# Patient Record
Sex: MALE | Race: WHITE | NOT HISPANIC OR LATINO | URBAN - METROPOLITAN AREA
[De-identification: names, ages, dates, MRNs, and addresses within clinical notes are randomized per-mention and may not be internally consistent; named-entity substitution may affect disease eponyms.]

---

## 2017-05-31 ENCOUNTER — OUTPATIENT (OUTPATIENT)
Dept: OUTPATIENT SERVICES | Facility: HOSPITAL | Age: 57
LOS: 1 days | Discharge: HOME | End: 2017-05-31
Payer: MEDICARE

## 2017-05-31 ENCOUNTER — TRANSCRIPTION ENCOUNTER (OUTPATIENT)
Age: 57
End: 2017-05-31

## 2017-05-31 PROCEDURE — 66984 XCAPSL CTRC RMVL W/O ECP: CPT | Mod: RT

## 2017-05-31 PROCEDURE — C1780: CPT

## 2020-07-01 PROBLEM — E11.9 TYPE 2 DIABETES MELLITUS WITHOUT COMPLICATIONS: Chronic | Status: ACTIVE | Noted: 2020-03-10

## 2020-07-07 ENCOUNTER — TRANSCRIPTION ENCOUNTER (OUTPATIENT)
Age: 60
End: 2020-07-07

## 2020-07-07 VITALS
OXYGEN SATURATION: 99 % | DIASTOLIC BLOOD PRESSURE: 83 MMHG | TEMPERATURE: 98 F | WEIGHT: 235.01 LBS | HEART RATE: 72 BPM | HEIGHT: 72 IN | RESPIRATION RATE: 16 BRPM | SYSTOLIC BLOOD PRESSURE: 159 MMHG

## 2020-07-07 RX ORDER — ACETAMINOPHEN 500 MG
650 TABLET ORAL EVERY 6 HOURS
Refills: 0 | Status: DISCONTINUED | OUTPATIENT
Start: 2020-07-08 | End: 2020-07-23

## 2020-07-07 NOTE — ASU PATIENT PROFILE, ADULT - MEDICATION DISPOSITION, PROFILE
pt has eyedrops in bedside drawer because  he has to bring them to Dr Puentes's office post op/bedside

## 2020-07-07 NOTE — ASU PATIENT PROFILE, ADULT - PMH
Carotid artery occlusion  20% right and left  Cataract of left eye    Diabetes type 2, controlled    Low vitamin D level    Osteomyelitis  chronic left stump

## 2020-07-08 ENCOUNTER — OUTPATIENT (OUTPATIENT)
Dept: OUTPATIENT SERVICES | Facility: HOSPITAL | Age: 60
LOS: 1 days | End: 2020-07-08
Payer: MEDICARE

## 2020-07-08 VITALS
TEMPERATURE: 98 F | DIASTOLIC BLOOD PRESSURE: 62 MMHG | HEART RATE: 51 BPM | OXYGEN SATURATION: 98 % | RESPIRATION RATE: 16 BRPM | SYSTOLIC BLOOD PRESSURE: 145 MMHG

## 2020-07-08 DIAGNOSIS — Z89.512 ACQUIRED ABSENCE OF LEFT LEG BELOW KNEE: Chronic | ICD-10-CM

## 2020-07-08 DIAGNOSIS — Z98.41 CATARACT EXTRACTION STATUS, RIGHT EYE: Chronic | ICD-10-CM

## 2020-07-08 DIAGNOSIS — H25.12 AGE-RELATED NUCLEAR CATARACT, LEFT EYE: ICD-10-CM

## 2020-07-08 LAB — GLUCOSE BLDC GLUCOMTR-MCNC: 264 MG/DL — HIGH (ref 70–99)

## 2020-07-08 PROCEDURE — V2632: CPT

## 2020-07-08 PROCEDURE — 82962 GLUCOSE BLOOD TEST: CPT

## 2020-07-08 PROCEDURE — 66984 XCAPSL CTRC RMVL W/O ECP: CPT | Mod: LT

## 2020-07-08 RX ORDER — SODIUM CHLORIDE 9 MG/ML
1000 INJECTION, SOLUTION INTRAVENOUS
Refills: 0 | Status: DISCONTINUED | OUTPATIENT
Start: 2020-07-08 | End: 2020-07-08

## 2020-07-08 RX ORDER — KETOROLAC TROMETHAMINE 0.5 %
1 DROPS OPHTHALMIC (EYE)
Refills: 0 | Status: COMPLETED | OUTPATIENT
Start: 2020-07-08 | End: 2020-07-08

## 2020-07-08 RX ORDER — CYCLOPENTOLATE HYDROCHLORIDE 10 MG/ML
1 SOLUTION/ DROPS OPHTHALMIC
Refills: 0 | Status: COMPLETED | OUTPATIENT
Start: 2020-07-08 | End: 2020-07-08

## 2020-07-08 RX ORDER — TROPICAMIDE 1 %
1 DROPS OPHTHALMIC (EYE)
Refills: 0 | Status: COMPLETED | OUTPATIENT
Start: 2020-07-08 | End: 2020-07-08

## 2020-07-08 RX ORDER — PHENYLEPHRINE HCL 2.5 %
1 DROPS OPHTHALMIC (EYE)
Refills: 0 | Status: COMPLETED | OUTPATIENT
Start: 2020-07-08 | End: 2020-07-08

## 2020-07-08 RX ADMIN — CYCLOPENTOLATE HYDROCHLORIDE 1 DROP(S): 10 SOLUTION/ DROPS OPHTHALMIC at 07:48

## 2020-07-08 RX ADMIN — Medication 1 DROP(S): at 07:59

## 2020-07-08 RX ADMIN — Medication 1 DROP(S): at 07:53

## 2020-07-08 RX ADMIN — CYCLOPENTOLATE HYDROCHLORIDE 1 DROP(S): 10 SOLUTION/ DROPS OPHTHALMIC at 07:53

## 2020-07-08 RX ADMIN — CYCLOPENTOLATE HYDROCHLORIDE 1 DROP(S): 10 SOLUTION/ DROPS OPHTHALMIC at 07:59

## 2020-07-08 RX ADMIN — Medication 1 DROP(S): at 07:48

## 2020-07-08 RX ADMIN — Medication 1 DROP(S): at 07:52

## 2020-07-08 RX ADMIN — SODIUM CHLORIDE 40 MILLILITER(S): 9 INJECTION, SOLUTION INTRAVENOUS at 08:02

## 2020-07-08 RX ADMIN — Medication 1 DROP(S): at 07:58

## 2020-07-08 NOTE — BRIEF OPERATIVE NOTE - NSICDXBRIEFPROCEDURE_GEN_ALL_CORE_FT
PROCEDURES:  Single stage extracapsular removal of cataract with insertion of intraocular lens prosthesis by phacoemulsification 08-Jul-2020 09:38:02  Edison Puentes

## 2020-07-08 NOTE — ASU DISCHARGE PLAN (ADULT/PEDIATRIC) - NURSING INSTRUCTIONS
All discharge instruction reviewed with patient including pain, safety, medication and follow up care. Pt acknowleged understanding of discharge instructions

## 2020-07-08 NOTE — ASU DISCHARGE PLAN (ADULT/PEDIATRIC) - ASU DC SPECIAL INSTRUCTIONSFT
Keep shield on eye until office visit today at 2 pm  Any problems call office at [phone]    OFFICE VISIT TODAY AT 2:30 pm  Bring Drops

## 2022-03-25 NOTE — ASU PATIENT PROFILE, ADULT - AS SC BRADEN MOISTURE
1st Attempt: Called and left message for patient to call clinic back for results   (3) occasionally moist

## 2023-03-02 ENCOUNTER — EMERGENCY (EMERGENCY)
Facility: HOSPITAL | Age: 63
LOS: 1 days | Discharge: HOME | End: 2023-03-02
Attending: EMERGENCY MEDICINE | Admitting: EMERGENCY MEDICINE
Payer: MEDICARE

## 2023-03-02 VITALS
OXYGEN SATURATION: 97 % | DIASTOLIC BLOOD PRESSURE: 76 MMHG | SYSTOLIC BLOOD PRESSURE: 158 MMHG | TEMPERATURE: 100 F | RESPIRATION RATE: 17 BRPM | HEART RATE: 81 BPM

## 2023-03-02 VITALS
OXYGEN SATURATION: 95 % | RESPIRATION RATE: 18 BRPM | HEART RATE: 117 BPM | WEIGHT: 235.01 LBS | TEMPERATURE: 99 F | SYSTOLIC BLOOD PRESSURE: 170 MMHG | DIASTOLIC BLOOD PRESSURE: 91 MMHG

## 2023-03-02 DIAGNOSIS — Z98.41 CATARACT EXTRACTION STATUS, RIGHT EYE: Chronic | ICD-10-CM

## 2023-03-02 DIAGNOSIS — Z89.512 ACQUIRED ABSENCE OF LEFT LEG BELOW KNEE: Chronic | ICD-10-CM

## 2023-03-02 PROBLEM — R79.89 OTHER SPECIFIED ABNORMAL FINDINGS OF BLOOD CHEMISTRY: Chronic | Status: ACTIVE | Noted: 2020-07-07

## 2023-03-02 PROBLEM — I65.29 OCCLUSION AND STENOSIS OF UNSPECIFIED CAROTID ARTERY: Chronic | Status: ACTIVE | Noted: 2020-07-07

## 2023-03-02 PROBLEM — H26.9 UNSPECIFIED CATARACT: Chronic | Status: ACTIVE | Noted: 2020-07-07

## 2023-03-02 PROBLEM — M86.9 OSTEOMYELITIS, UNSPECIFIED: Chronic | Status: ACTIVE | Noted: 2020-07-07

## 2023-03-02 LAB
ALBUMIN SERPL ELPH-MCNC: 3.2 G/DL — LOW (ref 3.3–5)
ALP SERPL-CCNC: 107 U/L — SIGNIFICANT CHANGE UP (ref 40–120)
ALT FLD-CCNC: 14 U/L — SIGNIFICANT CHANGE UP (ref 10–45)
ANION GAP SERPL CALC-SCNC: 11 MMOL/L — SIGNIFICANT CHANGE UP (ref 5–17)
APPEARANCE UR: CLEAR — SIGNIFICANT CHANGE UP
APTT BLD: 27.4 SEC — LOW (ref 27.5–35.5)
AST SERPL-CCNC: 19 U/L — SIGNIFICANT CHANGE UP (ref 10–40)
BACTERIA # UR AUTO: ABNORMAL /HPF
BASOPHILS # BLD AUTO: 0.03 K/UL — SIGNIFICANT CHANGE UP (ref 0–0.2)
BASOPHILS NFR BLD AUTO: 0.2 % — SIGNIFICANT CHANGE UP (ref 0–2)
BILIRUB SERPL-MCNC: 1 MG/DL — SIGNIFICANT CHANGE UP (ref 0.2–1.2)
BILIRUB UR-MCNC: NEGATIVE — SIGNIFICANT CHANGE UP
BLD GP AB SCN SERPL QL: SIGNIFICANT CHANGE UP
BUN SERPL-MCNC: 20 MG/DL — SIGNIFICANT CHANGE UP (ref 7–23)
CALCIUM SERPL-MCNC: 9.1 MG/DL — SIGNIFICANT CHANGE UP (ref 8.4–10.5)
CHLORIDE SERPL-SCNC: 90 MMOL/L — LOW (ref 96–108)
CO2 SERPL-SCNC: 29 MMOL/L — SIGNIFICANT CHANGE UP (ref 22–31)
COLOR SPEC: YELLOW — SIGNIFICANT CHANGE UP
CREAT SERPL-MCNC: 1.04 MG/DL — SIGNIFICANT CHANGE UP (ref 0.5–1.3)
DIFF PNL FLD: ABNORMAL
EGFR: 81 ML/MIN/1.73M2 — SIGNIFICANT CHANGE UP
EOSINOPHIL # BLD AUTO: 0.02 K/UL — SIGNIFICANT CHANGE UP (ref 0–0.5)
EOSINOPHIL NFR BLD AUTO: 0.1 % — SIGNIFICANT CHANGE UP (ref 0–6)
EPI CELLS # UR: SIGNIFICANT CHANGE UP
GLUCOSE BLDC GLUCOMTR-MCNC: 334 MG/DL — HIGH (ref 70–99)
GLUCOSE SERPL-MCNC: 408 MG/DL — HIGH (ref 70–99)
GLUCOSE UR QL: 1000 MG/DL
HCT VFR BLD CALC: 50.8 % — HIGH (ref 39–50)
HGB BLD-MCNC: 17.7 G/DL — HIGH (ref 13–17)
IMM GRANULOCYTES NFR BLD AUTO: 0.5 % — SIGNIFICANT CHANGE UP (ref 0–0.9)
INR BLD: 1.16 RATIO — SIGNIFICANT CHANGE UP (ref 0.88–1.16)
KETONES UR-MCNC: ABNORMAL
LEUKOCYTE ESTERASE UR-ACNC: ABNORMAL
LIDOCAIN IGE QN: 56 U/L — LOW (ref 73–393)
LYMPHOCYTES # BLD AUTO: 14.3 % — SIGNIFICANT CHANGE UP (ref 13–44)
LYMPHOCYTES # BLD AUTO: 2.12 K/UL — SIGNIFICANT CHANGE UP (ref 1–3.3)
MCHC RBC-ENTMCNC: 30.2 PG — SIGNIFICANT CHANGE UP (ref 27–34)
MCHC RBC-ENTMCNC: 34.8 GM/DL — SIGNIFICANT CHANGE UP (ref 32–36)
MCV RBC AUTO: 86.7 FL — SIGNIFICANT CHANGE UP (ref 80–100)
MONOCYTES # BLD AUTO: 1.42 K/UL — HIGH (ref 0–0.9)
MONOCYTES NFR BLD AUTO: 9.6 % — SIGNIFICANT CHANGE UP (ref 2–14)
NEUTROPHILS # BLD AUTO: 11.16 K/UL — HIGH (ref 1.8–7.4)
NEUTROPHILS NFR BLD AUTO: 75.3 % — SIGNIFICANT CHANGE UP (ref 43–77)
NITRITE UR-MCNC: NEGATIVE — SIGNIFICANT CHANGE UP
NRBC # BLD: 0 /100 WBCS — SIGNIFICANT CHANGE UP (ref 0–0)
OB PNL STL: POSITIVE
PH UR: 6 — SIGNIFICANT CHANGE UP (ref 5–8)
PLATELET # BLD AUTO: 195 K/UL — SIGNIFICANT CHANGE UP (ref 150–400)
POTASSIUM SERPL-MCNC: 3.5 MMOL/L — SIGNIFICANT CHANGE UP (ref 3.5–5.3)
POTASSIUM SERPL-SCNC: 3.5 MMOL/L — SIGNIFICANT CHANGE UP (ref 3.5–5.3)
PROT SERPL-MCNC: 7.2 G/DL — SIGNIFICANT CHANGE UP (ref 6–8.3)
PROT UR-MCNC: 30 MG/DL
PROTHROM AB SERPL-ACNC: 13.5 SEC — HIGH (ref 10.5–13.4)
RAPID RVP RESULT: SIGNIFICANT CHANGE UP
RBC # BLD: 5.86 M/UL — HIGH (ref 4.2–5.8)
RBC # FLD: 12.7 % — SIGNIFICANT CHANGE UP (ref 10.3–14.5)
RBC CASTS # UR COMP ASSIST: SIGNIFICANT CHANGE UP /HPF (ref 0–4)
SARS-COV-2 RNA SPEC QL NAA+PROBE: SIGNIFICANT CHANGE UP
SODIUM SERPL-SCNC: 130 MMOL/L — LOW (ref 135–145)
SP GR SPEC: 1.01 — SIGNIFICANT CHANGE UP (ref 1.01–1.02)
UROBILINOGEN FLD QL: NEGATIVE — SIGNIFICANT CHANGE UP
WBC # BLD: 14.82 K/UL — HIGH (ref 3.8–10.5)
WBC # FLD AUTO: 14.82 K/UL — HIGH (ref 3.8–10.5)
WBC UR QL: SIGNIFICANT CHANGE UP /HPF (ref 0–5)

## 2023-03-02 PROCEDURE — 36415 COLL VENOUS BLD VENIPUNCTURE: CPT

## 2023-03-02 PROCEDURE — 82272 OCCULT BLD FECES 1-3 TESTS: CPT

## 2023-03-02 PROCEDURE — 83690 ASSAY OF LIPASE: CPT

## 2023-03-02 PROCEDURE — 86900 BLOOD TYPING SEROLOGIC ABO: CPT

## 2023-03-02 PROCEDURE — 99284 EMERGENCY DEPT VISIT MOD MDM: CPT | Mod: 25

## 2023-03-02 PROCEDURE — 99284 EMERGENCY DEPT VISIT MOD MDM: CPT | Mod: FS

## 2023-03-02 PROCEDURE — 81001 URINALYSIS AUTO W/SCOPE: CPT

## 2023-03-02 PROCEDURE — 87086 URINE CULTURE/COLONY COUNT: CPT

## 2023-03-02 PROCEDURE — 85025 COMPLETE CBC W/AUTO DIFF WBC: CPT

## 2023-03-02 PROCEDURE — 71045 X-RAY EXAM CHEST 1 VIEW: CPT

## 2023-03-02 PROCEDURE — 96361 HYDRATE IV INFUSION ADD-ON: CPT

## 2023-03-02 PROCEDURE — 96374 THER/PROPH/DIAG INJ IV PUSH: CPT

## 2023-03-02 PROCEDURE — 86850 RBC ANTIBODY SCREEN: CPT

## 2023-03-02 PROCEDURE — 71045 X-RAY EXAM CHEST 1 VIEW: CPT | Mod: 26

## 2023-03-02 PROCEDURE — 82962 GLUCOSE BLOOD TEST: CPT

## 2023-03-02 PROCEDURE — 80053 COMPREHEN METABOLIC PANEL: CPT

## 2023-03-02 PROCEDURE — 85730 THROMBOPLASTIN TIME PARTIAL: CPT

## 2023-03-02 PROCEDURE — 86901 BLOOD TYPING SEROLOGIC RH(D): CPT

## 2023-03-02 PROCEDURE — 85610 PROTHROMBIN TIME: CPT

## 2023-03-02 PROCEDURE — 0225U NFCT DS DNA&RNA 21 SARSCOV2: CPT

## 2023-03-02 RX ORDER — PANTOPRAZOLE SODIUM 20 MG/1
40 TABLET, DELAYED RELEASE ORAL ONCE
Refills: 0 | Status: COMPLETED | OUTPATIENT
Start: 2023-03-02 | End: 2023-03-02

## 2023-03-02 RX ORDER — ACETAMINOPHEN 500 MG
975 TABLET ORAL ONCE
Refills: 0 | Status: COMPLETED | OUTPATIENT
Start: 2023-03-02 | End: 2023-03-02

## 2023-03-02 RX ORDER — LIDOCAINE 4 G/100G
1 CREAM TOPICAL ONCE
Refills: 0 | Status: COMPLETED | OUTPATIENT
Start: 2023-03-02 | End: 2023-03-02

## 2023-03-02 RX ORDER — PANTOPRAZOLE SODIUM 20 MG/1
1 TABLET, DELAYED RELEASE ORAL
Qty: 28 | Refills: 0
Start: 2023-03-02 | End: 2023-03-15

## 2023-03-02 RX ORDER — SODIUM CHLORIDE 9 MG/ML
1000 INJECTION INTRAMUSCULAR; INTRAVENOUS; SUBCUTANEOUS ONCE
Refills: 0 | Status: COMPLETED | OUTPATIENT
Start: 2023-03-02 | End: 2023-03-02

## 2023-03-02 RX ADMIN — Medication 975 MILLIGRAM(S): at 16:06

## 2023-03-02 RX ADMIN — PANTOPRAZOLE SODIUM 40 MILLIGRAM(S): 20 TABLET, DELAYED RELEASE ORAL at 16:38

## 2023-03-02 RX ADMIN — SODIUM CHLORIDE 1000 MILLILITER(S): 9 INJECTION INTRAMUSCULAR; INTRAVENOUS; SUBCUTANEOUS at 15:56

## 2023-03-02 RX ADMIN — LIDOCAINE 1 PATCH: 4 CREAM TOPICAL at 16:05

## 2023-03-02 NOTE — ED PROVIDER NOTE - CARE PROVIDER_API CALL
Salvatore Bunn (DO)  Gastroenterology  [address], [address]  [place] [address]  Phone: [phone]  Fax: [phone]  Follow Up Time:     Kory Tabor)  Orthopedics  [address], [address]  [place], [place] [id number]  Phone: [phone]  Fax: [phone]  Follow Up Time:     Rocky Waddell)  Medicine  [address], [address]  [place] [address]  Phone: [phone]  Fax: [phone]  Follow Up Time:

## 2023-03-02 NOTE — ED PROVIDER NOTE - ATTENDING SHARED VISIT SELECTORS
Glycopyrrolate Counseling:  I discussed with the patient the risks of glycopyrrolate including but not limited to skin rash, drowsiness, dry mouth, difficulty urinating, and blurred vision. History/Exam/Medical Decision Making
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
## 2023-03-02 NOTE — ED PROVIDER NOTE - CARE PROVIDERS DIRECT ADDRESSES
,DirectAddress_Unknown,DirectAddress_Unknown,[email or web address]

## 2023-03-02 NOTE — ED PROVIDER NOTE - PROGRESS NOTE DETAILS
WBC 14.82  ANC 11.16  Na 130  Cl 90  Glu at 1555PM 408  Rpt Glu FS at 1635PM 334    Estevan Richardson PA-C UA does not require any emergent intervention - will await Urine Cx.  Will repeat VS - then if acceptable, Patient is stable, in no apparent distress, non-toxic appearing, tolerating PO, no neurologic deficits, and is cleared for discharge to home. Estevan Richardson PA-C Reviewed findings with patient's nice in law, Faina Sally NP - [phone]; Sorin (patient) asked that I review with his niece. Estevan Richardson PA-C

## 2023-03-02 NOTE — ED PROVIDER NOTE - ATTENDING APP SHARED VISIT CONTRIBUTION OF CARE
Mary with MILAGRO Alfaro. 62M who presents to ER for CC of "Vomiting Blood" 2 days ago, none since- also in the setting of non-bloody diarrhea, anorexia, nausea, and mild abd pain (epigastric) region, dysuria (resolved). + sick contact - grandchild w/ similar symptoms. Has sciatic pain as well. Here, VS w/ tachycardia and rectal temperature is 100F. Patient w/ no findings of sepsis, however. PE above and + Straight Leg Test, R Side, otherwise exam unremarkable. Nontender abd. Labs reviewed. H/H nml. Glucose elevated. Pt has pCP but asking for a new. Will coordinate with Natacha. Worsening, continued or ANY new concerning symptoms return to the emergency department.     I performed a face to face bedside interview with patient regarding history of present illness, review of symptoms and past medical history. I completed an independent physical exam.  I have discussed the patient's plan of care with Physician Assistant (PA). I agree with note as stated above, having amended the EMR as needed to reflect my findings.   This includes History of Present Illness, HIV, Past Medical/Surgical/Family/Social History, Allergies and Home Medications, Review of Systems, Physical Exam, and any Progress Notes during the time I functioned as the attending physician for this patient.

## 2023-03-02 NOTE — ED ADULT NURSE NOTE - NSICDXPASTMEDICALHX_GEN_ALL_CORE_FT
PAST MEDICAL HISTORY:  Carotid artery occlusion 20% right and left    Cataract of left eye     Diabetes type 2, controlled     Low vitamin D level     Osteomyelitis chronic left stump

## 2023-03-02 NOTE — ED PROVIDER NOTE - PATIENT PORTAL LINK FT
You can access the FollowMyHealth Patient Portal offered by [facility] by registering at the following website: [email or web address]. By joining [employer]’s FollowMyHealth portal, you will also be able to view your health information using other applications (apps) compatible with our system.

## 2023-03-02 NOTE — ED PROVIDER NOTE - PROVIDER TOKENS
PROVIDER:[TOKEN:[[id number]]],PROVIDER:[TOKEN:[[id number]]],PROVIDER:[TOKEN:[[id number]]]

## 2023-03-02 NOTE — ED PROVIDER NOTE - MUSCULOSKELETAL, MLM
+ reproducible pain of the RLE radiating from R Knee to the R Lower Back w/ straight leg test. Spine appears normal, range of motion is not limited, no muscle or joint tenderness

## 2023-03-02 NOTE — ED PROVIDER NOTE - NSFOLLOWUPINSTRUCTIONS_ED_ALL_ED_FT
BEBE was seen in the ER after having an episode of bloody vomiting.    Bloodwork was performed - findings suggested a degree of dehydration and you received IV Fluids.    A Chest X Ray was performed and normal.    We will contact you with the results from the Viral Swab we performed.    Start Pantoprazole 40mg Oral Tablet, 1 Tablet, Once every 12 Hours, taken 30 minutes prior to a meal.    For Sciatic Pain, you may use Tylenol - refer to packaging for appropriate dose and frequency. You may also use over the counter lidocaine patches - refer to packaging for appropriate dose and frequency.    Remain hydrated.    Follow up with a Primary Care Physician and Gastroenterologist - we reviewed with Social Work to help facilitate follow up. They will call you.    Review instructions below:                        Gastrointestinal Bleeding      Gastrointestinal (GI) bleeding is bleeding somewhere along the digestive tract, between the mouth and the anus. The digestive tract includes the mouth, esophagus, stomach, small intestine, large intestine, and anus. The large intestine is often called the colon.    GI bleeding can be caused by various problems. The severity of these problems can be mild, serious, or life-threatening. If you have GI bleeding, you may find blood in your stools (feces), you may have black stools, or you may vomit blood. You may need to stay in the hospital if there is a lot of bleeding.      What are the causes?    This condition may be caused by:  •Inflammation, irritation, or swelling of the esophagus (esophagitis). The esophagus is part of the body that moves food from your mouth to your stomach.      •Swollen veins in the rectum (hemorrhoids).      •Tears in the anus (anal fissures). The tears are often caused by passing hard stool.      •Pouches that form on the colon and may bleed (diverticulosis).      •Inflammation in areas with diverticulosis. This is called diverticulitis.This can cause pain, fever, and bloody stools.      •Growths (polyps) or cancer. Colon cancer often starts out as precancerous polyps.      •Gastritis and ulcers. These may cause bleeding in the upper GI tract, near the stomach.        What increases the risk?    You are more likely to develop this condition if:  •You have an infection in your stomach from a type of bacteria called Helicobacter pylori.    •You take certain medicines, such as:  •NSAIDs.      •Aspirin.      •Selective serotonin reuptake inhibitors (SSRIs).      •Steroids.      •Antiplatelet or anticoagulant medicines.        •You smoke.      •You drink alcohol.        What are the signs or symptoms?    Common symptoms of this condition include:  •Bright red blood in your vomit, or vomit that looks like coffee grounds.    •Bloody, black, or tarry stools.  •Bleeding from the lower GI tract will usually cause red or maroon blood in the stools.      •Bleeding from the upper GI tract may cause black, tarry stools that are often stronger smelling than usual.      •In certain cases, if the bleeding is fast enough, the stools may be red.        •Pain or cramping in the abdomen.        How is this diagnosed?    This condition may be diagnosed based on:  •Your medical history and a physical exam.    •Various tests, such as:  •Blood tests.      •Stool tests.      •X-rays and other imaging tests.      •Esophagogastroduodenoscopy (EGD). In this test, a flexible, lighted tube is used to look at your esophagus, stomach, and small intestine.      •Colonoscopy. In this test, a flexible, lighted tube is used to look at your colon.          How is this treated?    Treatment for this condition depends on the cause of the bleeding. For example:  •For bleeding from the esophagus, stomach, small intestine, or colon, the health care provider may do a procedure to stop bleeding during your EGD or colonoscopy.      •Inflammation or infection of the colon can be treated with medicines.      •Certain rectal problems can be treated with creams, suppositories, or warm baths.      •Medicines may be given to reduce acid in your stomach.      •Surgery is sometimes done.      •Blood transfusions are sometimes needed if a lot of blood has been lost.      If there is a lot of bleeding, you will need to stay in the hospital for observation. If bleeding is mild, you may be allowed to go home.      Follow these instructions at home:  Foods with fiber, including fruits, vegetables, greens, seeds, and grain.   •Take over-the-counter and prescription medicines only as told by your health care provider.      •Eat foods that are high in fiber, such as beans, whole grains, and fresh fruits and vegetables. This will help to keep your stools soft. Eating 1–3 prunes each day works well for many people.      •Drink enough fluid to keep your urine pale yellow.      •Keep all follow-up visits. This is important.        Contact a health care provider if:    •Your symptoms do not improve with treatment.        Get help right away if:    •Your bleeding does not stop.      •You feel light-headed or you faint.      •You feel weak.      •You have severe cramps in your back or abdomen.      •You pass large blood clots in your stool.      •Your symptoms are getting worse.      •You have chest pain or fast heartbeats.      These symptoms may be an emergency. Get help right away. Call 911.   • Do not wait to see if the symptoms will go away.        •  Do not drive yourself to the hospital.         Summary    •Gastrointestinal (GI) bleeding is bleeding somewhere along the digestive tract, between the mouth and anus. GI bleeding can be caused by various problems.      •Treatment for this condition depends on the cause of the bleeding.      •Take over-the-counter and prescription medicines only as told by your health care provider.      •Get help right away if your bleeding increases, your symptoms are getting worse, or you have new symptoms.      •Keep all follow-up visits. This is important.      This information is not intended to replace advice given to you by your health care provider. Make sure you discuss any questions you have with your health care provider.                        Sciatica    WHAT YOU NEED TO KNOW:    Sciatica is a condition that causes pain along your sciatic nerve. The sciatic nerve runs from your spine through both sides of your buttocks. It then runs down the back of your thigh, into your lower leg and foot. Any place along your sciatic nerve may be compressed, inflamed, irritated, or stretched.  Sciatica         DISCHARGE INSTRUCTIONS:    Return to the emergency department if:   •You have trouble controlling your urine or bowel movements.      •You have weakness in both legs.      •You have numbness in your groin or buttocks.      Call your doctor if:   •You have pain in your lower back at night or when resting.      •You have pain in your lower back with numbness below the knee.      •You have weakness in one leg only.      •You have questions or concerns about your condition or care.      Medicines: You may need any of the following:  •NSAIDs, such as ibuprofen, help decrease swelling, pain, and fever. This medicine is available with or without a doctor's order. NSAIDs can cause stomach bleeding or kidney problems in certain people. If you take blood thinner medicine, always ask your healthcare provider if NSAIDs are safe for you. Always read the medicine label and follow directions.      •Acetaminophen decreases pain and fever. It is available without a doctor's order. Ask how much to take and how often to take it. Follow directions. Read the labels of all other medicines you are using to see if they also contain acetaminophen, or ask your doctor or pharmacist. Acetaminophen can cause liver damage if not taken correctly.      •Muscle relaxers help decrease pain and muscle spasms.      •Take your medicine as directed. Contact your healthcare provider if you think your medicine is not helping or if you have side effects. Tell your provider if you are allergic to any medicine. Keep a list of the medicines, vitamins, and herbs you take. Include the amounts, and when and why you take them. Bring the list or the pill bottles to follow-up visits. Carry your medicine list with you in case of an emergency.      Manage your symptoms:   •Decrease your activity as directed. Do not lift heavy objects or twist your back for at least 6 weeks. Slowly return to your usual activity.      •Apply ice to help decrease swelling and pain. Use an ice pack, or put crushed ice in a plastic bag. Cover it with a towel before you place it on your back or leg. Apply ice for 15 to 20 minutes every hour, or as directed.      •Apply heat to help decrease pain and muscle spasms. Use a heat pack or a heating pad set on low heat. Apply heat on your back or leg for 20 to 30 minutes every 2 hours for as many days as directed.      •Go to physical or occupational therapy as directed. A physical therapist teaches you exercises to help improve movement and strength, and to decrease pain. An occupational therapist teaches you skills to help with your daily activities.      •Use assistive devices, if directed. You may need to wear back support, such as a back brace. You may need crutches, a cane, or a walker to decrease stress on your lower back and leg muscles. Ask your healthcare provider for more information about assistive devices and how to use them correctly.      Prevent sciatica:   •Avoid pressure on your back and legs. Do not lift heavy objects, or stand or sit for long periods of time.      •Lift objects safely. Keep your back straight and bend your knees when you  an object. Do not bend or twist your back when you lift.  How to Lift Items Safely           •Maintain a healthy weight. Ask your healthcare provider what a healthy weight is for you. Your provider can help you create a weight loss plan, if needed.      •Exercise as directed. Ask your healthcare provider about the best stretching, warmup, and exercise plan for you.      Follow up with your doctor as directed: Write down your questions so you remember to ask them during your visits.

## 2023-03-02 NOTE — ED PROVIDER NOTE - CLINICAL SUMMARY MEDICAL DECISION MAKING FREE TEXT BOX
BEBE SALOMON is a 62 YEAR OLD MALE who presents to ER for CC of "Vomiting Blood" - also in the setting of non-bloody diarrhea, anorexia, nausea, and mild abd pain (epigastric) region, dysuria (resolved). + sick contact - grandchild w/ similar symptoms. Has sciatic pain as well. Here, VS w/ tachycardia and rectal temperature is 100F. Patient w/ no findings of sepsis, however. PE above and + Straight Leg Test, R Side, otherwise exam unremarkable. Estevan Richardson PA-C
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
## 2023-03-02 NOTE — ED ADULT NURSE NOTE - OBJECTIVE STATEMENT
Pt presents to ED with c/o epigastric discomfort. Pt vomiting starting last night.  Pt reports multiple episodes of bloody emesis last night.  Denies a Pt presents to ED with c/o epigastric discomfort.  Pt reports multiple episodes of bloody emesis last night.  Denies any melena, diarrhea, fevers, chills, or urinary complaints.  Abdomen is soft, nontender to touch.  Daily smoker.  Noted to have left BKA.

## 2023-03-02 NOTE — ED PROVIDER NOTE - NSICDXPASTSURGICALHX_GEN_ALL_CORE_FT
PAST SURGICAL HISTORY:  Cataract extraction status of eye, right     History of left below knee amputation

## 2023-03-05 LAB
CULTURE RESULTS: SIGNIFICANT CHANGE UP
SPECIMEN SOURCE: SIGNIFICANT CHANGE UP

## 2023-04-24 ENCOUNTER — NON-APPOINTMENT (OUTPATIENT)
Age: 63
End: 2023-04-24

## 2023-04-24 ENCOUNTER — APPOINTMENT (OUTPATIENT)
Dept: FAMILY MEDICINE | Facility: CLINIC | Age: 63
End: 2023-04-24
Payer: MEDICARE

## 2023-04-24 VITALS — RESPIRATION RATE: 20 BRPM | HEART RATE: 78 BPM | DIASTOLIC BLOOD PRESSURE: 60 MMHG | SYSTOLIC BLOOD PRESSURE: 130 MMHG

## 2023-04-24 DIAGNOSIS — K92.2 GASTROINTESTINAL HEMORRHAGE, UNSPECIFIED: ICD-10-CM

## 2023-04-24 DIAGNOSIS — Z00.00 ENCOUNTER FOR GENERAL ADULT MEDICAL EXAMINATION W/OUT ABNORMAL FINDINGS: ICD-10-CM

## 2023-04-24 DIAGNOSIS — S88.919A COMPLETE TRAUMATIC AMPUTATION OF UNSPECIFIED LOWER LEG, LVL UNSPECIFIED, INITIAL ENCOUNTER: ICD-10-CM

## 2023-04-24 PROCEDURE — 99205 OFFICE O/P NEW HI 60 MIN: CPT | Mod: 25

## 2023-04-24 PROCEDURE — 36415 COLL VENOUS BLD VENIPUNCTURE: CPT

## 2023-04-24 RX ORDER — METHYLPREDNISOLONE 4 MG/1
4 TABLET ORAL
Qty: 21 | Refills: 0 | Status: DISCONTINUED | COMMUNITY
Start: 2023-02-18

## 2023-04-24 NOTE — REVIEW OF SYSTEMS
[Joint Pain] : joint pain [Back Pain] : back pain [Negative] : Psychiatric [FreeTextEntry7] : denies pain at present [de-identified] : using crutches or wheelchair
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
## 2023-04-24 NOTE — ASSESSMENT
[FreeTextEntry1] : Hemodynamically stable with acceptable BP\par Abdominal exam unremarkable at this encounter\par Findings otherwise consistent with history\par Lab profiles drawn in office and sent

## 2023-04-24 NOTE — COUNSELING
[Use of nicotine replacement therapies and other medications discussed] : Use of nicotine replacement therapies and other medications discussed [Yes] : Willing to quit smoking [None] : None [de-identified] : Healthy eating [Good understanding] : Patient has a good understanding of lifestyle changes and steps needed to achieve self management goal

## 2023-04-24 NOTE — HEALTH RISK ASSESSMENT
[No] : In the past 12 months have you used drugs other than those required for medical reasons? No [0] : 2) Feeling down, depressed, or hopeless: Not at all (0) [Patient declined Low Dose CT Scan] : Patient declined Low Dose CT Scan [With Patient/Caregiver] : , with patient/caregiver [Current] : Current [20 or more] : 20 or more [Audit-CScore] : 0 [KSE8Kosag] : 0 [AdvancecareDate] : 04/23 [FreeTextEntry4] : to discuss with family

## 2023-04-24 NOTE — PHYSICAL EXAM
[No Acute Distress] : no acute distress [No Carotid Bruits] : no carotid bruits [No Abdominal Bruit] : a ~M bruit was not heard ~T in the abdomen [No Edema] : there was no peripheral edema [No Palpable Aorta] : no palpable aorta [Normal] : soft, non-tender, non-distended, no masses palpated, no HSM and normal bowel sounds [Normal Posterior Cervical Nodes] : no posterior cervical lymphadenopathy [Normal Anterior Cervical Nodes] : no anterior cervical lymphadenopathy [No CVA Tenderness] : no CVA  tenderness [No Focal Deficits] : no focal deficits [Speech Grossly Normal] : speech grossly normal [Memory Grossly Normal] : memory grossly normal [Normal Affect] : the affect was normal [Alert and Oriented x3] : oriented to person, place, and time [Normal Mood] : the mood was normal [Normal Insight/Judgement] : insight and judgment were intact [de-identified] : R AKA noted; chronic appearing changes stump LLE [de-identified] : as above

## 2023-04-25 ENCOUNTER — APPOINTMENT (OUTPATIENT)
Dept: GASTROENTEROLOGY | Facility: CLINIC | Age: 63
End: 2023-04-25
Payer: MEDICARE

## 2023-04-25 VITALS
HEART RATE: 94 BPM | RESPIRATION RATE: 16 BRPM | HEIGHT: 72 IN | BODY MASS INDEX: 31.83 KG/M2 | OXYGEN SATURATION: 96 % | WEIGHT: 235 LBS | TEMPERATURE: 98 F | DIASTOLIC BLOOD PRESSURE: 85 MMHG | SYSTOLIC BLOOD PRESSURE: 135 MMHG

## 2023-04-25 LAB
ALBUMIN SERPL ELPH-MCNC: 4.3 G/DL
ALP BLD-CCNC: 101 U/L
ALT SERPL-CCNC: 13 U/L
ANION GAP SERPL CALC-SCNC: 16 MMOL/L
AST SERPL-CCNC: 11 U/L
BASOPHILS # BLD AUTO: 0.08 K/UL
BASOPHILS NFR BLD AUTO: 0.6 %
BILIRUB SERPL-MCNC: 0.5 MG/DL
BUN SERPL-MCNC: 14 MG/DL
CALCIUM SERPL-MCNC: 10.3 MG/DL
CHLORIDE SERPL-SCNC: 103 MMOL/L
CHOLEST SERPL-MCNC: 193 MG/DL
CO2 SERPL-SCNC: 23 MMOL/L
CREAT SERPL-MCNC: 0.62 MG/DL
EGFR: 107 ML/MIN/1.73M2
EOSINOPHIL # BLD AUTO: 0.71 K/UL
EOSINOPHIL NFR BLD AUTO: 5.6 %
ESTIMATED AVERAGE GLUCOSE: 246 MG/DL
FOLATE SERPL-MCNC: 6.8 NG/ML
GLUCOSE SERPL-MCNC: 175 MG/DL
HBA1C MFR BLD HPLC: 10.2 %
HCT VFR BLD CALC: 46 %
HDLC SERPL-MCNC: 43 MG/DL
HGB BLD-MCNC: 15.4 G/DL
IMM GRANULOCYTES NFR BLD AUTO: 0.3 %
LDLC SERPL CALC-MCNC: 115 MG/DL
LYMPHOCYTES # BLD AUTO: 3.29 K/UL
LYMPHOCYTES NFR BLD AUTO: 25.7 %
MAN DIFF?: NORMAL
MCHC RBC-ENTMCNC: 29.8 PG
MCHC RBC-ENTMCNC: 33.5 GM/DL
MCV RBC AUTO: 89.1 FL
MONOCYTES # BLD AUTO: 0.9 K/UL
MONOCYTES NFR BLD AUTO: 7 %
NEUTROPHILS # BLD AUTO: 7.77 K/UL
NEUTROPHILS NFR BLD AUTO: 60.8 %
NONHDLC SERPL-MCNC: 150 MG/DL
PLATELET # BLD AUTO: 282 K/UL
POTASSIUM SERPL-SCNC: 4 MMOL/L
PROT SERPL-MCNC: 7.1 G/DL
PSA SERPL-MCNC: 0.59 NG/ML
RBC # BLD: 5.16 M/UL
RBC # FLD: 13.7 %
SODIUM SERPL-SCNC: 141 MMOL/L
T4 FREE SERPL-MCNC: 1.2 NG/DL
TRIGL SERPL-MCNC: 174 MG/DL
TSH SERPL-ACNC: 1.67 UIU/ML
VIT B12 SERPL-MCNC: 466 PG/ML
WBC # FLD AUTO: 12.79 K/UL

## 2023-04-25 PROCEDURE — 99203 OFFICE O/P NEW LOW 30 MIN: CPT

## 2023-04-25 RX ORDER — SODIUM SULFATE, POTASSIUM SULFATE AND MAGNESIUM SULFATE 1.6; 3.13; 17.5 G/177ML; G/177ML; G/177ML
17.5-3.13-1.6 SOLUTION ORAL
Qty: 1 | Refills: 0 | Status: COMPLETED | COMMUNITY
Start: 2023-04-25 | End: 2023-04-26

## 2023-04-25 NOTE — HISTORY OF PRESENT ILLNESS
[FreeTextEntry1] : Had reported blood with vomiting on March 1. Went to ED, CBC normal, sent home.\par Denies pain or nausea prior to episode. Reports epigastric pain after this but this resolved.\par \par Denies previous EGD or colonoscopy.\par \par Previous PMD: Dr. Erickson ([place]).

## 2023-04-25 NOTE — PHYSICAL EXAM
[Alert] : alert [Normal Voice/Communication] : normal voice/communication [No Acute Distress] : no acute distress [Sclera] : the sclera and conjunctiva were normal [Hearing Threshold Finger Rub Not [place]] : hearing was normal [Normal Lips/Gums] : the lips and gums were normal [Normal Appearance] : the appearance of the neck was normal [No Neck Mass] : no neck mass was observed [No Respiratory Distress] : no respiratory distress [No Acc Muscle Use] : no accessory muscle use [Respiration, Rhythm And Depth] : normal respiratory rhythm and effort [Auscultation Breath Sounds / Voice Sounds] : lungs were clear to auscultation bilaterally [Heart Rate And Rhythm] : heart rate was normal and rhythm regular [Normal S1, S2] : normal S1 and S2 [Murmurs] : no murmurs [Bowel Sounds] : normal bowel sounds [Abdomen Tenderness] : non-tender [No Masses] : no abdominal mass palpated [Abdomen Soft] : soft [Oriented To Time, Place, And Person] : oriented to person, place, and time [Obese (BMI >= 30)] : obese (BMI >= 30) [Wheelchair] : Patient in wheelchair [Normal Color / Pigmentation] : normal skin color and pigmentation [] : no rash [No Focal Deficits] : no focal deficits [de-identified] : missing teeth. Remaining incisors not loose [de-identified] : amputation LE [de-identified] : LE amputation

## 2023-04-25 NOTE — ASSESSMENT
[FreeTextEntry1] : EGD and colonoscopy.\par \par Split-dose Suprep.\par \par Explained the risks, benefits, indications, alternatives. The risks include but are not limited to bleeding, infection, perforation, reaction to anesthesia, or missed lesions. The patient verbalized understanding and wishes to proceed.\par

## 2023-04-25 NOTE — REVIEW OF SYSTEMS
[Negative] : Heme/Lymph [As Noted in HPI] : as noted in HPI [FreeTextEntry7] : episode of hematemesis

## 2023-05-11 ENCOUNTER — APPOINTMENT (OUTPATIENT)
Dept: GASTROENTEROLOGY | Facility: HOSPITAL | Age: 63
End: 2023-05-11

## 2023-05-11 ENCOUNTER — OUTPATIENT (OUTPATIENT)
Dept: OUTPATIENT SERVICES | Facility: HOSPITAL | Age: 63
LOS: 1 days | End: 2023-05-11
Payer: MEDICARE

## 2023-05-11 ENCOUNTER — RESULT REVIEW (OUTPATIENT)
Age: 63
End: 2023-05-11

## 2023-05-11 DIAGNOSIS — Z89.512 ACQUIRED ABSENCE OF LEFT LEG BELOW KNEE: Chronic | ICD-10-CM

## 2023-05-11 DIAGNOSIS — Z12.11 ENCOUNTER FOR SCREENING FOR MALIGNANT NEOPLASM OF COLON: ICD-10-CM

## 2023-05-11 DIAGNOSIS — Z87.19 PERSONAL HISTORY OF OTHER DISEASES OF THE DIGESTIVE SYSTEM: ICD-10-CM

## 2023-05-11 DIAGNOSIS — Z98.41 CATARACT EXTRACTION STATUS, RIGHT EYE: Chronic | ICD-10-CM

## 2023-05-11 LAB — GLUCOSE BLDC GLUCOMTR-MCNC: 184 MG/DL — HIGH (ref 70–99)

## 2023-05-11 PROCEDURE — 88312 SPECIAL STAINS GROUP 1: CPT

## 2023-05-11 PROCEDURE — 45380 COLONOSCOPY AND BIOPSY: CPT

## 2023-05-11 PROCEDURE — 45380 COLONOSCOPY AND BIOPSY: CPT | Mod: PT

## 2023-05-11 PROCEDURE — 88305 TISSUE EXAM BY PATHOLOGIST: CPT

## 2023-05-11 PROCEDURE — 82962 GLUCOSE BLOOD TEST: CPT

## 2023-05-11 PROCEDURE — 88312 SPECIAL STAINS GROUP 1: CPT | Mod: 26

## 2023-05-11 PROCEDURE — 43239 EGD BIOPSY SINGLE/MULTIPLE: CPT

## 2023-05-11 PROCEDURE — 88305 TISSUE EXAM BY PATHOLOGIST: CPT | Mod: 26

## 2023-05-11 RX ORDER — SODIUM CHLORIDE 9 MG/ML
500 INJECTION INTRAMUSCULAR; INTRAVENOUS; SUBCUTANEOUS
Refills: 0 | Status: COMPLETED | OUTPATIENT
Start: 2023-05-11 | End: 2023-05-11

## 2023-05-11 RX ADMIN — SODIUM CHLORIDE 75 MILLILITER(S): 9 INJECTION INTRAMUSCULAR; INTRAVENOUS; SUBCUTANEOUS at 10:52

## 2023-05-15 ENCOUNTER — APPOINTMENT (OUTPATIENT)
Dept: FAMILY MEDICINE | Facility: CLINIC | Age: 63
End: 2023-05-15
Payer: MEDICARE

## 2023-05-15 VITALS — HEART RATE: 76 BPM | DIASTOLIC BLOOD PRESSURE: 65 MMHG | SYSTOLIC BLOOD PRESSURE: 115 MMHG | RESPIRATION RATE: 20 BRPM

## 2023-05-15 LAB — SURGICAL PATHOLOGY STUDY: SIGNIFICANT CHANGE UP

## 2023-05-15 PROCEDURE — 99214 OFFICE O/P EST MOD 30 MIN: CPT | Mod: 25

## 2023-05-15 PROCEDURE — 36415 COLL VENOUS BLD VENIPUNCTURE: CPT

## 2023-05-15 NOTE — HISTORY OF PRESENT ILLNESS
[de-identified] : Presents for BP check and follow-up with labs with attention to sugar.  Note increase in Metformin; pt has yet to schedule an appointment with Endocrine.  Reviewed GI documentation.  Pt states trying to "eat better" but is not self-checking - "I haven't reached that point yet."

## 2023-05-15 NOTE — PHYSICAL EXAM
[No Acute Distress] : no acute distress [Normal] : normal rate, regular rhythm, normal S1 and S2 and no murmur heard [Soft] : abdomen soft [Non Tender] : non-tender [No Focal Deficits] : no focal deficits [Alert and Oriented x3] : oriented to person, place, and time

## 2023-05-16 LAB
ALBUMIN SERPL ELPH-MCNC: 4.3 G/DL
ALP BLD-CCNC: 96 U/L
ALT SERPL-CCNC: 8 U/L
ANION GAP SERPL CALC-SCNC: 16 MMOL/L
AST SERPL-CCNC: 10 U/L
BASOPHILS # BLD AUTO: 0.07 K/UL
BASOPHILS NFR BLD AUTO: 0.6 %
BILIRUB SERPL-MCNC: 0.5 MG/DL
BUN SERPL-MCNC: 24 MG/DL
CALCIUM SERPL-MCNC: 10 MG/DL
CHLORIDE SERPL-SCNC: 102 MMOL/L
CO2 SERPL-SCNC: 22 MMOL/L
CREAT SERPL-MCNC: 0.98 MG/DL
EGFR: 87 ML/MIN/1.73M2
EOSINOPHIL # BLD AUTO: 0.42 K/UL
EOSINOPHIL NFR BLD AUTO: 3.7 %
ESTIMATED AVERAGE GLUCOSE: 212 MG/DL
GLUCOSE SERPL-MCNC: 153 MG/DL
HBA1C MFR BLD HPLC: 9 %
HCT VFR BLD CALC: 44.3 %
HGB BLD-MCNC: 14.6 G/DL
IMM GRANULOCYTES NFR BLD AUTO: 0.3 %
LYMPHOCYTES # BLD AUTO: 2.05 K/UL
LYMPHOCYTES NFR BLD AUTO: 18 %
MAN DIFF?: NORMAL
MCHC RBC-ENTMCNC: 30 PG
MCHC RBC-ENTMCNC: 33 GM/DL
MCV RBC AUTO: 91.2 FL
MONOCYTES # BLD AUTO: 0.78 K/UL
MONOCYTES NFR BLD AUTO: 6.8 %
NEUTROPHILS # BLD AUTO: 8.06 K/UL
NEUTROPHILS NFR BLD AUTO: 70.6 %
PLATELET # BLD AUTO: 246 K/UL
POTASSIUM SERPL-SCNC: 5 MMOL/L
PROT SERPL-MCNC: 7.1 G/DL
RBC # BLD: 4.86 M/UL
RBC # FLD: 14.4 %
SODIUM SERPL-SCNC: 140 MMOL/L
WBC # FLD AUTO: 11.41 K/UL

## 2023-05-21 ENCOUNTER — NON-APPOINTMENT (OUTPATIENT)
Age: 63
End: 2023-05-21

## 2023-06-13 ENCOUNTER — APPOINTMENT (OUTPATIENT)
Dept: GASTROENTEROLOGY | Facility: CLINIC | Age: 63
End: 2023-06-13
Payer: MEDICARE

## 2023-06-13 VITALS
RESPIRATION RATE: 20 BRPM | DIASTOLIC BLOOD PRESSURE: 59 MMHG | OXYGEN SATURATION: 94 % | SYSTOLIC BLOOD PRESSURE: 93 MMHG | HEIGHT: 72 IN | HEART RATE: 68 BPM | BODY MASS INDEX: 31.83 KG/M2 | TEMPERATURE: 98 F | WEIGHT: 235 LBS

## 2023-06-13 DIAGNOSIS — K63.5 POLYP OF COLON: ICD-10-CM

## 2023-06-13 DIAGNOSIS — Z12.11 ENCOUNTER FOR SCREENING FOR MALIGNANT NEOPLASM OF COLON: ICD-10-CM

## 2023-06-13 PROCEDURE — 99213 OFFICE O/P EST LOW 20 MIN: CPT

## 2023-07-17 PROBLEM — Z12.11 COLON CANCER SCREENING: Noted: 2023-04-25

## 2023-07-17 PROBLEM — K63.5 COLON POLYPS: Status: ACTIVE | Noted: 2023-07-17

## 2023-07-17 NOTE — REVIEW OF SYSTEMS
[As Noted in HPI] : as noted in HPI [Vomiting] : no vomiting [Bleeding] : no bleeding [Negative] : Heme/Lymph [FreeTextEntry7] : no new episode of hematemesis

## 2023-07-17 NOTE — ASSESSMENT
[FreeTextEntry1] : EGD recommended in 6 months for rebiopsy to confirm Lees's.\par \par Advised surveillance colonoscopy in 3 years.\par \par \par \par

## 2023-07-17 NOTE — PHYSICAL EXAM
[Alert] : alert [Normal Voice/Communication] : normal voice/communication [No Acute Distress] : no acute distress [Obese (BMI >= 30)] : obese (BMI >= 30) [Sclera] : the sclera and conjunctiva were normal [Hearing Threshold Finger Rub Not [place]] : hearing was normal [Normal Lips/Gums] : the lips and gums were normal [Normal Appearance] : the appearance of the neck was normal [No Neck Mass] : no neck mass was observed [No Respiratory Distress] : no respiratory distress [No Acc Muscle Use] : no accessory muscle use [Respiration, Rhythm And Depth] : normal respiratory rhythm and effort [Auscultation Breath Sounds / Voice Sounds] : lungs were clear to auscultation bilaterally [Heart Rate And Rhythm] : heart rate was normal and rhythm regular [Normal S1, S2] : normal S1 and S2 [Murmurs] : no murmurs [Bowel Sounds] : normal bowel sounds [Abdomen Tenderness] : non-tender [No Masses] : no abdominal mass palpated [Abdomen Soft] : soft [Wheelchair] : Patient in wheelchair [Normal Color / Pigmentation] : normal skin color and pigmentation [] : no rash [No Focal Deficits] : no focal deficits [Oriented To Time, Place, And Person] : oriented to person, place, and time [de-identified] : missing teeth. Remaining incisors not loose [de-identified] : amputation LE [de-identified] : LE amputation

## 2023-07-17 NOTE — HISTORY OF PRESENT ILLNESS
[FreeTextEntry1] : Follow up visit after EGD and colonoscopy. The colonoscopy showed adenomatous polyps.\par EGD biopsies showed possible Lees's.\par

## 2023-07-20 ENCOUNTER — APPOINTMENT (OUTPATIENT)
Dept: FAMILY MEDICINE | Facility: CLINIC | Age: 63
End: 2023-07-20
Payer: MEDICARE

## 2023-07-20 VITALS — HEART RATE: 76 BPM | RESPIRATION RATE: 20 BRPM | DIASTOLIC BLOOD PRESSURE: 62 MMHG | SYSTOLIC BLOOD PRESSURE: 122 MMHG

## 2023-07-20 PROCEDURE — 99214 OFFICE O/P EST MOD 30 MIN: CPT | Mod: 25

## 2023-07-20 PROCEDURE — 36415 COLL VENOUS BLD VENIPUNCTURE: CPT

## 2023-07-20 RX ORDER — GABAPENTIN 300 MG/1
300 CAPSULE ORAL
Qty: 180 | Refills: 3 | Status: DISCONTINUED | COMMUNITY
Start: 2023-04-07 | End: 2023-07-20

## 2023-07-20 NOTE — HISTORY OF PRESENT ILLNESS
[de-identified] : Presents for BP check, labs, and general follow-up.  States feeling generally well; has been trying to improve his diet with goal of better glucose control, but is not self-checking.

## 2023-07-21 LAB
ALBUMIN SERPL ELPH-MCNC: 4.3 G/DL
ALP BLD-CCNC: 91 U/L
ALT SERPL-CCNC: 10 U/L
ANION GAP SERPL CALC-SCNC: 11 MMOL/L
AST SERPL-CCNC: 10 U/L
BILIRUB SERPL-MCNC: 0.4 MG/DL
BUN SERPL-MCNC: 17 MG/DL
CALCIUM SERPL-MCNC: 9.2 MG/DL
CHLORIDE SERPL-SCNC: 104 MMOL/L
CHOLEST SERPL-MCNC: 144 MG/DL
CO2 SERPL-SCNC: 23 MMOL/L
CREAT SERPL-MCNC: 0.78 MG/DL
EGFR: 100 ML/MIN/1.73M2
ESTIMATED AVERAGE GLUCOSE: 143 MG/DL
GLUCOSE SERPL-MCNC: 122 MG/DL
HBA1C MFR BLD HPLC: 6.6 %
HDLC SERPL-MCNC: 42 MG/DL
LDLC SERPL CALC-MCNC: 75 MG/DL
NONHDLC SERPL-MCNC: 102 MG/DL
POTASSIUM SERPL-SCNC: 4.2 MMOL/L
PROT SERPL-MCNC: 6.7 G/DL
SODIUM SERPL-SCNC: 138 MMOL/L
TRIGL SERPL-MCNC: 155 MG/DL

## 2023-10-19 ENCOUNTER — APPOINTMENT (OUTPATIENT)
Dept: FAMILY MEDICINE | Facility: CLINIC | Age: 63
End: 2023-10-19
Payer: MEDICARE

## 2023-10-19 VITALS
WEIGHT: 235 LBS | SYSTOLIC BLOOD PRESSURE: 126 MMHG | BODY MASS INDEX: 31.83 KG/M2 | DIASTOLIC BLOOD PRESSURE: 78 MMHG | HEART RATE: 76 BPM | RESPIRATION RATE: 20 BRPM | HEIGHT: 72 IN

## 2023-10-19 DIAGNOSIS — M86.9 OSTEOMYELITIS, UNSPECIFIED: ICD-10-CM

## 2023-10-19 PROCEDURE — 99214 OFFICE O/P EST MOD 30 MIN: CPT | Mod: 25

## 2023-10-19 PROCEDURE — 36415 COLL VENOUS BLD VENIPUNCTURE: CPT

## 2023-10-20 LAB
ALBUMIN SERPL ELPH-MCNC: 4.4 G/DL
ALP BLD-CCNC: 110 U/L
ALT SERPL-CCNC: 12 U/L
ANION GAP SERPL CALC-SCNC: 12 MMOL/L
AST SERPL-CCNC: 11 U/L
BILIRUB SERPL-MCNC: 0.4 MG/DL
BUN SERPL-MCNC: 15 MG/DL
CALCIUM SERPL-MCNC: 9.7 MG/DL
CHLORIDE SERPL-SCNC: 103 MMOL/L
CHOLEST SERPL-MCNC: 168 MG/DL
CO2 SERPL-SCNC: 25 MMOL/L
CREAT SERPL-MCNC: 0.78 MG/DL
EGFR: 100 ML/MIN/1.73M2
ESTIMATED AVERAGE GLUCOSE: 166 MG/DL
GLUCOSE SERPL-MCNC: 152 MG/DL
HBA1C MFR BLD HPLC: 7.4 %
HCT VFR BLD CALC: 46.1 %
HDLC SERPL-MCNC: 48 MG/DL
HGB BLD-MCNC: 15.5 G/DL
LDLC SERPL CALC-MCNC: 79 MG/DL
MCHC RBC-ENTMCNC: 30 PG
MCHC RBC-ENTMCNC: 33.6 GM/DL
MCV RBC AUTO: 89.3 FL
NONHDLC SERPL-MCNC: 120 MG/DL
PLATELET # BLD AUTO: 226 K/UL
POTASSIUM SERPL-SCNC: 4 MMOL/L
PROT SERPL-MCNC: 7.3 G/DL
RBC # BLD: 5.16 M/UL
RBC # FLD: 13.7 %
SODIUM SERPL-SCNC: 140 MMOL/L
TRIGL SERPL-MCNC: 252 MG/DL
WBC # FLD AUTO: 9.01 K/UL

## 2023-12-11 ENCOUNTER — APPOINTMENT (OUTPATIENT)
Dept: GASTROENTEROLOGY | Facility: CLINIC | Age: 63
End: 2023-12-11
Payer: MEDICARE

## 2023-12-11 VITALS
HEIGHT: 72 IN | BODY MASS INDEX: 31.15 KG/M2 | WEIGHT: 230 LBS | RESPIRATION RATE: 16 BRPM | DIASTOLIC BLOOD PRESSURE: 82 MMHG | SYSTOLIC BLOOD PRESSURE: 163 MMHG | OXYGEN SATURATION: 95 % | TEMPERATURE: 98 F | HEART RATE: 87 BPM

## 2023-12-11 DIAGNOSIS — K22.70 BARRETT'S ESOPHAGUS W/OUT DYSPLASIA: ICD-10-CM

## 2023-12-11 DIAGNOSIS — Z87.19 PERSONAL HISTORY OF OTHER DISEASES OF THE DIGESTIVE SYSTEM: ICD-10-CM

## 2023-12-11 PROCEDURE — 99214 OFFICE O/P EST MOD 30 MIN: CPT

## 2023-12-11 RX ORDER — OXYCODONE AND ACETAMINOPHEN 5; 325 MG/1; MG/1
5-325 TABLET ORAL
Qty: 28 | Refills: 0 | Status: COMPLETED | COMMUNITY
Start: 2023-10-19 | End: 2023-12-11

## 2024-02-28 ENCOUNTER — APPOINTMENT (OUTPATIENT)
Dept: FAMILY MEDICINE | Facility: CLINIC | Age: 64
End: 2024-02-28
Payer: MEDICARE

## 2024-02-28 VITALS
DIASTOLIC BLOOD PRESSURE: 68 MMHG | SYSTOLIC BLOOD PRESSURE: 126 MMHG | HEIGHT: 72 IN | BODY MASS INDEX: 32.23 KG/M2 | RESPIRATION RATE: 20 BRPM | HEART RATE: 76 BPM | WEIGHT: 238 LBS

## 2024-02-28 PROCEDURE — 99214 OFFICE O/P EST MOD 30 MIN: CPT | Mod: 25

## 2024-02-28 PROCEDURE — 36415 COLL VENOUS BLD VENIPUNCTURE: CPT

## 2024-02-28 NOTE — HISTORY OF PRESENT ILLNESS
[de-identified] : Presents for BP check, labs, and general follow-up.  Trying to maintain a healthy diet and remain as active as possible - using crutches for ambulation.

## 2024-02-28 NOTE — PHYSICAL EXAM
[No Acute Distress] : no acute distress [Normal] : normal rate, regular rhythm, normal S1 and S2 and no murmur heard [Soft] : abdomen soft [Non Tender] : non-tender [Normal Posterior Cervical Nodes] : no posterior cervical lymphadenopathy [Normal Anterior Cervical Nodes] : no anterior cervical lymphadenopathy [No Focal Deficits] : no focal deficits [Alert and Oriented x3] : oriented to person, place, and time

## 2024-02-29 LAB
ALBUMIN SERPL ELPH-MCNC: 4.6 G/DL
ALP BLD-CCNC: 102 U/L
ALT SERPL-CCNC: 14 U/L
ANION GAP SERPL CALC-SCNC: 14 MMOL/L
AST SERPL-CCNC: 14 U/L
BILIRUB SERPL-MCNC: 0.9 MG/DL
BUN SERPL-MCNC: 15 MG/DL
CALCIUM SERPL-MCNC: 9.9 MG/DL
CHLORIDE SERPL-SCNC: 101 MMOL/L
CHOLEST SERPL-MCNC: 179 MG/DL
CO2 SERPL-SCNC: 23 MMOL/L
CREAT SERPL-MCNC: 0.79 MG/DL
EGFR: 99 ML/MIN/1.73M2
ESTIMATED AVERAGE GLUCOSE: 183 MG/DL
GLUCOSE SERPL-MCNC: 132 MG/DL
HBA1C MFR BLD HPLC: 8 %
HCT VFR BLD CALC: 44.2 %
HDLC SERPL-MCNC: 47 MG/DL
HGB BLD-MCNC: 14.8 G/DL
LDLC SERPL CALC-MCNC: 107 MG/DL
MCHC RBC-ENTMCNC: 30.3 PG
MCHC RBC-ENTMCNC: 33.5 GM/DL
MCV RBC AUTO: 90.6 FL
NONHDLC SERPL-MCNC: 133 MG/DL
PLATELET # BLD AUTO: 210 K/UL
POTASSIUM SERPL-SCNC: 4.6 MMOL/L
PROT SERPL-MCNC: 7.5 G/DL
RBC # BLD: 4.88 M/UL
RBC # FLD: 13.6 %
SODIUM SERPL-SCNC: 138 MMOL/L
TRIGL SERPL-MCNC: 144 MG/DL
WBC # FLD AUTO: 8.09 K/UL

## 2024-03-01 ENCOUNTER — NON-APPOINTMENT (OUTPATIENT)
Age: 64
End: 2024-03-01

## 2024-03-19 NOTE — ED PROVIDER NOTE - CROS ED SKIN ALL NEG
negative... 40-year-old female with past medical history of work injury resulting in lower extremity pain since April 2023, CRPS presents with feet pain.  Patient states for the past few weeks with intermittent discoloration of feet as well as pain.  Patient states her feet are normal color at this time but states throughout the day they become black and blue at times.  Patient denies any further trauma or falls. Pt denies fevers/chills, ha, loc, focal neuro deficits, cp/sob/palp, cough, abd pain/n/v/d, urinary symptoms, recent travel and sick contacts.

## 2024-03-20 ENCOUNTER — INPATIENT (INPATIENT)
Facility: HOSPITAL | Age: 64
LOS: 4 days | Discharge: NURSING, REHAB OR LONG-TERM CARE | DRG: 481 | End: 2024-03-25
Attending: FAMILY MEDICINE | Admitting: STUDENT IN AN ORGANIZED HEALTH CARE EDUCATION/TRAINING PROGRAM
Payer: MEDICARE

## 2024-03-20 VITALS
HEIGHT: 76 IN | HEART RATE: 77 BPM | DIASTOLIC BLOOD PRESSURE: 92 MMHG | TEMPERATURE: 97 F | OXYGEN SATURATION: 97 % | SYSTOLIC BLOOD PRESSURE: 164 MMHG | RESPIRATION RATE: 18 BRPM | WEIGHT: 235.01 LBS

## 2024-03-20 DIAGNOSIS — S72.009A FRACTURE OF UNSPECIFIED PART OF NECK OF UNSPECIFIED FEMUR, INITIAL ENCOUNTER FOR CLOSED FRACTURE: ICD-10-CM

## 2024-03-20 DIAGNOSIS — Z98.41 CATARACT EXTRACTION STATUS, RIGHT EYE: Chronic | ICD-10-CM

## 2024-03-20 DIAGNOSIS — Z89.512 ACQUIRED ABSENCE OF LEFT LEG BELOW KNEE: Chronic | ICD-10-CM

## 2024-03-20 LAB
ALBUMIN SERPL ELPH-MCNC: 3.6 G/DL — SIGNIFICANT CHANGE UP (ref 3.3–5)
ALP SERPL-CCNC: 87 U/L — SIGNIFICANT CHANGE UP (ref 40–120)
ALT FLD-CCNC: 16 U/L — SIGNIFICANT CHANGE UP (ref 10–45)
ANION GAP SERPL CALC-SCNC: 9 MMOL/L — SIGNIFICANT CHANGE UP (ref 5–17)
APTT BLD: 31.6 SEC — SIGNIFICANT CHANGE UP (ref 24.5–35.6)
AST SERPL-CCNC: 15 U/L — SIGNIFICANT CHANGE UP (ref 10–40)
BASOPHILS # BLD AUTO: 0.05 K/UL — SIGNIFICANT CHANGE UP (ref 0–0.2)
BASOPHILS NFR BLD AUTO: 0.3 % — SIGNIFICANT CHANGE UP (ref 0–2)
BILIRUB SERPL-MCNC: 0.6 MG/DL — SIGNIFICANT CHANGE UP (ref 0.2–1.2)
BLD GP AB SCN SERPL QL: SIGNIFICANT CHANGE UP
BUN SERPL-MCNC: 18 MG/DL — SIGNIFICANT CHANGE UP (ref 7–23)
CALCIUM SERPL-MCNC: 9.3 MG/DL — SIGNIFICANT CHANGE UP (ref 8.4–10.5)
CHLORIDE SERPL-SCNC: 98 MMOL/L — SIGNIFICANT CHANGE UP (ref 96–108)
CO2 SERPL-SCNC: 27 MMOL/L — SIGNIFICANT CHANGE UP (ref 22–31)
CREAT SERPL-MCNC: 1 MG/DL — SIGNIFICANT CHANGE UP (ref 0.5–1.3)
EGFR: 84 ML/MIN/1.73M2 — SIGNIFICANT CHANGE UP
EOSINOPHIL # BLD AUTO: 0.06 K/UL — SIGNIFICANT CHANGE UP (ref 0–0.5)
EOSINOPHIL NFR BLD AUTO: 0.4 % — SIGNIFICANT CHANGE UP (ref 0–6)
GLUCOSE BLDC GLUCOMTR-MCNC: 173 MG/DL — HIGH (ref 70–99)
GLUCOSE SERPL-MCNC: 199 MG/DL — HIGH (ref 70–99)
HCT VFR BLD CALC: 41.6 % — SIGNIFICANT CHANGE UP (ref 39–50)
HGB BLD-MCNC: 14.5 G/DL — SIGNIFICANT CHANGE UP (ref 13–17)
IMM GRANULOCYTES NFR BLD AUTO: 0.5 % — SIGNIFICANT CHANGE UP (ref 0–0.9)
INR BLD: 1.03 RATIO — SIGNIFICANT CHANGE UP (ref 0.85–1.18)
LYMPHOCYTES # BLD AUTO: 1.1 K/UL — SIGNIFICANT CHANGE UP (ref 1–3.3)
LYMPHOCYTES # BLD AUTO: 7.6 % — LOW (ref 13–44)
MCHC RBC-ENTMCNC: 30.5 PG — SIGNIFICANT CHANGE UP (ref 27–34)
MCHC RBC-ENTMCNC: 34.9 GM/DL — SIGNIFICANT CHANGE UP (ref 32–36)
MCV RBC AUTO: 87.4 FL — SIGNIFICANT CHANGE UP (ref 80–100)
MONOCYTES # BLD AUTO: 0.67 K/UL — SIGNIFICANT CHANGE UP (ref 0–0.9)
MONOCYTES NFR BLD AUTO: 4.7 % — SIGNIFICANT CHANGE UP (ref 2–14)
NEUTROPHILS # BLD AUTO: 12.43 K/UL — HIGH (ref 1.8–7.4)
NEUTROPHILS NFR BLD AUTO: 86.5 % — HIGH (ref 43–77)
NRBC # BLD: 0 /100 WBCS — SIGNIFICANT CHANGE UP (ref 0–0)
PLATELET # BLD AUTO: 221 K/UL — SIGNIFICANT CHANGE UP (ref 150–400)
POTASSIUM SERPL-MCNC: 4.9 MMOL/L — SIGNIFICANT CHANGE UP (ref 3.5–5.3)
POTASSIUM SERPL-SCNC: 4.9 MMOL/L — SIGNIFICANT CHANGE UP (ref 3.5–5.3)
PROT SERPL-MCNC: 7.5 G/DL — SIGNIFICANT CHANGE UP (ref 6–8.3)
PROTHROM AB SERPL-ACNC: 12 SEC — SIGNIFICANT CHANGE UP (ref 9.5–13)
RBC # BLD: 4.76 M/UL — SIGNIFICANT CHANGE UP (ref 4.2–5.8)
RBC # FLD: 13.1 % — SIGNIFICANT CHANGE UP (ref 10.3–14.5)
SODIUM SERPL-SCNC: 134 MMOL/L — LOW (ref 135–145)
WBC # BLD: 14.38 K/UL — HIGH (ref 3.8–10.5)
WBC # FLD AUTO: 14.38 K/UL — HIGH (ref 3.8–10.5)

## 2024-03-20 PROCEDURE — 99223 1ST HOSP IP/OBS HIGH 75: CPT

## 2024-03-20 PROCEDURE — 76376 3D RENDER W/INTRP POSTPROCES: CPT | Mod: 26

## 2024-03-20 PROCEDURE — 99285 EMERGENCY DEPT VISIT HI MDM: CPT

## 2024-03-20 PROCEDURE — 93010 ELECTROCARDIOGRAM REPORT: CPT

## 2024-03-20 PROCEDURE — 72192 CT PELVIS W/O DYE: CPT | Mod: 26,MC

## 2024-03-20 RX ORDER — SODIUM CHLORIDE 9 MG/ML
1000 INJECTION, SOLUTION INTRAVENOUS
Refills: 0 | Status: DISCONTINUED | OUTPATIENT
Start: 2024-03-20 | End: 2024-03-22

## 2024-03-20 RX ORDER — DEXTROSE 50 % IN WATER 50 %
25 SYRINGE (ML) INTRAVENOUS ONCE
Refills: 0 | Status: DISCONTINUED | OUTPATIENT
Start: 2024-03-20 | End: 2024-03-22

## 2024-03-20 RX ORDER — HYDROMORPHONE HYDROCHLORIDE 2 MG/ML
1 INJECTION INTRAMUSCULAR; INTRAVENOUS; SUBCUTANEOUS EVERY 4 HOURS
Refills: 0 | Status: DISCONTINUED | OUTPATIENT
Start: 2024-03-20 | End: 2024-03-22

## 2024-03-20 RX ORDER — DEXTROSE 50 % IN WATER 50 %
12.5 SYRINGE (ML) INTRAVENOUS ONCE
Refills: 0 | Status: DISCONTINUED | OUTPATIENT
Start: 2024-03-20 | End: 2024-03-22

## 2024-03-20 RX ORDER — MORPHINE SULFATE 50 MG/1
4 CAPSULE, EXTENDED RELEASE ORAL ONCE
Refills: 0 | Status: DISCONTINUED | OUTPATIENT
Start: 2024-03-20 | End: 2024-03-20

## 2024-03-20 RX ORDER — DEXTROSE 50 % IN WATER 50 %
15 SYRINGE (ML) INTRAVENOUS ONCE
Refills: 0 | Status: DISCONTINUED | OUTPATIENT
Start: 2024-03-20 | End: 2024-03-22

## 2024-03-20 RX ORDER — ONDANSETRON 8 MG/1
4 TABLET, FILM COATED ORAL EVERY 8 HOURS
Refills: 0 | Status: DISCONTINUED | OUTPATIENT
Start: 2024-03-20 | End: 2024-03-22

## 2024-03-20 RX ORDER — SODIUM CHLORIDE 9 MG/ML
1000 INJECTION, SOLUTION INTRAVENOUS
Refills: 0 | Status: DISCONTINUED | OUTPATIENT
Start: 2024-03-20 | End: 2024-03-20

## 2024-03-20 RX ORDER — ACETAMINOPHEN 500 MG
650 TABLET ORAL EVERY 6 HOURS
Refills: 0 | Status: DISCONTINUED | OUTPATIENT
Start: 2024-03-20 | End: 2024-03-22

## 2024-03-20 RX ORDER — SODIUM CHLORIDE 9 MG/ML
1000 INJECTION INTRAMUSCULAR; INTRAVENOUS; SUBCUTANEOUS
Refills: 0 | Status: DISCONTINUED | OUTPATIENT
Start: 2024-03-20 | End: 2024-03-22

## 2024-03-20 RX ORDER — LANOLIN ALCOHOL/MO/W.PET/CERES
3 CREAM (GRAM) TOPICAL AT BEDTIME
Refills: 0 | Status: DISCONTINUED | OUTPATIENT
Start: 2024-03-20 | End: 2024-03-22

## 2024-03-20 RX ORDER — INSULIN LISPRO 100/ML
VIAL (ML) SUBCUTANEOUS
Refills: 0 | Status: DISCONTINUED | OUTPATIENT
Start: 2024-03-20 | End: 2024-03-22

## 2024-03-20 RX ORDER — GLUCAGON INJECTION, SOLUTION 0.5 MG/.1ML
1 INJECTION, SOLUTION SUBCUTANEOUS ONCE
Refills: 0 | Status: DISCONTINUED | OUTPATIENT
Start: 2024-03-20 | End: 2024-03-22

## 2024-03-20 RX ORDER — SENNA PLUS 8.6 MG/1
2 TABLET ORAL AT BEDTIME
Refills: 0 | Status: DISCONTINUED | OUTPATIENT
Start: 2024-03-20 | End: 2024-03-22

## 2024-03-20 RX ORDER — PANTOPRAZOLE SODIUM 20 MG/1
40 TABLET, DELAYED RELEASE ORAL
Refills: 0 | Status: DISCONTINUED | OUTPATIENT
Start: 2024-03-20 | End: 2024-03-22

## 2024-03-20 RX ORDER — INSULIN LISPRO 100/ML
VIAL (ML) SUBCUTANEOUS AT BEDTIME
Refills: 0 | Status: DISCONTINUED | OUTPATIENT
Start: 2024-03-20 | End: 2024-03-22

## 2024-03-20 RX ORDER — POLYETHYLENE GLYCOL 3350 17 G/17G
17 POWDER, FOR SOLUTION ORAL DAILY
Refills: 0 | Status: DISCONTINUED | OUTPATIENT
Start: 2024-03-20 | End: 2024-03-22

## 2024-03-20 RX ORDER — HYDROMORPHONE HYDROCHLORIDE 2 MG/ML
2 INJECTION INTRAMUSCULAR; INTRAVENOUS; SUBCUTANEOUS EVERY 4 HOURS
Refills: 0 | Status: DISCONTINUED | OUTPATIENT
Start: 2024-03-20 | End: 2024-03-22

## 2024-03-20 RX ADMIN — SODIUM CHLORIDE 100 MILLILITER(S): 9 INJECTION INTRAMUSCULAR; INTRAVENOUS; SUBCUTANEOUS at 23:46

## 2024-03-20 RX ADMIN — MORPHINE SULFATE 4 MILLIGRAM(S): 50 CAPSULE, EXTENDED RELEASE ORAL at 18:09

## 2024-03-20 RX ADMIN — MORPHINE SULFATE 4 MILLIGRAM(S): 50 CAPSULE, EXTENDED RELEASE ORAL at 20:35
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
## 2024-03-20 NOTE — H&P ADULT - NSHPLABSRESULTS_GEN_ALL_CORE
14.5   14.38 )-----------( 221      ( 20 Mar 2024 18:05 )             41.6       03-20    134<L>  |  98  |  18  ----------------------------<  199<H>  4.9   |  27  |  1.00    Ca    9.3      20 Mar 2024 18:05    TPro  7.5  /  Alb  3.6  /  TBili  0.6  /  DBili  x   /  AST  15  /  ALT  16  /  AlkPhos  87  03-20         LIVER FUNCTIONS - ( 20 Mar 2024 18:05 )  Alb: 3.6 g/dL / Pro: 7.5 g/dL / ALK PHOS: 87 U/L / ALT: 16 U/L / AST: 15 U/L / GGT: x               PT/INR - ( 20 Mar 2024 18:05 )   PT: 12.0 sec;   INR: 1.03 ratio         PTT - ( 20 Mar 2024 18:05 )  PTT:31.6 sec          Urinalysis Basic - ( 20 Mar 2024 18:05 )    Color: x / Appearance: x / SG: x / pH: x  Gluc: 199 mg/dL / Ketone: x  / Bili: x / Urobili: x   Blood: x / Protein: x / Nitrite: x   Leuk Esterase: x / RBC: x / WBC x   Sq Epi: x / Non Sq Epi: x / Bacteria: x        CAPILLARY BLOOD GLUCOSE            EKG:      CXR: wet read

## 2024-03-20 NOTE — ED ADULT NURSE NOTE - OBJECTIVE STATEMENT
Pt BIBA from home s/p fall outside his home onto his left side c/o left hip pain. pt with PMH of amputation to left LE, states he walks with crutches to get around and when he was using the crutches today he fell. Denies any heads trike or LOC. Denies taking any blood thinners.

## 2024-03-20 NOTE — PATIENT PROFILE ADULT - NSTOBACCO TYPE_GEN_A_CORE_RD
I have personally performed a face to face diagnostic evaluation on this patient. I have reviewed the ACP note and agree with the history, exam and plan of care, except as noted.
Cigarettes

## 2024-03-20 NOTE — ED PROVIDER NOTE - PHYSICAL EXAMINATION
left hip pain s/p BKA amputation, patient is in significat pain, will re-examine after pain med given

## 2024-03-20 NOTE — ED PROVIDER NOTE - OBJECTIVE STATEMENT
63 year old male with left hip pain s/p mechanical fall today. Denies LOc, hitting head, NVD, focal weakness/numbness, reports left extremity pain.

## 2024-03-20 NOTE — PATIENT PROFILE ADULT - FLU SEASON?
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    Impression  - Cholangitis in the setting of occlusion of previously placed stent, now s/p ERCP on 1/7/2018 with placement of a new plastic stent into the common bile duct, now improved significantly   - Invasive ampullary adenocarcinoma s/p ERCP at OSH with a CBD stent placement    Recommendations    - trend CBC, CMP  - continue IV hydration  - continue antibiotics, broad spectrum for 5-7 days   - plan for surgery for resection of the ampullary adenocarcinoma  - advance diet as tolerated   - supportive care as per primary team  - patient can follow with Dr Tuttle as outpatient on discharge ([phone])    Please call us back as needed.     Joann Go, PGY-5  GI fellow  [phone]- [phone]/ [phone]  Please call GI fellow on call after 5pm and on weekends Yes...
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
## 2024-03-20 NOTE — ED PROVIDER NOTE - CADM POA URETHRAL CATHETER
[General Appearance - Alert] : alert [General Appearance - In No Acute Distress] : in no acute distress [Sclera] : the sclera and conjunctiva were normal [PERRL With Normal Accommodation] : pupils were equal in size, round, and reactive to light [Extraocular Movements] : extraocular movements were intact [Outer Ear] : the ears and nose were normal in appearance [Oropharynx] : the oropharynx was normal [Neck Appearance] : the appearance of the neck was normal [Neck Cervical Mass (___cm)] : no neck mass was observed [Jugular Venous Distention Increased] : there was no jugular-venous distention [Thyroid Diffuse Enlargement] : the thyroid was not enlarged [Thyroid Nodule] : there were no palpable thyroid nodules [Auscultation Breath Sounds / Voice Sounds] : lungs were clear to auscultation bilaterally [Heart Rate And Rhythm] : heart rate was normal and rhythm regular [Heart Sounds] : normal S1 and S2 [Heart Sounds Gallop] : no gallops [Murmurs] : no murmurs [Heart Sounds Pericardial Friction Rub] : no pericardial rub [Bowel Sounds] : normal bowel sounds [Abdomen Soft] : soft [] : no hepato-splenomegaly [Abdomen Mass (___ Cm)] : no abdominal mass palpated [FreeTextEntry1] : LLQ tenderness, guarding [No CVA Tenderness] : no ~M costovertebral angle tenderness [No Spinal Tenderness] : no spinal tenderness [Abnormal Walk] : normal gait [Nail Clubbing] : no clubbing  or cyanosis of the fingernails [Musculoskeletal - Swelling] : no joint swelling seen [Motor Tone] : muscle strength and tone were normal [Oriented To Time, Place, And Person] : oriented to person, place, and time [Impaired Insight] : insight and judgment were intact No [Affect] : the affect was normal

## 2024-03-20 NOTE — ED ADULT NURSE REASSESSMENT NOTE - NS ED NURSE REASSESS COMMENT FT1
pt to be admitted and to see orthopedist for left femur fracture per MD silvestre at this time. pt made aware of plan of care at this time, will continue to monitor.

## 2024-03-20 NOTE — H&P ADULT - NSHPPHYSICALEXAM_GEN_ALL_CORE
Vital Signs Last 24 Hrs  T(C): 36 (20 Mar 2024 16:52), Max: 36 (20 Mar 2024 16:52)  T(F): 96.8 (20 Mar 2024 16:52), Max: 96.8 (20 Mar 2024 16:52)  HR: 98 (20 Mar 2024 21:45) (77 - 98)  BP: 98/58 (20 Mar 2024 21:45) (98/58 - 164/92)  BP(mean): --  RR: 17 (20 Mar 2024 21:45) (17 - 18)  SpO2: 96% (20 Mar 2024 21:45) (96% - 97%)    Parameters below as of 20 Mar 2024 21:45  Patient On (Oxygen Delivery Method): room air      Daily Height in cm: 193.04 (20 Mar 2024 16:52)    Daily   CAPILLARY BLOOD GLUCOSE        I&O's Summary      GENERAL: NAD  HEAD:  Normocephalic  EYES: EOMI, PERRLA, conjunctiva and sclera clear  ENMT: No tonsillar erythema, exudates, or enlargement; Moist mucous membranes, No lesions  NECK: Supple, No JVD, no bruit, normal thyroid  NERVOUS SYSTEM:  Alert & Oriented X3, Good concentration; grossly  Motor Strength 5/5 B/L upper and lower extremities; DTRs 2+ intact and symmetric  CHEST/LUNG: Clear to auscultation bilaterally; No rales, rhonchi, wheezing, or rubs  HEART: Regular rate and rhythm; No murmurs, rubs, or gallops  ABDOMEN: Soft, Nontender, Nondistended; Bowel sounds present  EXTREMITIES:  left bka, LLE tender to touch, normal RLE   LYMPH: No lymphadenopathy noted  SKIN: No rashes or lesions

## 2024-03-20 NOTE — ED ADULT NURSE NOTE - NSFALLHARMRISKINTERV_ED_ALL_ED

## 2024-03-20 NOTE — ED PROVIDER NOTE - CLINICAL SUMMARY MEDICAL DECISION MAKING FREE TEXT BOX
63 year old male with left leg pain s/p mechanical fall, patient declined x ray, morphine 4mg given for pain control, and CT obtained showing left proximal femure fracture, spoke with ortho, admitted to medicine for further management

## 2024-03-21 ENCOUNTER — RESULT REVIEW (OUTPATIENT)
Age: 64
End: 2024-03-21

## 2024-03-21 ENCOUNTER — TRANSCRIPTION ENCOUNTER (OUTPATIENT)
Age: 64
End: 2024-03-21

## 2024-03-21 DIAGNOSIS — R29.898 OTHER SYMPTOMS AND SIGNS INVOLVING THE MUSCULOSKELETAL SYSTEM: ICD-10-CM

## 2024-03-21 LAB
A1C WITH ESTIMATED AVERAGE GLUCOSE RESULT: 7.7 % — HIGH (ref 4–5.6)
ALBUMIN SERPL ELPH-MCNC: 3.3 G/DL — SIGNIFICANT CHANGE UP (ref 3.3–5)
ALP SERPL-CCNC: 80 U/L — SIGNIFICANT CHANGE UP (ref 40–120)
ALT FLD-CCNC: 21 U/L — SIGNIFICANT CHANGE UP (ref 10–45)
ANION GAP SERPL CALC-SCNC: 11 MMOL/L — SIGNIFICANT CHANGE UP (ref 5–17)
APTT BLD: 31.2 SEC — SIGNIFICANT CHANGE UP (ref 24.5–35.6)
AST SERPL-CCNC: 18 U/L — SIGNIFICANT CHANGE UP (ref 10–40)
BILIRUB SERPL-MCNC: 1.3 MG/DL — HIGH (ref 0.2–1.2)
BUN SERPL-MCNC: 15 MG/DL — SIGNIFICANT CHANGE UP (ref 7–23)
CALCIUM SERPL-MCNC: 8.8 MG/DL — SIGNIFICANT CHANGE UP (ref 8.4–10.5)
CHLORIDE SERPL-SCNC: 102 MMOL/L — SIGNIFICANT CHANGE UP (ref 96–108)
CHOLEST SERPL-MCNC: 154 MG/DL — SIGNIFICANT CHANGE UP
CO2 SERPL-SCNC: 25 MMOL/L — SIGNIFICANT CHANGE UP (ref 22–31)
CREAT SERPL-MCNC: 0.88 MG/DL — SIGNIFICANT CHANGE UP (ref 0.5–1.3)
EGFR: 96 ML/MIN/1.73M2 — SIGNIFICANT CHANGE UP
ESTIMATED AVERAGE GLUCOSE: 174 MG/DL — HIGH (ref 68–114)
GLUCOSE BLDC GLUCOMTR-MCNC: 160 MG/DL — HIGH (ref 70–99)
GLUCOSE BLDC GLUCOMTR-MCNC: 172 MG/DL — HIGH (ref 70–99)
GLUCOSE BLDC GLUCOMTR-MCNC: 179 MG/DL — HIGH (ref 70–99)
GLUCOSE BLDC GLUCOMTR-MCNC: 190 MG/DL — HIGH (ref 70–99)
GLUCOSE SERPL-MCNC: 181 MG/DL — HIGH (ref 70–99)
HCT VFR BLD CALC: 39.5 % — SIGNIFICANT CHANGE UP (ref 39–50)
HCV AB S/CO SERPL IA: 0.17 S/CO — SIGNIFICANT CHANGE UP (ref 0–0.99)
HCV AB SERPL-IMP: SIGNIFICANT CHANGE UP
HDLC SERPL-MCNC: 42 MG/DL — SIGNIFICANT CHANGE UP
HGB BLD-MCNC: 13.5 G/DL — SIGNIFICANT CHANGE UP (ref 13–17)
INR BLD: 1.12 RATIO — SIGNIFICANT CHANGE UP (ref 0.85–1.18)
LIPID PNL WITH DIRECT LDL SERPL: 95 MG/DL — SIGNIFICANT CHANGE UP
MAGNESIUM SERPL-MCNC: 1.3 MG/DL — LOW (ref 1.6–2.6)
MCHC RBC-ENTMCNC: 30.3 PG — SIGNIFICANT CHANGE UP (ref 27–34)
MCHC RBC-ENTMCNC: 34.2 GM/DL — SIGNIFICANT CHANGE UP (ref 32–36)
MCV RBC AUTO: 88.6 FL — SIGNIFICANT CHANGE UP (ref 80–100)
NON HDL CHOLESTEROL: 112 MG/DL — SIGNIFICANT CHANGE UP
NRBC # BLD: 0 /100 WBCS — SIGNIFICANT CHANGE UP (ref 0–0)
PHOSPHATE SERPL-MCNC: 3.4 MG/DL — SIGNIFICANT CHANGE UP (ref 2.5–4.5)
PLATELET # BLD AUTO: 196 K/UL — SIGNIFICANT CHANGE UP (ref 150–400)
POTASSIUM SERPL-MCNC: 4.3 MMOL/L — SIGNIFICANT CHANGE UP (ref 3.5–5.3)
POTASSIUM SERPL-SCNC: 4.3 MMOL/L — SIGNIFICANT CHANGE UP (ref 3.5–5.3)
PROT SERPL-MCNC: 6.9 G/DL — SIGNIFICANT CHANGE UP (ref 6–8.3)
PROTHROM AB SERPL-ACNC: 12.7 SEC — SIGNIFICANT CHANGE UP (ref 9.5–13)
RBC # BLD: 4.46 M/UL — SIGNIFICANT CHANGE UP (ref 4.2–5.8)
RBC # FLD: 13.2 % — SIGNIFICANT CHANGE UP (ref 10.3–14.5)
SODIUM SERPL-SCNC: 138 MMOL/L — SIGNIFICANT CHANGE UP (ref 135–145)
TRIGL SERPL-MCNC: 92 MG/DL — SIGNIFICANT CHANGE UP
TSH SERPL-MCNC: 2.35 UIU/ML — SIGNIFICANT CHANGE UP (ref 0.36–3.74)
WBC # BLD: 10.29 K/UL — SIGNIFICANT CHANGE UP (ref 3.8–10.5)
WBC # FLD AUTO: 10.29 K/UL — SIGNIFICANT CHANGE UP (ref 3.8–10.5)

## 2024-03-21 PROCEDURE — 93306 TTE W/DOPPLER COMPLETE: CPT | Mod: 26

## 2024-03-21 PROCEDURE — 71045 X-RAY EXAM CHEST 1 VIEW: CPT | Mod: 26

## 2024-03-21 PROCEDURE — 73551 X-RAY EXAM OF FEMUR 1: CPT | Mod: 26,LT

## 2024-03-21 PROCEDURE — 99233 SBSQ HOSP IP/OBS HIGH 50: CPT | Mod: GC

## 2024-03-21 PROCEDURE — 73502 X-RAY EXAM HIP UNI 2-3 VIEWS: CPT | Mod: 26,LT

## 2024-03-21 RX ORDER — MAGNESIUM SULFATE 500 MG/ML
2 VIAL (ML) INJECTION
Refills: 0 | Status: COMPLETED | OUTPATIENT
Start: 2024-03-21 | End: 2024-03-21

## 2024-03-21 RX ADMIN — PANTOPRAZOLE SODIUM 40 MILLIGRAM(S): 20 TABLET, DELAYED RELEASE ORAL at 06:26

## 2024-03-21 RX ADMIN — HYDROMORPHONE HYDROCHLORIDE 2 MILLIGRAM(S): 2 INJECTION INTRAMUSCULAR; INTRAVENOUS; SUBCUTANEOUS at 00:14

## 2024-03-21 RX ADMIN — HYDROMORPHONE HYDROCHLORIDE 2 MILLIGRAM(S): 2 INJECTION INTRAMUSCULAR; INTRAVENOUS; SUBCUTANEOUS at 06:47

## 2024-03-21 RX ADMIN — SODIUM CHLORIDE 100 MILLILITER(S): 9 INJECTION INTRAMUSCULAR; INTRAVENOUS; SUBCUTANEOUS at 08:13

## 2024-03-21 RX ADMIN — HYDROMORPHONE HYDROCHLORIDE 2 MILLIGRAM(S): 2 INJECTION INTRAMUSCULAR; INTRAVENOUS; SUBCUTANEOUS at 21:25

## 2024-03-21 RX ADMIN — Medication 1: at 17:03

## 2024-03-21 RX ADMIN — HYDROMORPHONE HYDROCHLORIDE 1 MILLIGRAM(S): 2 INJECTION INTRAMUSCULAR; INTRAVENOUS; SUBCUTANEOUS at 15:44

## 2024-03-21 RX ADMIN — HYDROMORPHONE HYDROCHLORIDE 2 MILLIGRAM(S): 2 INJECTION INTRAMUSCULAR; INTRAVENOUS; SUBCUTANEOUS at 20:55

## 2024-03-21 RX ADMIN — ONDANSETRON 4 MILLIGRAM(S): 8 TABLET, FILM COATED ORAL at 08:13

## 2024-03-21 RX ADMIN — Medication 25 GRAM(S): at 12:59

## 2024-03-21 RX ADMIN — Medication 1: at 13:04

## 2024-03-21 RX ADMIN — SENNA PLUS 2 TABLET(S): 8.6 TABLET ORAL at 21:08

## 2024-03-21 RX ADMIN — HYDROMORPHONE HYDROCHLORIDE 2 MILLIGRAM(S): 2 INJECTION INTRAMUSCULAR; INTRAVENOUS; SUBCUTANEOUS at 12:41

## 2024-03-21 RX ADMIN — HYDROMORPHONE HYDROCHLORIDE 2 MILLIGRAM(S): 2 INJECTION INTRAMUSCULAR; INTRAVENOUS; SUBCUTANEOUS at 00:29

## 2024-03-21 RX ADMIN — HYDROMORPHONE HYDROCHLORIDE 1 MILLIGRAM(S): 2 INJECTION INTRAMUSCULAR; INTRAVENOUS; SUBCUTANEOUS at 14:44

## 2024-03-21 RX ADMIN — Medication 10 MILLIGRAM(S): at 06:26

## 2024-03-21 RX ADMIN — Medication 1: at 08:05

## 2024-03-21 RX ADMIN — HYDROMORPHONE HYDROCHLORIDE 2 MILLIGRAM(S): 2 INJECTION INTRAMUSCULAR; INTRAVENOUS; SUBCUTANEOUS at 06:26

## 2024-03-21 RX ADMIN — HYDROMORPHONE HYDROCHLORIDE 2 MILLIGRAM(S): 2 INJECTION INTRAMUSCULAR; INTRAVENOUS; SUBCUTANEOUS at 13:41

## 2024-03-21 RX ADMIN — Medication 25 GRAM(S): at 16:19

## 2024-03-21 NOTE — CONSULT NOTE ADULT - RESPIRATORY AND THORAX
Hide Biopsy Depth?: No Post-Care Instructions: I reviewed with the patient in detail post-care instructions. Patient is to keep the biopsy site dry overnight, and then apply bacitracin twice daily until healed. Patient may apply hydrogen peroxide soaks to remove any crusting. Electrodesiccation And Curettage Text: The wound bed was treated with electrodesiccation and curettage after the biopsy was performed. Anesthesia Type: 1% lidocaine with epinephrine Curettage Text: The wound bed was treated with curettage after the biopsy was performed. Additional Anesthesia Volume In Cc (Will Not Render If 0): 0 Biopsy Method: Dermablade Wound Care: Petrolatum Biopsy Type: H and E Validate Note Data (See Information Below): Yes Hemostasis: Electrocautery Electrodesiccation Text: The wound bed was treated with electrodesiccation after the biopsy was performed. negative Type Of Destruction Used: Curettage Cryotherapy Text: The wound bed was treated with cryotherapy after the biopsy was performed. Dressing: Band-Aid Information: Selecting Yes will display possible errors in your note based on the variables you have selected. This validation is only offered as a suggestion for you. PLEASE NOTE THAT THE VALIDATION TEXT WILL BE REMOVED WHEN YOU FINALIZE YOUR NOTE. IF YOU WANT TO FAX A PRELIMINARY NOTE YOU WILL NEED TO TOGGLE THIS TO 'NO' IF YOU DO NOT WANT IT IN YOUR FAXED NOTE. Anesthesia Volume In Cc: 0.5 Depth Of Biopsy: dermis Billing Type: Third-Party Bill Detail Level: Detailed Consent: Written consent was obtained and risks were reviewed including but not limited to scarring, infection, bleeding, scabbing, incomplete removal, nerve damage and allergy to anesthesia. Notification Instructions: Patient will be notified of biopsy results. However, patient instructed to call the office if not contacted within 2 weeks. Silver Nitrate Text: The wound bed was treated with silver nitrate after the biopsy was performed.
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
## 2024-03-21 NOTE — PROGRESS NOTE ADULT - SUBJECTIVE AND OBJECTIVE BOX
Patient is a 63y old  Male who presents with a chief complaint of mechanical fall (20 Mar 2024 23:27)      INTERVAL HPI/OVERNIGHT EVENTS: Patient seen and examined at bedside. No overnight events.    MEDICATIONS  (STANDING):  dextrose 5%. 1000 milliLiter(s) (50 mL/Hr) IV Continuous <Continuous>  dextrose 5%. 1000 milliLiter(s) (100 mL/Hr) IV Continuous <Continuous>  dextrose 50% Injectable 25 Gram(s) IV Push once  dextrose 50% Injectable 12.5 Gram(s) IV Push once  dextrose 50% Injectable 25 Gram(s) IV Push once  enalapril 10 milliGRAM(s) Oral daily  glucagon  Injectable 1 milliGRAM(s) IntraMuscular once  insulin lispro (ADMELOG) corrective regimen sliding scale   SubCutaneous three times a day before meals  insulin lispro (ADMELOG) corrective regimen sliding scale   SubCutaneous at bedtime  magnesium sulfate  IVPB 2 Gram(s) IV Intermittent every 2 hours  pantoprazole    Tablet 40 milliGRAM(s) Oral before breakfast  polyethylene glycol 3350 17 Gram(s) Oral daily  senna 2 Tablet(s) Oral at bedtime  sodium chloride 0.9%. 1000 milliLiter(s) (100 mL/Hr) IV Continuous <Continuous>    MEDICATIONS  (PRN):  acetaminophen     Tablet .. 650 milliGRAM(s) Oral every 6 hours PRN Temp greater or equal to 38C (100.4F), Mild Pain (1 - 3)  aluminum hydroxide/magnesium hydroxide/simethicone Suspension 30 milliLiter(s) Oral every 4 hours PRN Dyspepsia  dextrose Oral Gel 15 Gram(s) Oral once PRN Blood Glucose LESS THAN 70 milliGRAM(s)/deciliter  HYDROmorphone  Injectable 2 milliGRAM(s) IV Push every 4 hours PRN Severe Pain (7 - 10)  HYDROmorphone  Injectable 1 milliGRAM(s) IV Push every 4 hours PRN Moderate Pain (4 - 6)  melatonin 3 milliGRAM(s) Oral at bedtime PRN Insomnia  ondansetron Injectable 4 milliGRAM(s) IV Push every 8 hours PRN Nausea and/or Vomiting      Allergies    No Known Allergies    Intolerances        REVIEW OF SYSTEMS:  CONSTITUTIONAL: No fever or chills  HEENT:  No headache, no sore throat  RESPIRATORY: No cough, wheezing, or shortness of breath  CARDIOVASCULAR: No chest pain, palpitations  GASTROINTESTINAL: No abd pain, nausea, vomiting, or diarrhea  GENITOURINARY: No dysuria, frequency, or hematuria  NEUROLOGICAL: no focal weakness or dizziness  MUSCULOSKELETAL: no myalgias     Vital Signs Last 24 Hrs  T(C): 36.7 (21 Mar 2024 05:30), Max: 37.6 (21 Mar 2024 00:13)  T(F): 98.1 (21 Mar 2024 05:30), Max: 99.6 (21 Mar 2024 00:13)  HR: 77 (21 Mar 2024 05:30) (77 - 101)  BP: 133/79 (21 Mar 2024 05:30) (98/58 - 164/92)  BP(mean): --  RR: 17 (21 Mar 2024 05:30) (16 - 18)  SpO2: 97% (21 Mar 2024 05:30) (96% - 99%)    Parameters below as of 21 Mar 2024 05:30  Patient On (Oxygen Delivery Method): room air      I&O's Summary    20 Mar 2024 07:01  -  21 Mar 2024 07:00  --------------------------------------------------------  IN: 0 mL / OUT: 275 mL / NET: -275 mL    21 Mar 2024 07:01  -  21 Mar 2024 11:31  --------------------------------------------------------  IN: 300 mL / OUT: 0 mL / NET: 300 mL      BMI (kg/m2): 28.6 (03-20-24 @ 16:52)    PHYSICAL EXAM:  GENERAL: NAD  HEENT:  AT/NC, anicteric, moist mucous membranes, EOMI, PERRL, no lid-lag, conjunctiva and sclera clear  CHEST/LUNG:  CTA b/l, no rales, wheezes, or rhonchi,  normal respiratory effort, no intercostal retractions  HEART:  RRR, S1, S2, no murmurs; no pitting edema  ABDOMEN:  BS+, soft, nontender, nondistended; No HSM  MSK/EXTREMITIES: 2+ peripheral pulses, no clubbing or cyanosis  NERVOUS SYSTEM: answers questions and follows commands appropriately, A&Ox3 grossly moves all extremities   PSYCH: Appropriate affect, Alert & Awake; Good judgement      LABS: Personally reviewed  CBC                        13.5   10.29 )-----------( 196      ( 21 Mar 2024 06:01 )             39.5     CMP  03-21    138  |  102  |  15  ----------------------------<  181  4.3   |  25  |  0.88    Ca    8.8      21 Mar 2024 06:01  Phos  3.4     03-21  Mg     1.3     03-21    TPro  6.9  /  Alb  3.3  /  TBili  1.3  /  DBili  x   /  AST  18  /  ALT  21  /  AlkPhos  80  03-21          PT/INR - ( 21 Mar 2024 06:01 )   PT: 12.7 sec;   INR: 1.12 ratio         PTT - ( 21 Mar 2024 06:01 )  PTT:31.2 sec          03-21 Chol 154 mg/dL LDL -- HDL 42 mg/dL Trig 92 mg/dL  TSH 2.349   TSH with FT4 reflex --  Total T3 --              POCT Blood Glucose.: 160 mg/dL (21 Mar 2024 08:00)  POCT Blood Glucose.: 173 mg/dL (20 Mar 2024 23:44)      Urinalysis Basic - ( 21 Mar 2024 06:01 )  Color: x / Appearance: x / SG: x / pH: x  Gluc: 181 mg/dL / Ketone: x  / Bili: x / Urobili: x   Blood: x / Protein: x / Nitrite: x   Leuk Esterase: x / RBC: x / WBC x   Sq Epi: x / Non Sq Epi: x / Bacteria: x                RADIOLOGY & ADDITIONAL TESTS: Personally reviewed.     Consultant(s) Notes Reviewed:  [x] YES  [ ] NO   Discussed with GIOVANNI/GRACE, RN
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
## 2024-03-21 NOTE — CONSULT NOTE ADULT - PROBLEM SELECTOR RECOMMENDATION 9
NPO/Ivf  Medically optimize for surgery   Possible OR tonight versus tomorrow  stat hip/femoral xray   discussed with surgeon

## 2024-03-22 DIAGNOSIS — S72.142A DISPLACED INTERTROCHANTERIC FRACTURE OF LEFT FEMUR, INITIAL ENCOUNTER FOR CLOSED FRACTURE: ICD-10-CM

## 2024-03-22 LAB
ALBUMIN SERPL ELPH-MCNC: 3.5 G/DL — SIGNIFICANT CHANGE UP (ref 3.3–5)
ALP SERPL-CCNC: 84 U/L — SIGNIFICANT CHANGE UP (ref 40–120)
ALT FLD-CCNC: 17 U/L — SIGNIFICANT CHANGE UP (ref 10–45)
ANION GAP SERPL CALC-SCNC: 8 MMOL/L — SIGNIFICANT CHANGE UP (ref 5–17)
AST SERPL-CCNC: 16 U/L — SIGNIFICANT CHANGE UP (ref 10–40)
BILIRUB SERPL-MCNC: 1.6 MG/DL — HIGH (ref 0.2–1.2)
BUN SERPL-MCNC: 21 MG/DL — SIGNIFICANT CHANGE UP (ref 7–23)
CALCIUM SERPL-MCNC: 9.2 MG/DL — SIGNIFICANT CHANGE UP (ref 8.4–10.5)
CHLORIDE SERPL-SCNC: 103 MMOL/L — SIGNIFICANT CHANGE UP (ref 96–108)
CO2 SERPL-SCNC: 28 MMOL/L — SIGNIFICANT CHANGE UP (ref 22–31)
CREAT SERPL-MCNC: 0.96 MG/DL — SIGNIFICANT CHANGE UP (ref 0.5–1.3)
EGFR: 88 ML/MIN/1.73M2 — SIGNIFICANT CHANGE UP
GLUCOSE BLDC GLUCOMTR-MCNC: 155 MG/DL — HIGH (ref 70–99)
GLUCOSE BLDC GLUCOMTR-MCNC: 157 MG/DL — HIGH (ref 70–99)
GLUCOSE BLDC GLUCOMTR-MCNC: 177 MG/DL — HIGH (ref 70–99)
GLUCOSE BLDC GLUCOMTR-MCNC: 243 MG/DL — HIGH (ref 70–99)
GLUCOSE SERPL-MCNC: 174 MG/DL — HIGH (ref 70–99)
HCT VFR BLD CALC: 38.7 % — LOW (ref 39–50)
HGB BLD-MCNC: 13.1 G/DL — SIGNIFICANT CHANGE UP (ref 13–17)
MAGNESIUM SERPL-MCNC: 2.3 MG/DL — SIGNIFICANT CHANGE UP (ref 1.6–2.6)
MCHC RBC-ENTMCNC: 30.6 PG — SIGNIFICANT CHANGE UP (ref 27–34)
MCHC RBC-ENTMCNC: 33.9 GM/DL — SIGNIFICANT CHANGE UP (ref 32–36)
MCV RBC AUTO: 90.4 FL — SIGNIFICANT CHANGE UP (ref 80–100)
NRBC # BLD: 0 /100 WBCS — SIGNIFICANT CHANGE UP (ref 0–0)
PLATELET # BLD AUTO: 190 K/UL — SIGNIFICANT CHANGE UP (ref 150–400)
POTASSIUM SERPL-MCNC: 4.2 MMOL/L — SIGNIFICANT CHANGE UP (ref 3.5–5.3)
POTASSIUM SERPL-SCNC: 4.2 MMOL/L — SIGNIFICANT CHANGE UP (ref 3.5–5.3)
PROT SERPL-MCNC: 7.3 G/DL — SIGNIFICANT CHANGE UP (ref 6–8.3)
RBC # BLD: 4.28 M/UL — SIGNIFICANT CHANGE UP (ref 4.2–5.8)
RBC # FLD: 13.2 % — SIGNIFICANT CHANGE UP (ref 10.3–14.5)
SODIUM SERPL-SCNC: 139 MMOL/L — SIGNIFICANT CHANGE UP (ref 135–145)
WBC # BLD: 12.54 K/UL — HIGH (ref 3.8–10.5)
WBC # FLD AUTO: 12.54 K/UL — HIGH (ref 3.8–10.5)

## 2024-03-22 PROCEDURE — 99233 SBSQ HOSP IP/OBS HIGH 50: CPT | Mod: GC

## 2024-03-22 DEVICE — NAIL CANN TI 130DEG 11X170MM: Type: IMPLANTABLE DEVICE | Site: LEFT | Status: FUNCTIONAL

## 2024-03-22 DEVICE — SCREW TFNA FEN 100MM STRL: Type: IMPLANTABLE DEVICE | Site: LEFT | Status: FUNCTIONAL

## 2024-03-22 DEVICE — GUIDEWIRE BALL TIP 3MM X 1000MM FOR T2 R1.5 FEMORAL NAILING SYSTEM: Type: IMPLANTABLE DEVICE | Site: LEFT | Status: FUNCTIONAL

## 2024-03-22 DEVICE — SCREW LOKG 5X36MM: Type: IMPLANTABLE DEVICE | Site: LEFT | Status: FUNCTIONAL

## 2024-03-22 RX ORDER — POLYETHYLENE GLYCOL 3350 17 G/17G
17 POWDER, FOR SOLUTION ORAL DAILY
Refills: 0 | Status: DISCONTINUED | OUTPATIENT
Start: 2024-03-22 | End: 2024-03-25

## 2024-03-22 RX ORDER — LANOLIN ALCOHOL/MO/W.PET/CERES
3 CREAM (GRAM) TOPICAL AT BEDTIME
Refills: 0 | Status: DISCONTINUED | OUTPATIENT
Start: 2024-03-22 | End: 2024-03-25

## 2024-03-22 RX ORDER — HYDROMORPHONE HYDROCHLORIDE 2 MG/ML
1 INJECTION INTRAMUSCULAR; INTRAVENOUS; SUBCUTANEOUS EVERY 4 HOURS
Refills: 0 | Status: DISCONTINUED | OUTPATIENT
Start: 2024-03-22 | End: 2024-03-24

## 2024-03-22 RX ORDER — DEXTROSE 50 % IN WATER 50 %
12.5 SYRINGE (ML) INTRAVENOUS ONCE
Refills: 0 | Status: DISCONTINUED | OUTPATIENT
Start: 2024-03-22 | End: 2024-03-25

## 2024-03-22 RX ORDER — INSULIN LISPRO 100/ML
VIAL (ML) SUBCUTANEOUS AT BEDTIME
Refills: 0 | Status: DISCONTINUED | OUTPATIENT
Start: 2024-03-22 | End: 2024-03-25

## 2024-03-22 RX ORDER — SODIUM CHLORIDE 9 MG/ML
1000 INJECTION, SOLUTION INTRAVENOUS
Refills: 0 | Status: DISCONTINUED | OUTPATIENT
Start: 2024-03-22 | End: 2024-03-25

## 2024-03-22 RX ORDER — DEXTROSE 50 % IN WATER 50 %
25 SYRINGE (ML) INTRAVENOUS ONCE
Refills: 0 | Status: DISCONTINUED | OUTPATIENT
Start: 2024-03-22 | End: 2024-03-25

## 2024-03-22 RX ORDER — ONDANSETRON 8 MG/1
4 TABLET, FILM COATED ORAL EVERY 6 HOURS
Refills: 0 | Status: DISCONTINUED | OUTPATIENT
Start: 2024-03-22 | End: 2024-03-25

## 2024-03-22 RX ORDER — ACETAMINOPHEN 500 MG
650 TABLET ORAL EVERY 6 HOURS
Refills: 0 | Status: DISCONTINUED | OUTPATIENT
Start: 2024-03-22 | End: 2024-03-24

## 2024-03-22 RX ORDER — HYDROMORPHONE HYDROCHLORIDE 2 MG/ML
0.5 INJECTION INTRAMUSCULAR; INTRAVENOUS; SUBCUTANEOUS
Refills: 0 | Status: DISCONTINUED | OUTPATIENT
Start: 2024-03-22 | End: 2024-03-22

## 2024-03-22 RX ORDER — INSULIN LISPRO 100/ML
VIAL (ML) SUBCUTANEOUS
Refills: 0 | Status: DISCONTINUED | OUTPATIENT
Start: 2024-03-22 | End: 2024-03-25

## 2024-03-22 RX ORDER — PANTOPRAZOLE SODIUM 20 MG/1
40 TABLET, DELAYED RELEASE ORAL
Refills: 0 | Status: DISCONTINUED | OUTPATIENT
Start: 2024-03-23 | End: 2024-03-25

## 2024-03-22 RX ORDER — GLUCAGON INJECTION, SOLUTION 0.5 MG/.1ML
1 INJECTION, SOLUTION SUBCUTANEOUS ONCE
Refills: 0 | Status: DISCONTINUED | OUTPATIENT
Start: 2024-03-22 | End: 2024-03-25

## 2024-03-22 RX ORDER — CEFAZOLIN SODIUM 1 G
2000 VIAL (EA) INJECTION EVERY 8 HOURS
Refills: 0 | Status: COMPLETED | OUTPATIENT
Start: 2024-03-23 | End: 2024-03-23

## 2024-03-22 RX ORDER — HYDROMORPHONE HYDROCHLORIDE 2 MG/ML
0.5 INJECTION INTRAMUSCULAR; INTRAVENOUS; SUBCUTANEOUS EVERY 4 HOURS
Refills: 0 | Status: DISCONTINUED | OUTPATIENT
Start: 2024-03-22 | End: 2024-03-24

## 2024-03-22 RX ORDER — SENNA PLUS 8.6 MG/1
2 TABLET ORAL AT BEDTIME
Refills: 0 | Status: DISCONTINUED | OUTPATIENT
Start: 2024-03-22 | End: 2024-03-25

## 2024-03-22 RX ORDER — SODIUM CHLORIDE 9 MG/ML
1000 INJECTION, SOLUTION INTRAVENOUS
Refills: 0 | Status: DISCONTINUED | OUTPATIENT
Start: 2024-03-22 | End: 2024-03-22

## 2024-03-22 RX ORDER — DEXTROSE 50 % IN WATER 50 %
15 SYRINGE (ML) INTRAVENOUS ONCE
Refills: 0 | Status: DISCONTINUED | OUTPATIENT
Start: 2024-03-22 | End: 2024-03-25

## 2024-03-22 RX ORDER — ONDANSETRON 8 MG/1
4 TABLET, FILM COATED ORAL ONCE
Refills: 0 | Status: DISCONTINUED | OUTPATIENT
Start: 2024-03-22 | End: 2024-03-22

## 2024-03-22 RX ADMIN — HYDROMORPHONE HYDROCHLORIDE 2 MILLIGRAM(S): 2 INJECTION INTRAMUSCULAR; INTRAVENOUS; SUBCUTANEOUS at 13:00

## 2024-03-22 RX ADMIN — HYDROMORPHONE HYDROCHLORIDE 2 MILLIGRAM(S): 2 INJECTION INTRAMUSCULAR; INTRAVENOUS; SUBCUTANEOUS at 05:32

## 2024-03-22 RX ADMIN — Medication 200 MILLIGRAM(S): at 19:55

## 2024-03-22 RX ADMIN — Medication 10 MILLIGRAM(S): at 05:32

## 2024-03-22 RX ADMIN — Medication 650 MILLIGRAM(S): at 21:39

## 2024-03-22 RX ADMIN — HYDROMORPHONE HYDROCHLORIDE 0.5 MILLIGRAM(S): 2 INJECTION INTRAMUSCULAR; INTRAVENOUS; SUBCUTANEOUS at 20:37

## 2024-03-22 RX ADMIN — HYDROMORPHONE HYDROCHLORIDE 0.5 MILLIGRAM(S): 2 INJECTION INTRAMUSCULAR; INTRAVENOUS; SUBCUTANEOUS at 20:04

## 2024-03-22 RX ADMIN — Medication 650 MILLIGRAM(S): at 23:00

## 2024-03-22 RX ADMIN — HYDROMORPHONE HYDROCHLORIDE 0.5 MILLIGRAM(S): 2 INJECTION INTRAMUSCULAR; INTRAVENOUS; SUBCUTANEOUS at 20:14

## 2024-03-22 RX ADMIN — SENNA PLUS 2 TABLET(S): 8.6 TABLET ORAL at 23:23

## 2024-03-22 RX ADMIN — HYDROMORPHONE HYDROCHLORIDE 2 MILLIGRAM(S): 2 INJECTION INTRAMUSCULAR; INTRAVENOUS; SUBCUTANEOUS at 06:32

## 2024-03-22 RX ADMIN — HYDROMORPHONE HYDROCHLORIDE 2 MILLIGRAM(S): 2 INJECTION INTRAMUSCULAR; INTRAVENOUS; SUBCUTANEOUS at 12:15

## 2024-03-22 RX ADMIN — HYDROMORPHONE HYDROCHLORIDE 0.5 MILLIGRAM(S): 2 INJECTION INTRAMUSCULAR; INTRAVENOUS; SUBCUTANEOUS at 20:21

## 2024-03-22 NOTE — PROGRESS NOTE ADULT - SUBJECTIVE AND OBJECTIVE BOX
Patient is a 63y old  Male who presents with a chief complaint of mechanical fall (20 Mar 2024 23:27)      INTERVAL HPI/OVERNIGHT EVENTS: Patient seen and examined at bedside. No overnight events.    MEDICATIONS  (STANDING):  dextrose 5%. 1000 milliLiter(s) (50 mL/Hr) IV Continuous <Continuous>  dextrose 5%. 1000 milliLiter(s) (100 mL/Hr) IV Continuous <Continuous>  dextrose 50% Injectable 25 Gram(s) IV Push once  dextrose 50% Injectable 12.5 Gram(s) IV Push once  dextrose 50% Injectable 25 Gram(s) IV Push once  enalapril 10 milliGRAM(s) Oral daily  glucagon  Injectable 1 milliGRAM(s) IntraMuscular once  insulin lispro (ADMELOG) corrective regimen sliding scale   SubCutaneous three times a day before meals  insulin lispro (ADMELOG) corrective regimen sliding scale   SubCutaneous at bedtime  magnesium sulfate  IVPB 2 Gram(s) IV Intermittent every 2 hours  pantoprazole    Tablet 40 milliGRAM(s) Oral before breakfast  polyethylene glycol 3350 17 Gram(s) Oral daily  senna 2 Tablet(s) Oral at bedtime  sodium chloride 0.9%. 1000 milliLiter(s) (100 mL/Hr) IV Continuous <Continuous>    MEDICATIONS  (PRN):  acetaminophen     Tablet .. 650 milliGRAM(s) Oral every 6 hours PRN Temp greater or equal to 38C (100.4F), Mild Pain (1 - 3)  aluminum hydroxide/magnesium hydroxide/simethicone Suspension 30 milliLiter(s) Oral every 4 hours PRN Dyspepsia  dextrose Oral Gel 15 Gram(s) Oral once PRN Blood Glucose LESS THAN 70 milliGRAM(s)/deciliter  HYDROmorphone  Injectable 2 milliGRAM(s) IV Push every 4 hours PRN Severe Pain (7 - 10)  HYDROmorphone  Injectable 1 milliGRAM(s) IV Push every 4 hours PRN Moderate Pain (4 - 6)  melatonin 3 milliGRAM(s) Oral at bedtime PRN Insomnia  ondansetron Injectable 4 milliGRAM(s) IV Push every 8 hours PRN Nausea and/or Vomiting      Allergies    No Known Allergies    Intolerances        REVIEW OF SYSTEMS:  CONSTITUTIONAL: No fever or chills  HEENT:  No headache, no sore throat  RESPIRATORY: No cough, wheezing, or shortness of breath  CARDIOVASCULAR: No chest pain, palpitations  GASTROINTESTINAL: No abd pain, nausea, vomiting, or diarrhea  GENITOURINARY: No dysuria, frequency, or hematuria  NEUROLOGICAL: no focal weakness or dizziness  MUSCULOSKELETAL: no myalgias       Vital Signs Last 24 Hrs  T(C): 37.2 (22 Mar 2024 05:30), Max: 37.2 (22 Mar 2024 05:30)  T(F): 98.9 (22 Mar 2024 05:30), Max: 98.9 (22 Mar 2024 05:30)  HR: 83 (22 Mar 2024 05:30) (75 - 92)  BP: 175/89 (22 Mar 2024 05:30) (148/81 - 175/89)  BP(mean): --  RR: 18 (22 Mar 2024 05:30) (18 - 18)  SpO2: 98% (22 Mar 2024 05:30) (96% - 98%)  Parameters below as of 22 Mar 2024 05:30  Patient On (Oxygen Delivery Method): room air        PHYSICAL EXAM:  GENERAL: NAD  HEENT:  AT/NC, anicteric, moist mucous membranes, EOMI, no lid-lag, conjunctiva and sclera clear  CHEST/LUNG:  CTA b/l, no rales, wheezes, or rhonchi,  normal respiratory effort, no intercostal retractions  HEART:  RRR, S1, S2, no murmurs; no pitting edema  ABDOMEN:  BS+, soft, nontender, nondistended; No HSM  MSK/EXTREMITIES: 2+ peripheral pulses, no clubbing or cyanosis  NERVOUS SYSTEM: answers questions and follows commands appropriately, A&Ox3 grossly moves all extremities   PSYCH: Appropriate affect, Alert & Awake; Good judgement      LABS: Personally reviewed  CBC                          13.1   12.54 )-----------( 190      ( 22 Mar 2024 07:39 )             38.7       03-21  138  |  102  |  15  ----------------------------<  181<H>  4.3   |  25  |  0.88    Ca    8.8      21 Mar 2024 06:01  Phos  3.4     03-21  Mg     1.3     03-21  TPro  6.9  /  Alb  3.3  /  TBili  1.3<H>  /  DBili  x   /  AST  18  /  ALT  21  /  AlkPhos  80  03-21              Urinalysis Basic - ( 21 Mar 2024 06:01 )  Color: x / Appearance: x / SG: x / pH: x  Gluc: 181 mg/dL / Ketone: x  / Bili: x / Urobili: x   Blood: x / Protein: x / Nitrite: x   Leuk Esterase: x / RBC: x / WBC x   Sq Epi: x / Non Sq Epi: x / Bacteria: x      PT/INR - ( 21 Mar 2024 06:01 )   PT: 12.7 sec;   INR: 1.12 ratio    PTT - ( 21 Mar 2024 06:01 )  PTT:31.2 sec      CAPILLARY BLOOD GLUCOSE  POCT Blood Glucose.: 155 mg/dL (22 Mar 2024 05:50)                  RADIOLOGY & ADDITIONAL TESTS: Personally reviewed.     Consultant(s) Notes Reviewed:  [x] YES  [ ] NO   Discussed with GIOVANNI/GRACE, RN
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
## 2024-03-22 NOTE — BRIEF OPERATIVE NOTE - NSICDXBRIEFPROCEDURE_GEN_ALL_CORE_FT
PROCEDURES:  Open reduction and internal fixation of intertrochanteric fracture of left hip 22-Mar-2024 19:34:46 Angelica Fraser

## 2024-03-23 LAB
ALBUMIN SERPL ELPH-MCNC: 3 G/DL — LOW (ref 3.3–5)
ALP SERPL-CCNC: 77 U/L — SIGNIFICANT CHANGE UP (ref 40–120)
ALT FLD-CCNC: 14 U/L — SIGNIFICANT CHANGE UP (ref 10–45)
ANION GAP SERPL CALC-SCNC: 9 MMOL/L — SIGNIFICANT CHANGE UP (ref 5–17)
AST SERPL-CCNC: 18 U/L — SIGNIFICANT CHANGE UP (ref 10–40)
BILIRUB SERPL-MCNC: 1.2 MG/DL — SIGNIFICANT CHANGE UP (ref 0.2–1.2)
BUN SERPL-MCNC: 16 MG/DL — SIGNIFICANT CHANGE UP (ref 7–23)
CALCIUM SERPL-MCNC: 8.5 MG/DL — SIGNIFICANT CHANGE UP (ref 8.4–10.5)
CHLORIDE SERPL-SCNC: 104 MMOL/L — SIGNIFICANT CHANGE UP (ref 96–108)
CO2 SERPL-SCNC: 25 MMOL/L — SIGNIFICANT CHANGE UP (ref 22–31)
CREAT SERPL-MCNC: 0.95 MG/DL — SIGNIFICANT CHANGE UP (ref 0.5–1.3)
EGFR: 89 ML/MIN/1.73M2 — SIGNIFICANT CHANGE UP
GLUCOSE BLDC GLUCOMTR-MCNC: 145 MG/DL — HIGH (ref 70–99)
GLUCOSE BLDC GLUCOMTR-MCNC: 185 MG/DL — HIGH (ref 70–99)
GLUCOSE BLDC GLUCOMTR-MCNC: 192 MG/DL — HIGH (ref 70–99)
GLUCOSE BLDC GLUCOMTR-MCNC: 231 MG/DL — HIGH (ref 70–99)
GLUCOSE SERPL-MCNC: 156 MG/DL — HIGH (ref 70–99)
HCT VFR BLD CALC: 33.3 % — LOW (ref 39–50)
HGB BLD-MCNC: 11.3 G/DL — LOW (ref 13–17)
MAGNESIUM SERPL-MCNC: 1.9 MG/DL — SIGNIFICANT CHANGE UP (ref 1.6–2.6)
MCHC RBC-ENTMCNC: 30.2 PG — SIGNIFICANT CHANGE UP (ref 27–34)
MCHC RBC-ENTMCNC: 33.9 GM/DL — SIGNIFICANT CHANGE UP (ref 32–36)
MCV RBC AUTO: 89 FL — SIGNIFICANT CHANGE UP (ref 80–100)
NRBC # BLD: 0 /100 WBCS — SIGNIFICANT CHANGE UP (ref 0–0)
PHOSPHATE SERPL-MCNC: 2.8 MG/DL — SIGNIFICANT CHANGE UP (ref 2.5–4.5)
PLATELET # BLD AUTO: 202 K/UL — SIGNIFICANT CHANGE UP (ref 150–400)
POTASSIUM SERPL-MCNC: 4.2 MMOL/L — SIGNIFICANT CHANGE UP (ref 3.5–5.3)
POTASSIUM SERPL-SCNC: 4.2 MMOL/L — SIGNIFICANT CHANGE UP (ref 3.5–5.3)
PROT SERPL-MCNC: 6.7 G/DL — SIGNIFICANT CHANGE UP (ref 6–8.3)
RBC # BLD: 3.74 M/UL — LOW (ref 4.2–5.8)
RBC # FLD: 13.1 % — SIGNIFICANT CHANGE UP (ref 10.3–14.5)
SODIUM SERPL-SCNC: 138 MMOL/L — SIGNIFICANT CHANGE UP (ref 135–145)
WBC # BLD: 12.9 K/UL — HIGH (ref 3.8–10.5)
WBC # FLD AUTO: 12.9 K/UL — HIGH (ref 3.8–10.5)

## 2024-03-23 PROCEDURE — 99233 SBSQ HOSP IP/OBS HIGH 50: CPT | Mod: GC

## 2024-03-23 RX ADMIN — Medication 2: at 17:11

## 2024-03-23 RX ADMIN — Medication 1: at 12:31

## 2024-03-23 RX ADMIN — HYDROMORPHONE HYDROCHLORIDE 1 MILLIGRAM(S): 2 INJECTION INTRAMUSCULAR; INTRAVENOUS; SUBCUTANEOUS at 21:29

## 2024-03-23 RX ADMIN — PANTOPRAZOLE SODIUM 40 MILLIGRAM(S): 20 TABLET, DELAYED RELEASE ORAL at 06:14

## 2024-03-23 RX ADMIN — HYDROMORPHONE HYDROCHLORIDE 1 MILLIGRAM(S): 2 INJECTION INTRAMUSCULAR; INTRAVENOUS; SUBCUTANEOUS at 06:14

## 2024-03-23 RX ADMIN — HYDROMORPHONE HYDROCHLORIDE 1 MILLIGRAM(S): 2 INJECTION INTRAMUSCULAR; INTRAVENOUS; SUBCUTANEOUS at 21:59

## 2024-03-23 RX ADMIN — HYDROMORPHONE HYDROCHLORIDE 1 MILLIGRAM(S): 2 INJECTION INTRAMUSCULAR; INTRAVENOUS; SUBCUTANEOUS at 00:42

## 2024-03-23 RX ADMIN — HYDROMORPHONE HYDROCHLORIDE 1 MILLIGRAM(S): 2 INJECTION INTRAMUSCULAR; INTRAVENOUS; SUBCUTANEOUS at 16:00

## 2024-03-23 RX ADMIN — HYDROMORPHONE HYDROCHLORIDE 1 MILLIGRAM(S): 2 INJECTION INTRAMUSCULAR; INTRAVENOUS; SUBCUTANEOUS at 10:41

## 2024-03-23 RX ADMIN — SENNA PLUS 2 TABLET(S): 8.6 TABLET ORAL at 21:28

## 2024-03-23 RX ADMIN — HYDROMORPHONE HYDROCHLORIDE 1 MILLIGRAM(S): 2 INJECTION INTRAMUSCULAR; INTRAVENOUS; SUBCUTANEOUS at 01:30

## 2024-03-23 RX ADMIN — Medication 10 MILLIGRAM(S): at 06:15

## 2024-03-23 RX ADMIN — Medication 100 MILLIGRAM(S): at 10:37

## 2024-03-23 RX ADMIN — HYDROMORPHONE HYDROCHLORIDE 1 MILLIGRAM(S): 2 INJECTION INTRAMUSCULAR; INTRAVENOUS; SUBCUTANEOUS at 09:36

## 2024-03-23 RX ADMIN — Medication 100 MILLIGRAM(S): at 01:59

## 2024-03-23 RX ADMIN — HYDROMORPHONE HYDROCHLORIDE 1 MILLIGRAM(S): 2 INJECTION INTRAMUSCULAR; INTRAVENOUS; SUBCUTANEOUS at 15:29

## 2024-03-23 RX ADMIN — Medication 3 MILLIGRAM(S): at 21:29
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
## 2024-03-23 NOTE — PROGRESS NOTE ADULT - SUBJECTIVE AND OBJECTIVE BOX
Initial Eval Note    63y Male admitted POD # 1 from Open reduction and internal fixation of intertrochanteric fracture of left hip        Vital Signs Last 24 Hrs  T(C): 36.8 (23 Mar 2024 21:44), Max: 37.8 (22 Mar 2024 23:00)  T(F): 98.3 (23 Mar 2024 21:44), Max: 100.1 (23 Mar 2024 13:57)  HR: 81 (23 Mar 2024 21:44) (81 - 112)  BP: 152/73 (23 Mar 2024 21:44) (129/70 - 175/101)  BP(mean): --  RR: 18 (23 Mar 2024 21:44) (18 - 18)  SpO2: 92% (23 Mar 2024 21:44) (92% - 98%)    Parameters below as of 23 Mar 2024 06:11  Patient On (Oxygen Delivery Method): room air                                11.3   12.90 )-----------( 202      ( 23 Mar 2024 07:30 )             33.3         03-23    138  |  104  |  16  ----------------------------<  156<H>  4.2   |  25  |  0.95    Ca    8.5      23 Mar 2024 07:30  Phos  2.8     03-23  Mg     1.9     03-23    TPro  6.7  /  Alb  3.0<L>  /  TBili  1.2  /  DBili  x   /  AST  18  /  ALT  14  /  AlkPhos  77  03-23        NEURO: no headaches, blurry vision, tremors, depression, anxity  CV: no chest pain, palpitations, murmurs, orthopnea  Resp: no shortness of breath, cough, wheeze, sputum production  GI: no stomach pain,nausea, vomitting, flatulence, hematemesis, hematochezia  PV: no swelling of extremities, no hair loss, no coolness to extremities  ENDO: no polydypsia, polyphagia, polyuria, weight loss, night sweats          NEURO: awake and alert  CV: (+) S1/S2, rrr, no mrg  RESP: CTA b/l  GI: soft, non tender
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
## 2024-03-23 NOTE — PROGRESS NOTE ADULT - SUBJECTIVE AND OBJECTIVE BOX
Patient is a 63y old  Male who presents with a chief complaint of mechanical fall (20 Mar 2024 23:27)      INTERVAL HPI/OVERNIGHT EVENTS: Patient seen and examined at bedside. No overnight events.    MEDICATIONS  (STANDING):  dextrose 5%. 1000 milliLiter(s) (50 mL/Hr) IV Continuous <Continuous>  dextrose 5%. 1000 milliLiter(s) (100 mL/Hr) IV Continuous <Continuous>  dextrose 50% Injectable 25 Gram(s) IV Push once  dextrose 50% Injectable 12.5 Gram(s) IV Push once  dextrose 50% Injectable 25 Gram(s) IV Push once  enalapril 10 milliGRAM(s) Oral daily  glucagon  Injectable 1 milliGRAM(s) IntraMuscular once  insulin lispro (ADMELOG) corrective regimen sliding scale   SubCutaneous three times a day before meals  insulin lispro (ADMELOG) corrective regimen sliding scale   SubCutaneous at bedtime  magnesium sulfate  IVPB 2 Gram(s) IV Intermittent every 2 hours  pantoprazole    Tablet 40 milliGRAM(s) Oral before breakfast  polyethylene glycol 3350 17 Gram(s) Oral daily  senna 2 Tablet(s) Oral at bedtime  sodium chloride 0.9%. 1000 milliLiter(s) (100 mL/Hr) IV Continuous <Continuous>    MEDICATIONS  (PRN):  acetaminophen     Tablet .. 650 milliGRAM(s) Oral every 6 hours PRN Temp greater or equal to 38C (100.4F), Mild Pain (1 - 3)  aluminum hydroxide/magnesium hydroxide/simethicone Suspension 30 milliLiter(s) Oral every 4 hours PRN Dyspepsia  dextrose Oral Gel 15 Gram(s) Oral once PRN Blood Glucose LESS THAN 70 milliGRAM(s)/deciliter  HYDROmorphone  Injectable 2 milliGRAM(s) IV Push every 4 hours PRN Severe Pain (7 - 10)  HYDROmorphone  Injectable 1 milliGRAM(s) IV Push every 4 hours PRN Moderate Pain (4 - 6)  melatonin 3 milliGRAM(s) Oral at bedtime PRN Insomnia  ondansetron Injectable 4 milliGRAM(s) IV Push every 8 hours PRN Nausea and/or Vomiting      Allergies  No Known Allergies  Intolerances        REVIEW OF SYSTEMS:  CONSTITUTIONAL: No fever or chills  HEENT:  No headache, no sore throat  RESPIRATORY: No cough, wheezing, or shortness of breath  CARDIOVASCULAR: No chest pain, palpitations  GASTROINTESTINAL: No abd pain, nausea, vomiting, or diarrhea  GENITOURINARY: No dysuria, frequency, or hematuria  NEUROLOGICAL: no focal weakness or dizziness  MUSCULOSKELETAL: no myalgias       Vital Signs Last 24 Hrs  T(C): 37.8 (23 Mar 2024 13:57), Max: 38.1 (22 Mar 2024 21:00)  T(F): 100.1 (23 Mar 2024 13:57), Max: 100.5 (22 Mar 2024 21:00)  HR: 112 (23 Mar 2024 13:57) (64 - 112)  BP: 129/70 (23 Mar 2024 13:57) (114/65 - 175/101)  BP(mean): --  RR: 18 (23 Mar 2024 13:57) (15 - 20)  SpO2: 97% (23 Mar 2024 13:57) (96% - 98%)    Parameters below as of 23 Mar 2024 06:11  Patient On (Oxygen Delivery Method): room air          PHYSICAL EXAM:  GENERAL: NAD  HEENT:  AT/NC, anicteric, moist mucous membranes, EOMI, no lid-lag, conjunctiva and sclera clear  CHEST/LUNG:  CTA b/l, no rales, wheezes, or rhonchi,  normal respiratory effort, no intercostal retractions  HEART:  RRR, S1, S2, no murmurs; no pitting edema  ABDOMEN:  BS+, soft, nontender, nondistended; No HSM  MSK/EXTREMITIES: 2+ peripheral pulses, no clubbing or cyanosis  NERVOUS SYSTEM: answers questions and follows commands appropriately, A&Ox3 grossly moves all extremities   PSYCH: Appropriate affect, Alert & Awake; Good judgement      LABS: Personally reviewed  CBC                           11.3   12.90 )-----------( 202      ( 23 Mar 2024 07:30 )             33.3       03-23  138  |  104  |  16  ----------------------------<  156<H>  4.2   |  25  |  0.95  Ca    8.5      23 Mar 2024 07:30  Phos  2.8     03-23  Mg     1.9     03-23  TPro  6.7  /  Alb  3.0<L>  /  TBili  1.2  /  DBili  x   /  AST  18  /  ALT  14  /  AlkPhos  77  03-23          Urinalysis Basic - ( 23 Mar 2024 07:30 )  Color: x / Appearance: x / SG: x / pH: x  Gluc: 156 mg/dL / Ketone: x  / Bili: x / Urobili: x   Blood: x / Protein: x / Nitrite: x   Leuk Esterase: x / RBC: x / WBC x   Sq Epi: x / Non Sq Epi: x / Bacteria: x                  CAPILLARY BLOOD GLUCOSE  POCT Blood Glucose.: 192 mg/dL (23 Mar 2024 12:30)        Urinalysis Basic - ( 21 Mar 2024 06:01 )  Color: x / Appearance: x / SG: x / pH: x  Gluc: 181 mg/dL / Ketone: x  / Bili: x / Urobili: x   Blood: x / Protein: x / Nitrite: x   Leuk Esterase: x / RBC: x / WBC x   Sq Epi: x / Non Sq Epi: x / Bacteria: x      PT/INR - ( 21 Mar 2024 06:01 )   PT: 12.7 sec;   INR: 1.12 ratio    PTT - ( 21 Mar 2024 06:01 )  PTT:31.2 sec      CAPILLARY BLOOD GLUCOSE  POCT Blood Glucose.: 155 mg/dL (22 Mar 2024 05:50)                  RADIOLOGY & ADDITIONAL TESTS: Personally reviewed.     Consultant(s) Notes Reviewed:  [x] YES  [ ] NO   Discussed with GIOVANNI/GRACE, RN     Patient is a 63y old  Male who presents with a chief complaint of mechanical fall (20 Mar 2024 23:27)      INTERVAL HPI/OVERNIGHT EVENTS: Patient seen and examined at bedside. Complains of pain in left leg    MEDICATIONS  (STANDING):  dextrose 5%. 1000 milliLiter(s) (50 mL/Hr) IV Continuous <Continuous>  dextrose 5%. 1000 milliLiter(s) (100 mL/Hr) IV Continuous <Continuous>  dextrose 50% Injectable 25 Gram(s) IV Push once  dextrose 50% Injectable 12.5 Gram(s) IV Push once  dextrose 50% Injectable 25 Gram(s) IV Push once  enalapril 10 milliGRAM(s) Oral daily  glucagon  Injectable 1 milliGRAM(s) IntraMuscular once  insulin lispro (ADMELOG) corrective regimen sliding scale   SubCutaneous three times a day before meals  insulin lispro (ADMELOG) corrective regimen sliding scale   SubCutaneous at bedtime  magnesium sulfate  IVPB 2 Gram(s) IV Intermittent every 2 hours  pantoprazole    Tablet 40 milliGRAM(s) Oral before breakfast  polyethylene glycol 3350 17 Gram(s) Oral daily  senna 2 Tablet(s) Oral at bedtime  sodium chloride 0.9%. 1000 milliLiter(s) (100 mL/Hr) IV Continuous <Continuous>    MEDICATIONS  (PRN):  acetaminophen     Tablet .. 650 milliGRAM(s) Oral every 6 hours PRN Temp greater or equal to 38C (100.4F), Mild Pain (1 - 3)  aluminum hydroxide/magnesium hydroxide/simethicone Suspension 30 milliLiter(s) Oral every 4 hours PRN Dyspepsia  dextrose Oral Gel 15 Gram(s) Oral once PRN Blood Glucose LESS THAN 70 milliGRAM(s)/deciliter  HYDROmorphone  Injectable 2 milliGRAM(s) IV Push every 4 hours PRN Severe Pain (7 - 10)  HYDROmorphone  Injectable 1 milliGRAM(s) IV Push every 4 hours PRN Moderate Pain (4 - 6)  melatonin 3 milliGRAM(s) Oral at bedtime PRN Insomnia  ondansetron Injectable 4 milliGRAM(s) IV Push every 8 hours PRN Nausea and/or Vomiting      Allergies  No Known Allergies  Intolerances        REVIEW OF SYSTEMS:  CONSTITUTIONAL: No fever or chills  HEENT:  No headache, no sore throat  RESPIRATORY: No cough, wheezing, or shortness of breath  CARDIOVASCULAR: No chest pain, palpitations  GASTROINTESTINAL: No abd pain, nausea, vomiting, or diarrhea  GENITOURINARY: No dysuria, frequency, or hematuria  NEUROLOGICAL: no focal weakness or dizziness  MUSCULOSKELETAL: no myalgias       Vital Signs Last 24 Hrs  T(C): 37.8 (23 Mar 2024 13:57), Max: 38.1 (22 Mar 2024 21:00)  T(F): 100.1 (23 Mar 2024 13:57), Max: 100.5 (22 Mar 2024 21:00)  HR: 112 (23 Mar 2024 13:57) (64 - 112)  BP: 129/70 (23 Mar 2024 13:57) (114/65 - 175/101)  BP(mean): --  RR: 18 (23 Mar 2024 13:57) (15 - 20)  SpO2: 97% (23 Mar 2024 13:57) (96% - 98%)    Parameters below as of 23 Mar 2024 06:11  Patient On (Oxygen Delivery Method): room air          PHYSICAL EXAM:  GENERAL: NAD  HEENT:  AT/NC, anicteric, moist mucous membranes, EOMI, no lid-lag, conjunctiva and sclera clear  CHEST/LUNG:  CTA b/l, no rales, wheezes, or rhonchi,  normal respiratory effort, no intercostal retractions  HEART:  RRR, S1, S2, no murmurs; no pitting edema  ABDOMEN:  BS+, soft, nontender, nondistended; No HSM  MSK/EXTREMITIES: 2+ peripheral pulses, no clubbing or cyanosis, dressings c/d/i  NERVOUS SYSTEM: answers questions and follows commands appropriately, A&Ox3 grossly moves all extremities   PSYCH: Appropriate affect, Alert & Awake; Good judgement      LABS: Personally reviewed  CBC                           11.3   12.90 )-----------( 202      ( 23 Mar 2024 07:30 )             33.3       03-23  138  |  104  |  16  ----------------------------<  156<H>  4.2   |  25  |  0.95  Ca    8.5      23 Mar 2024 07:30  Phos  2.8     03-23  Mg     1.9     03-23  TPro  6.7  /  Alb  3.0<L>  /  TBili  1.2  /  DBili  x   /  AST  18  /  ALT  14  /  AlkPhos  77  03-23          Urinalysis Basic - ( 23 Mar 2024 07:30 )  Color: x / Appearance: x / SG: x / pH: x  Gluc: 156 mg/dL / Ketone: x  / Bili: x / Urobili: x   Blood: x / Protein: x / Nitrite: x   Leuk Esterase: x / RBC: x / WBC x   Sq Epi: x / Non Sq Epi: x / Bacteria: x                  CAPILLARY BLOOD GLUCOSE  POCT Blood Glucose.: 192 mg/dL (23 Mar 2024 12:30)        Urinalysis Basic - ( 21 Mar 2024 06:01 )  Color: x / Appearance: x / SG: x / pH: x  Gluc: 181 mg/dL / Ketone: x  / Bili: x / Urobili: x   Blood: x / Protein: x / Nitrite: x   Leuk Esterase: x / RBC: x / WBC x   Sq Epi: x / Non Sq Epi: x / Bacteria: x      PT/INR - ( 21 Mar 2024 06:01 )   PT: 12.7 sec;   INR: 1.12 ratio    PTT - ( 21 Mar 2024 06:01 )  PTT:31.2 sec      CAPILLARY BLOOD GLUCOSE  POCT Blood Glucose.: 155 mg/dL (22 Mar 2024 05:50)                  RADIOLOGY & ADDITIONAL TESTS: Personally reviewed.     Consultant(s) Notes Reviewed:  [x] YES  [ ] NO   Discussed with GIOVANNI/GRACE, RN

## 2024-03-23 NOTE — PHYSICAL THERAPY INITIAL EVALUATION ADULT - ADDITIONAL COMMENTS
Pt lives in a single family home with his sister. 3 steps to enter with a left ascending railing and subsequent first floor set-up. Pt uses axillary crutches to ambulate at baseline. Also owns a manual WC and power WC.

## 2024-03-23 NOTE — PROGRESS NOTE ADULT - ASSESSMENT
63y Male admitted POD # 1 from Open reduction and internal fixation of intertrochanteric fracture of left hip        PLAN:      -serial exams, any changes in exam to be escelated to surgical team immedietly  -pain control  -finish post op anbx  -IVFs until taking adequate PO  -advance diet per surgical team  -dressing changes per surgical team  -DVT prophylaxis  -AM labs           TIME SPENT:  55 minutes of  time spent providing medical care for patient's acute illness/conditions that impairs at least one vital organ system and/or poses a high risk of imminent or life threatening deterioration in the patient's condition. It includes time spent evaluating and treating the patient's acute illness as well as time spent reviewing labs, radiology, discussing goals of care with patient and/or patient's family, and discussing the case with a multidisciplinary team, including the eICU, in an effort to prevent further life threatening deterioration or end organ damage. This time is independent of any procedures performed.    DATE OF ENTRY OF THIS NOTE IS EQUAL TO THE DATE OF SERVICES RENDERED

## 2024-03-24 ENCOUNTER — TRANSCRIPTION ENCOUNTER (OUTPATIENT)
Age: 64
End: 2024-03-24

## 2024-03-24 LAB
ANION GAP SERPL CALC-SCNC: 10 MMOL/L — SIGNIFICANT CHANGE UP (ref 5–17)
BUN SERPL-MCNC: 16 MG/DL — SIGNIFICANT CHANGE UP (ref 7–23)
CALCIUM SERPL-MCNC: 8.8 MG/DL — SIGNIFICANT CHANGE UP (ref 8.4–10.5)
CHLORIDE SERPL-SCNC: 103 MMOL/L — SIGNIFICANT CHANGE UP (ref 96–108)
CO2 SERPL-SCNC: 26 MMOL/L — SIGNIFICANT CHANGE UP (ref 22–31)
CREAT SERPL-MCNC: 0.91 MG/DL — SIGNIFICANT CHANGE UP (ref 0.5–1.3)
EGFR: 95 ML/MIN/1.73M2 — SIGNIFICANT CHANGE UP
GLUCOSE BLDC GLUCOMTR-MCNC: 166 MG/DL — HIGH (ref 70–99)
GLUCOSE BLDC GLUCOMTR-MCNC: 171 MG/DL — HIGH (ref 70–99)
GLUCOSE BLDC GLUCOMTR-MCNC: 182 MG/DL — HIGH (ref 70–99)
GLUCOSE BLDC GLUCOMTR-MCNC: 190 MG/DL — HIGH (ref 70–99)
GLUCOSE SERPL-MCNC: 176 MG/DL — HIGH (ref 70–99)
HCT VFR BLD CALC: 36.3 % — LOW (ref 39–50)
HGB BLD-MCNC: 12.2 G/DL — LOW (ref 13–17)
MCHC RBC-ENTMCNC: 30.1 PG — SIGNIFICANT CHANGE UP (ref 27–34)
MCHC RBC-ENTMCNC: 33.6 GM/DL — SIGNIFICANT CHANGE UP (ref 32–36)
MCV RBC AUTO: 89.6 FL — SIGNIFICANT CHANGE UP (ref 80–100)
NRBC # BLD: 0 /100 WBCS — SIGNIFICANT CHANGE UP (ref 0–0)
PLATELET # BLD AUTO: 203 K/UL — SIGNIFICANT CHANGE UP (ref 150–400)
POTASSIUM SERPL-MCNC: 4.4 MMOL/L — SIGNIFICANT CHANGE UP (ref 3.5–5.3)
POTASSIUM SERPL-SCNC: 4.4 MMOL/L — SIGNIFICANT CHANGE UP (ref 3.5–5.3)
RBC # BLD: 4.05 M/UL — LOW (ref 4.2–5.8)
RBC # FLD: 13 % — SIGNIFICANT CHANGE UP (ref 10.3–14.5)
SODIUM SERPL-SCNC: 139 MMOL/L — SIGNIFICANT CHANGE UP (ref 135–145)
WBC # BLD: 9.19 K/UL — SIGNIFICANT CHANGE UP (ref 3.8–10.5)
WBC # FLD AUTO: 9.19 K/UL — SIGNIFICANT CHANGE UP (ref 3.8–10.5)

## 2024-03-24 PROCEDURE — 99233 SBSQ HOSP IP/OBS HIGH 50: CPT | Mod: GC

## 2024-03-24 PROCEDURE — 73551 X-RAY EXAM OF FEMUR 1: CPT | Mod: 26,LT

## 2024-03-24 RX ORDER — TRAMADOL HYDROCHLORIDE 50 MG/1
50 TABLET ORAL EVERY 6 HOURS
Refills: 0 | Status: DISCONTINUED | OUTPATIENT
Start: 2024-03-24 | End: 2024-03-25

## 2024-03-24 RX ORDER — ACETAMINOPHEN 500 MG
975 TABLET ORAL EVERY 6 HOURS
Refills: 0 | Status: DISCONTINUED | OUTPATIENT
Start: 2024-03-24 | End: 2024-03-25

## 2024-03-24 RX ORDER — ENOXAPARIN SODIUM 100 MG/ML
40 INJECTION SUBCUTANEOUS EVERY 24 HOURS
Refills: 0 | Status: DISCONTINUED | OUTPATIENT
Start: 2024-03-24 | End: 2024-03-25

## 2024-03-24 RX ORDER — TRAMADOL HYDROCHLORIDE 50 MG/1
25 TABLET ORAL EVERY 6 HOURS
Refills: 0 | Status: DISCONTINUED | OUTPATIENT
Start: 2024-03-24 | End: 2024-03-25

## 2024-03-24 RX ADMIN — HYDROMORPHONE HYDROCHLORIDE 1 MILLIGRAM(S): 2 INJECTION INTRAMUSCULAR; INTRAVENOUS; SUBCUTANEOUS at 05:03

## 2024-03-24 RX ADMIN — Medication 10 MILLIGRAM(S): at 05:03

## 2024-03-24 RX ADMIN — TRAMADOL HYDROCHLORIDE 50 MILLIGRAM(S): 50 TABLET ORAL at 21:34

## 2024-03-24 RX ADMIN — HYDROMORPHONE HYDROCHLORIDE 1 MILLIGRAM(S): 2 INJECTION INTRAMUSCULAR; INTRAVENOUS; SUBCUTANEOUS at 05:33

## 2024-03-24 RX ADMIN — HYDROMORPHONE HYDROCHLORIDE 1 MILLIGRAM(S): 2 INJECTION INTRAMUSCULAR; INTRAVENOUS; SUBCUTANEOUS at 12:09

## 2024-03-24 RX ADMIN — Medication 1: at 16:52

## 2024-03-24 RX ADMIN — Medication 1: at 07:58

## 2024-03-24 RX ADMIN — HYDROMORPHONE HYDROCHLORIDE 1 MILLIGRAM(S): 2 INJECTION INTRAMUSCULAR; INTRAVENOUS; SUBCUTANEOUS at 12:24

## 2024-03-24 RX ADMIN — TRAMADOL HYDROCHLORIDE 50 MILLIGRAM(S): 50 TABLET ORAL at 22:04

## 2024-03-24 RX ADMIN — Medication 3 MILLIGRAM(S): at 21:34

## 2024-03-24 RX ADMIN — PANTOPRAZOLE SODIUM 40 MILLIGRAM(S): 20 TABLET, DELAYED RELEASE ORAL at 05:03

## 2024-03-24 RX ADMIN — POLYETHYLENE GLYCOL 3350 17 GRAM(S): 17 POWDER, FOR SOLUTION ORAL at 12:10

## 2024-03-24 RX ADMIN — Medication 650 MILLIGRAM(S): at 18:48

## 2024-03-24 RX ADMIN — Medication 1: at 12:13

## 2024-03-24 RX ADMIN — ENOXAPARIN SODIUM 40 MILLIGRAM(S): 100 INJECTION SUBCUTANEOUS at 12:08

## 2024-03-24 RX ADMIN — Medication 650 MILLIGRAM(S): at 19:18
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
## 2024-03-24 NOTE — DIETITIAN INITIAL EVALUATION ADULT - PERTINENT LABORATORY DATA
03-24    139  |  103  |  16  ----------------------------<  176<H>  4.4   |  26  |  0.91    Ca    8.8      24 Mar 2024 06:37  Phos  2.8     03-23  Mg     1.9     03-23    TPro  6.7  /  Alb  3.0<L>  /  TBili  1.2  /  DBili  x   /  AST  18  /  ALT  14  /  AlkPhos  77  03-23  POCT Blood Glucose.: 166 mg/dL (03-24-24 @ 07:54)  A1C with Estimated Average Glucose Result: 7.7 % (03-21-24 @ 06:01)

## 2024-03-24 NOTE — DIETITIAN INITIAL EVALUATION ADULT - PERTINENT MEDS FT
MEDICATIONS  (STANDING):  dextrose 5%. 1000 milliLiter(s) (100 mL/Hr) IV Continuous <Continuous>  dextrose 5%. 1000 milliLiter(s) (50 mL/Hr) IV Continuous <Continuous>  dextrose 50% Injectable 25 Gram(s) IV Push once  dextrose 50% Injectable 12.5 Gram(s) IV Push once  dextrose 50% Injectable 25 Gram(s) IV Push once  enalapril 10 milliGRAM(s) Oral daily  enoxaparin Injectable 40 milliGRAM(s) SubCutaneous every 24 hours  glucagon  Injectable 1 milliGRAM(s) IntraMuscular once  insulin lispro (ADMELOG) corrective regimen sliding scale   SubCutaneous three times a day before meals  insulin lispro (ADMELOG) corrective regimen sliding scale   SubCutaneous at bedtime  pantoprazole    Tablet 40 milliGRAM(s) Oral before breakfast  polyethylene glycol 3350 17 Gram(s) Oral daily  senna 2 Tablet(s) Oral at bedtime    MEDICATIONS  (PRN):  acetaminophen     Tablet .. 650 milliGRAM(s) Oral every 6 hours PRN Temp greater or equal to 38C (100.4F), Mild Pain (1 - 3)  aluminum hydroxide/magnesium hydroxide/simethicone Suspension 30 milliLiter(s) Oral every 4 hours PRN Dyspepsia  dextrose Oral Gel 15 Gram(s) Oral once PRN Blood Glucose LESS THAN 70 milliGRAM(s)/deciliter  HYDROmorphone  Injectable 0.5 milliGRAM(s) IV Push every 4 hours PRN Moderate Pain (4 - 6)  HYDROmorphone  Injectable 1 milliGRAM(s) IV Push every 4 hours PRN Severe Pain (7 - 10)  melatonin 3 milliGRAM(s) Oral at bedtime PRN Insomnia  ondansetron Injectable 4 milliGRAM(s) IV Push every 6 hours PRN Nausea and/or Vomiting

## 2024-03-24 NOTE — PROGRESS NOTE ADULT - ASSESSMENT
63y Male admitted POD # 2 from Open reduction and internal fixation of intertrochanteric fracture of left hip        PLAN:      -serial exams, any changes in exam to be escelated to surgical team immedietly  -pain control  -finish post op anbx  -IVFs until taking adequate PO  -advance diet per surgical team  -dressing changes per surgical team  -DVT prophylaxis  -AM labs           TIME SPENT:  35 minutes of  time spent providing medical care for patient's acute illness/conditions that impairs at least one vital organ system and/or poses a high risk of imminent or life threatening deterioration in the patient's condition. It includes time spent evaluating and treating the patient's acute illness as well as time spent reviewing labs, radiology, discussing goals of care with patient and/or patient's family, and discussing the case with a multidisciplinary team, including the eICU, in an effort to prevent further life threatening deterioration or end organ damage. This time is independent of any procedures performed.    DATE OF ENTRY OF THIS NOTE IS EQUAL TO THE DATE OF SERVICES RENDERED
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
## 2024-03-24 NOTE — DISCHARGE NOTE PROVIDER - ATTENDING DISCHARGE PHYSICAL EXAMINATION:
Constitutional: Pt lying in bed, awake and alert, NAD  HEENT: EOMI, normocephalic, moist mucous membranes  Neck: Soft and supple  Respiratory: CTABL, No wheezing, rales or rhonchi  Cardiovascular: S1S2+, RRR, no M/G/R  Gastrointestinal: BS+, soft, NT/ND, no guarding, no rebound  Extremities: No calf pain  Vascular: Peripheral pulses present  Neurological: AAOx3, no focal deficits  Musculoskeletal: Normal muscle tone, no atrophy, no rigidity, no contractions. Left BKA. L hip Dressing clean and dry  Skin: No significant new skin lesions or rashes

## 2024-03-24 NOTE — PROGRESS NOTE ADULT - SUBJECTIVE AND OBJECTIVE BOX
INTERVAL HPI/OVERNIGHT EVENTS:  Pt seen and examined at bedside this AM, resting comfortably. No acute events reported overnight. Pt reports pain is improved since yesterday. Hasn't worked with PT yet.  Denies fever/chills, chest pain, dyspnea, cough, dizziness.     Vital Signs Last 24 Hrs  T(C): 37.1 (24 Mar 2024 21:04), Max: 37.1 (24 Mar 2024 21:04)  T(F): 98.7 (24 Mar 2024 21:04), Max: 98.7 (24 Mar 2024 21:04)  HR: 82 (24 Mar 2024 21:04) (82 - 101)  BP: 136/66 (24 Mar 2024 21:04) (136/66 - 161/82)  BP(mean): --  RR: 16 (24 Mar 2024 21:04) (16 - 19)  SpO2: 97% (24 Mar 2024 21:04) (97% - 99%)    Parameters below as of 24 Mar 2024 13:16  Patient On (Oxygen Delivery Method): room air        PHYSICAL EXAM:  GENERAL: awake and alert, NAD  HEAD:  Atraumatic, Normocephalic  EYES: EOMI  CHEST/LUNG: non-labored breathing  HEART: normal HR  EXTREMITIES: L hip soft, no hematoma or ecchymosis, dressings with spotting, incision C/D/I; LLE BKA stump    I&O's Detail      LABS:                        12.2   9.19  )-----------( 203      ( 24 Mar 2024 06:37 )             36.3     03-24    139  |  103  |  16  ----------------------------<  176<H>  4.4   |  26  |  0.91    Ca    8.8      24 Mar 2024 06:37  Phos  2.8     03-23  Mg     1.9     03-23    TPro  6.7  /  Alb  3.0<L>  /  TBili  1.2  /  DBili  x   /  AST  18  /  ALT  14  /  AlkPhos  77  03-23      Urinalysis Basic - ( 24 Mar 2024 06:37 )    Color: x / Appearance: x / SG: x / pH: x  Gluc: 176 mg/dL / Ketone: x  / Bili: x / Urobili: x   Blood: x / Protein: x / Nitrite: x   Leuk Esterase: x / RBC: x / WBC x   Sq Epi: x / Non Sq Epi: x / Bacteria: x

## 2024-03-24 NOTE — DISCHARGE NOTE PROVIDER - NSDCMRMEDTOKEN_GEN_ALL_CORE_FT
Percocet 10/325 oral tablet:   Vitamin D3 2000 intl units (50 mcg) oral tablet: 1 tab(s) orally once a day  Xanax 1 mg oral tablet:    enalapril 10 mg oral tablet: 1 tab(s) orally once a day  Percocet 10/325 oral tablet:   traMADol 50 mg oral tablet: 0.5 tab(s) orally every 6 hours As needed Moderate Pain (4 - 6)  traMADol 50 mg oral tablet: 1 tab(s) orally every 4 hours  Vitamin D3 2000 intl units (50 mcg) oral tablet: 1 tab(s) orally once a day  Xanax 1 mg oral tablet:

## 2024-03-24 NOTE — DIETITIAN INITIAL EVALUATION ADULT - ORAL INTAKE PTA/DIET HISTORY
Pt reports good appetite, po intake 100% breakfast per observation. Pt reports T2DM diagnosis 1 year ago in which he implemented dietary changes for BG control and weight management. Noted HgbA1c now 7.7. Consistent Carbohydrate Diet Education reviewed with pt, handout provided, pt verbalized understanding.

## 2024-03-24 NOTE — DISCHARGE NOTE PROVIDER - NSDCHHCONTACT_GEN_ALL_CORE_FT
Impression: Primary open-angle glaucoma, mild stage, bilateral Plan: Pt has Glaucoma    Gonio :    deferred   Today's IOP :   13 OU Target IOP low to mid teens Pt denies Fhx of Glaucoma Right eye is the better seeing eye HVF 24-2 (04/28/2021) Full OU
C/D: 0.45x0.45/.6-.5 Pt denies Sulfa Allergy   // Pt denies Lung /Heart dx 1. Cont:
brimonidine BID OU Cosopt BID OU Lumigan QHS OU 2. Explained that currently there is no obvious vision loss from glaucoma. Condition and IOP stable without medication. Careful observation was discussed and is strongly recommended to prevent possible future vision loss. Will continue to monitor interocular pressure and testing in clinic at regular intervals. No treatment is being implemented at this time. 2. Return in 1 Year  with VF and IOP check with Dr Michele Castañeda 3.  Return in 6 months with Dr Jaquelin Wayne for Complete Exam.
As certified below, I, or a nurse practitioner or physician assistant working with me, had a face-to-face encounter that meets the physician face-to-face encounter requirements.
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
## 2024-03-24 NOTE — PROGRESS NOTE ADULT - SUBJECTIVE AND OBJECTIVE BOX
Patient is a 63y old  Male who presents with a chief complaint of mechanical fall (20 Mar 2024 23:27)    INTERVAL HPI/OVERNIGHT EVENTS: Patient seen and examined at bedside. Complains of pain in left leg    REVIEW OF SYSTEMS:  CONSTITUTIONAL: No weakness, fevers or chills  EYES/ENT: No visual changes;  No vertigo or throat pain   NECK: No pain or stiffness  RESPIRATORY: No cough, wheezing, hemoptysis; No shortness of breath  CARDIOVASCULAR: No chest pain or palpitations  GASTROINTESTINAL: No abdominal or epigastric pain. No nausea, vomiting; No diarrhea or constipation.   GENITOURINARY: No dysuria, frequency or hematuria  NEUROLOGICAL: No numbness or weakness  SKIN: No itching, burning, rashes, or lesions       Vital Signs Last 24 Hrs  T(C): 36.9 (24 Mar 2024 05:00), Max: 37.8 (23 Mar 2024 13:57)  T(F): 98.4 (24 Mar 2024 05:00), Max: 100.1 (23 Mar 2024 13:57)  HR: 101 (24 Mar 2024 05:00) (81 - 112)  BP: 161/82 (24 Mar 2024 05:00) (129/70 - 175/101)  BP(mean): --  RR: 18 (24 Mar 2024 05:00) (18 - 18)  SpO2: 99% (24 Mar 2024 05:00) (92% - 99%)    Parameters below as of 24 Mar 2024 05:00  Patient On (Oxygen Delivery Method): room air      I&O's Summary    22 Mar 2024 07:01  -  23 Mar 2024 07:00  --------------------------------------------------------  IN: 150 mL / OUT: 300 mL / NET: -150 mL        PHYSICAL EXAM  Constitutional: Pt lying in bed, awake and alert, NAD  HEENT: EOMI, normocephalic, moist mucous membranes  Neck: Soft and supple  Respiratory: CTABL, No wheezing, rales or rhonchi  Cardiovascular: S1S2+, RRR, no M/G/R  Gastrointestinal: BS+, soft, NT/ND, no guarding, no rebound  Extremities: No calf pain  Vascular: Peripheral pulses present  Neurological: AAOx3, no focal deficits  Musculoskeletal: Normal muscle tone, no atrophy, no rigidity, no contractions  Skin: No significant new skin lesions or rashes    Allergies  No Known Allergies  Intolerances        MEDICATIONS:  MEDICATIONS  (STANDING):  dextrose 5%. 1000 milliLiter(s) (100 mL/Hr) IV Continuous <Continuous>  dextrose 5%. 1000 milliLiter(s) (50 mL/Hr) IV Continuous <Continuous>  dextrose 50% Injectable 25 Gram(s) IV Push once  dextrose 50% Injectable 12.5 Gram(s) IV Push once  dextrose 50% Injectable 25 Gram(s) IV Push once  enalapril 10 milliGRAM(s) Oral daily  glucagon  Injectable 1 milliGRAM(s) IntraMuscular once  insulin lispro (ADMELOG) corrective regimen sliding scale   SubCutaneous three times a day before meals  insulin lispro (ADMELOG) corrective regimen sliding scale   SubCutaneous at bedtime  pantoprazole    Tablet 40 milliGRAM(s) Oral before breakfast  polyethylene glycol 3350 17 Gram(s) Oral daily  senna 2 Tablet(s) Oral at bedtime    MEDICATIONS  (PRN):  acetaminophen     Tablet .. 650 milliGRAM(s) Oral every 6 hours PRN Temp greater or equal to 38C (100.4F), Mild Pain (1 - 3)  aluminum hydroxide/magnesium hydroxide/simethicone Suspension 30 milliLiter(s) Oral every 4 hours PRN Dyspepsia  dextrose Oral Gel 15 Gram(s) Oral once PRN Blood Glucose LESS THAN 70 milliGRAM(s)/deciliter  HYDROmorphone  Injectable 0.5 milliGRAM(s) IV Push every 4 hours PRN Moderate Pain (4 - 6)  HYDROmorphone  Injectable 1 milliGRAM(s) IV Push every 4 hours PRN Severe Pain (7 - 10)  melatonin 3 milliGRAM(s) Oral at bedtime PRN Insomnia  ondansetron Injectable 4 milliGRAM(s) IV Push every 6 hours PRN Nausea and/or Vomiting      LABS: All Labs Reviewed:      RADIOLOGY & ADDITIONAL TESTS: Personally reviewed.     Consultant(s) Notes Reviewed:  [x] YES  [ ] NO   Discussed with GIOVANNI/GRACE, RN     Patient is a 63y old  Male who presents with a chief complaint of mechanical fall (20 Mar 2024 23:27)    INTERVAL HPI/OVERNIGHT EVENTS: Patient seen and examined at bedside. Complains of pain in left leg    REVIEW OF SYSTEMS:  CONSTITUTIONAL: No weakness, fevers or chills  EYES/ENT: No visual changes;  No vertigo or throat pain   NECK: No pain or stiffness  RESPIRATORY: No cough, wheezing, hemoptysis; No shortness of breath  CARDIOVASCULAR: No chest pain or palpitations  GASTROINTESTINAL: No abdominal or epigastric pain. No nausea, vomiting; No diarrhea or constipation.   GENITOURINARY: No dysuria, frequency or hematuria  NEUROLOGICAL: No numbness or weakness  SKIN: No itching, burning, rashes, or lesions       Vital Signs Last 24 Hrs  T(C): 36.9 (24 Mar 2024 05:00), Max: 37.8 (23 Mar 2024 13:57)  T(F): 98.4 (24 Mar 2024 05:00), Max: 100.1 (23 Mar 2024 13:57)  HR: 101 (24 Mar 2024 05:00) (81 - 112)  BP: 161/82 (24 Mar 2024 05:00) (129/70 - 175/101)  BP(mean): --  RR: 18 (24 Mar 2024 05:00) (18 - 18)  SpO2: 99% (24 Mar 2024 05:00) (92% - 99%)    Parameters below as of 24 Mar 2024 05:00  Patient On (Oxygen Delivery Method): room air      I&O's Summary    22 Mar 2024 07:01  -  23 Mar 2024 07:00  --------------------------------------------------------  IN: 150 mL / OUT: 300 mL / NET: -150 mL        PHYSICAL EXAM  Constitutional: Pt lying in bed, awake and alert, NAD  HEENT: EOMI, normocephalic, moist mucous membranes  Neck: Soft and supple  Respiratory: CTABL, No wheezing, rales or rhonchi  Cardiovascular: S1S2+, RRR, no M/G/R  Gastrointestinal: BS+, soft, NT/ND, no guarding, no rebound  Extremities: No calf pain  Vascular: Peripheral pulses present  Neurological: AAOx3, no focal deficits  Musculoskeletal: Normal muscle tone, no atrophy, no rigidity, no contractions  Skin: No significant new skin lesions or rashes    Allergies  No Known Allergies  Intolerances        MEDICATIONS:  MEDICATIONS  (STANDING):  dextrose 5%. 1000 milliLiter(s) (100 mL/Hr) IV Continuous <Continuous>  dextrose 5%. 1000 milliLiter(s) (50 mL/Hr) IV Continuous <Continuous>  dextrose 50% Injectable 25 Gram(s) IV Push once  dextrose 50% Injectable 12.5 Gram(s) IV Push once  dextrose 50% Injectable 25 Gram(s) IV Push once  enalapril 10 milliGRAM(s) Oral daily  glucagon  Injectable 1 milliGRAM(s) IntraMuscular once  insulin lispro (ADMELOG) corrective regimen sliding scale   SubCutaneous three times a day before meals  insulin lispro (ADMELOG) corrective regimen sliding scale   SubCutaneous at bedtime  pantoprazole    Tablet 40 milliGRAM(s) Oral before breakfast  polyethylene glycol 3350 17 Gram(s) Oral daily  senna 2 Tablet(s) Oral at bedtime    MEDICATIONS  (PRN):  acetaminophen     Tablet .. 650 milliGRAM(s) Oral every 6 hours PRN Temp greater or equal to 38C (100.4F), Mild Pain (1 - 3)  aluminum hydroxide/magnesium hydroxide/simethicone Suspension 30 milliLiter(s) Oral every 4 hours PRN Dyspepsia  dextrose Oral Gel 15 Gram(s) Oral once PRN Blood Glucose LESS THAN 70 milliGRAM(s)/deciliter  HYDROmorphone  Injectable 0.5 milliGRAM(s) IV Push every 4 hours PRN Moderate Pain (4 - 6)  HYDROmorphone  Injectable 1 milliGRAM(s) IV Push every 4 hours PRN Severe Pain (7 - 10)  melatonin 3 milliGRAM(s) Oral at bedtime PRN Insomnia  ondansetron Injectable 4 milliGRAM(s) IV Push every 6 hours PRN Nausea and/or Vomiting      LABS: All Labs Reviewed:               12.2   9.19  )-----------( 203      ( 24 Mar 2024 06:37 )             36.3     03-24    139  |  103  |  16  ----------------------------<  176<H>  4.4   |  26  |  0.91    Ca    8.8      24 Mar 2024 06:37  Phos  2.8     03-23  Mg     1.9     03-23    TPro  6.7  /  Alb  3.0<L>  /  TBili  1.2  /  DBili  x   /  AST  18  /  ALT  14  /  AlkPhos  77  03-23     CAPILLARY BLOOD GLUCOSE  POCT Blood Glucose.: 166 mg/dL (24 Mar 2024 07:54)  POCT Blood Glucose.: 185 mg/dL (23 Mar 2024 21:27)  POCT Blood Glucose.: 231 mg/dL (23 Mar 2024 17:06)  POCT Blood Glucose.: 192 mg/dL (23 Mar 2024 12:30)  POCT Blood Glucose.: 145 mg/dL (23 Mar 2024 08:42)  RADIOLOGY & ADDITIONAL TESTS: Personally reviewed.     Consultant(s) Notes Reviewed:  [x] YES  [ ] NO   Discussed with GIOVANNI/GRACE, RN     Patient is a 63y old  Male who presents with a chief complaint of mechanical fall (20 Mar 2024 23:27)    INTERVAL HPI/OVERNIGHT EVENTS: Patient seen and examined at bedside. Complains of pain in left leg    REVIEW OF SYSTEMS:  CONSTITUTIONAL: No weakness, fevers or chills  EYES/ENT: No visual changes;  No vertigo or throat pain   NECK: No pain or stiffness  RESPIRATORY: No cough, wheezing, hemoptysis; No shortness of breath  CARDIOVASCULAR: No chest pain or palpitations  GASTROINTESTINAL: No abdominal or epigastric pain. No nausea, vomiting; No diarrhea or constipation.   GENITOURINARY: No dysuria, frequency or hematuria  NEUROLOGICAL: No numbness or weakness  SKIN: No itching, burning, rashes, or lesions       Vital Signs Last 24 Hrs  T(C): 36.9 (24 Mar 2024 05:00), Max: 37.8 (23 Mar 2024 13:57)  T(F): 98.4 (24 Mar 2024 05:00), Max: 100.1 (23 Mar 2024 13:57)  HR: 101 (24 Mar 2024 05:00) (81 - 112)  BP: 161/82 (24 Mar 2024 05:00) (129/70 - 175/101)  BP(mean): --  RR: 18 (24 Mar 2024 05:00) (18 - 18)  SpO2: 99% (24 Mar 2024 05:00) (92% - 99%)    Parameters below as of 24 Mar 2024 05:00  Patient On (Oxygen Delivery Method): room air      I&O's Summary    22 Mar 2024 07:01  -  23 Mar 2024 07:00  --------------------------------------------------------  IN: 150 mL / OUT: 300 mL / NET: -150 mL      PHYSICAL EXAM  Constitutional: Pt lying in bed, awake and alert, NAD  HEENT: EOMI, normocephalic, moist mucous membranes  Neck: Soft and supple  Respiratory: CTABL, No wheezing, rales or rhonchi  Cardiovascular: S1S2+, RRR, no M/G/R  Gastrointestinal: BS+, soft, NT/ND, no guarding, no rebound  Extremities: No calf pain  Vascular: Peripheral pulses present  Neurological: AAOx3, no focal deficits  Musculoskeletal: Normal muscle tone, no atrophy, no rigidity, no contractions. Left BKA. Dressing clean and dry  Skin: No significant new skin lesions or rashes    Allergies  No Known Allergies  Intolerances      MEDICATIONS:  MEDICATIONS  (STANDING):  dextrose 5%. 1000 milliLiter(s) (100 mL/Hr) IV Continuous <Continuous>  dextrose 5%. 1000 milliLiter(s) (50 mL/Hr) IV Continuous <Continuous>  dextrose 50% Injectable 25 Gram(s) IV Push once  dextrose 50% Injectable 12.5 Gram(s) IV Push once  dextrose 50% Injectable 25 Gram(s) IV Push once  enalapril 10 milliGRAM(s) Oral daily  glucagon  Injectable 1 milliGRAM(s) IntraMuscular once  insulin lispro (ADMELOG) corrective regimen sliding scale   SubCutaneous three times a day before meals  insulin lispro (ADMELOG) corrective regimen sliding scale   SubCutaneous at bedtime  pantoprazole    Tablet 40 milliGRAM(s) Oral before breakfast  polyethylene glycol 3350 17 Gram(s) Oral daily  senna 2 Tablet(s) Oral at bedtime    MEDICATIONS  (PRN):  acetaminophen     Tablet .. 650 milliGRAM(s) Oral every 6 hours PRN Temp greater or equal to 38C (100.4F), Mild Pain (1 - 3)  aluminum hydroxide/magnesium hydroxide/simethicone Suspension 30 milliLiter(s) Oral every 4 hours PRN Dyspepsia  dextrose Oral Gel 15 Gram(s) Oral once PRN Blood Glucose LESS THAN 70 milliGRAM(s)/deciliter  HYDROmorphone  Injectable 0.5 milliGRAM(s) IV Push every 4 hours PRN Moderate Pain (4 - 6)  HYDROmorphone  Injectable 1 milliGRAM(s) IV Push every 4 hours PRN Severe Pain (7 - 10)  melatonin 3 milliGRAM(s) Oral at bedtime PRN Insomnia  ondansetron Injectable 4 milliGRAM(s) IV Push every 6 hours PRN Nausea and/or Vomiting      LABS: All Labs Reviewed:               12.2   9.19  )-----------( 203      ( 24 Mar 2024 06:37 )             36.3     03-24    139  |  103  |  16  ----------------------------<  176<H>  4.4   |  26  |  0.91    Ca    8.8      24 Mar 2024 06:37  Phos  2.8     03-23  Mg     1.9     03-23    TPro  6.7  /  Alb  3.0<L>  /  TBili  1.2  /  DBili  x   /  AST  18  /  ALT  14  /  AlkPhos  77  03-23     CAPILLARY BLOOD GLUCOSE  POCT Blood Glucose.: 166 mg/dL (24 Mar 2024 07:54)  POCT Blood Glucose.: 185 mg/dL (23 Mar 2024 21:27)  POCT Blood Glucose.: 231 mg/dL (23 Mar 2024 17:06)  POCT Blood Glucose.: 192 mg/dL (23 Mar 2024 12:30)  POCT Blood Glucose.: 145 mg/dL (23 Mar 2024 08:42)  RADIOLOGY & ADDITIONAL TESTS: Personally reviewed.     Consultant(s) Notes Reviewed:  [x] YES  [ ] NO   Discussed with GIOVANNI/GRACE, RN

## 2024-03-24 NOTE — DISCHARGE NOTE PROVIDER - NSDCCPCAREPLAN_GEN_ALL_CORE_FT
PRINCIPAL DISCHARGE DIAGNOSIS  Diagnosis: Fracture, proximal femur  Assessment and Plan of Treatment: You were seen in the hospital for a hip fracture and underwent surgery. Please follow up with your primary care doctor within 3 days and your orthopedic surgeon.

## 2024-03-24 NOTE — PROGRESS NOTE ADULT - TIME BILLING
care coordination, plan of care discsussed with patient face to face, 3E IDR team
care coordination, plan of care discsussed with patient face to face, 3E IDR team, ortho team
care coordination, plan of care discsussed with patient face to face, 3E IDR team, ortho team
care coordination, plan of care discsussed with patient face to face, 3E IDR team
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
## 2024-03-25 ENCOUNTER — INPATIENT (INPATIENT)
Facility: HOSPITAL | Age: 64
LOS: 10 days | Discharge: HOME HEALTH | DRG: 560 | End: 2024-04-05
Attending: PHYSICAL MEDICINE & REHABILITATION | Admitting: PHYSICAL MEDICINE & REHABILITATION
Payer: MEDICARE

## 2024-03-25 ENCOUNTER — TRANSCRIPTION ENCOUNTER (OUTPATIENT)
Age: 64
End: 2024-03-25

## 2024-03-25 VITALS
SYSTOLIC BLOOD PRESSURE: 147 MMHG | TEMPERATURE: 99 F | OXYGEN SATURATION: 98 % | WEIGHT: 234.79 LBS | RESPIRATION RATE: 15 BRPM | HEART RATE: 70 BPM | DIASTOLIC BLOOD PRESSURE: 63 MMHG | HEIGHT: 72 IN

## 2024-03-25 VITALS
RESPIRATION RATE: 17 BRPM | TEMPERATURE: 98 F | DIASTOLIC BLOOD PRESSURE: 74 MMHG | HEART RATE: 71 BPM | OXYGEN SATURATION: 98 % | SYSTOLIC BLOOD PRESSURE: 166 MMHG

## 2024-03-25 DIAGNOSIS — L89.896 PRESSURE-INDUCED DEEP TISSUE DAMAGE OF OTHER SITE: ICD-10-CM

## 2024-03-25 DIAGNOSIS — K21.9 GASTRO-ESOPHAGEAL REFLUX DISEASE WITHOUT ESOPHAGITIS: ICD-10-CM

## 2024-03-25 DIAGNOSIS — Z47.89 ENCOUNTER FOR OTHER ORTHOPEDIC AFTERCARE: ICD-10-CM

## 2024-03-25 DIAGNOSIS — E11.9 TYPE 2 DIABETES MELLITUS WITHOUT COMPLICATIONS: ICD-10-CM

## 2024-03-25 DIAGNOSIS — M86.68 OTHER CHRONIC OSTEOMYELITIS, OTHER SITE: ICD-10-CM

## 2024-03-25 DIAGNOSIS — E44.0 MODERATE PROTEIN-CALORIE MALNUTRITION: ICD-10-CM

## 2024-03-25 DIAGNOSIS — Z79.891 LONG TERM (CURRENT) USE OF OPIATE ANALGESIC: ICD-10-CM

## 2024-03-25 DIAGNOSIS — F17.210 NICOTINE DEPENDENCE, CIGARETTES, UNCOMPLICATED: ICD-10-CM

## 2024-03-25 DIAGNOSIS — Z89.512 ACQUIRED ABSENCE OF LEFT LEG BELOW KNEE: ICD-10-CM

## 2024-03-25 DIAGNOSIS — I10 ESSENTIAL (PRIMARY) HYPERTENSION: ICD-10-CM

## 2024-03-25 DIAGNOSIS — S72.146A NONDISPLACED INTERTROCHANTERIC FRACTURE OF UNSPECIFIED FEMUR, INITIAL ENCOUNTER FOR CLOSED FRACTURE: ICD-10-CM

## 2024-03-25 DIAGNOSIS — Z98.41 CATARACT EXTRACTION STATUS, RIGHT EYE: Chronic | ICD-10-CM
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
## 2024-03-25 DIAGNOSIS — I65.23 OCCLUSION AND STENOSIS OF BILATERAL CAROTID ARTERIES: ICD-10-CM

## 2024-03-25 DIAGNOSIS — S72.142D DISPLACED INTERTROCHANTERIC FRACTURE OF LEFT FEMUR, SUBSEQUENT ENCOUNTER FOR CLOSED FRACTURE WITH ROUTINE HEALING: ICD-10-CM

## 2024-03-25 DIAGNOSIS — K59.00 CONSTIPATION, UNSPECIFIED: ICD-10-CM

## 2024-03-25 DIAGNOSIS — Z89.512 ACQUIRED ABSENCE OF LEFT LEG BELOW KNEE: Chronic | ICD-10-CM

## 2024-03-25 DIAGNOSIS — E11.69 TYPE 2 DIABETES MELLITUS WITH OTHER SPECIFIED COMPLICATION: ICD-10-CM

## 2024-03-25 DIAGNOSIS — E55.9 VITAMIN D DEFICIENCY, UNSPECIFIED: ICD-10-CM

## 2024-03-25 DIAGNOSIS — I95.1 ORTHOSTATIC HYPOTENSION: ICD-10-CM

## 2024-03-25 DIAGNOSIS — Z51.89 ENCOUNTER FOR OTHER SPECIFIED AFTERCARE: ICD-10-CM

## 2024-03-25 LAB
ALBUMIN SERPL ELPH-MCNC: 2.7 G/DL — LOW (ref 3.3–5)
ALP SERPL-CCNC: 76 U/L — SIGNIFICANT CHANGE UP (ref 40–120)
ALT FLD-CCNC: 16 U/L — SIGNIFICANT CHANGE UP (ref 10–45)
ANION GAP SERPL CALC-SCNC: 7 MMOL/L — SIGNIFICANT CHANGE UP (ref 5–17)
AST SERPL-CCNC: 17 U/L — SIGNIFICANT CHANGE UP (ref 10–40)
BILIRUB SERPL-MCNC: 1.4 MG/DL — HIGH (ref 0.2–1.2)
BUN SERPL-MCNC: 13 MG/DL — SIGNIFICANT CHANGE UP (ref 7–23)
CALCIUM SERPL-MCNC: 8.6 MG/DL — SIGNIFICANT CHANGE UP (ref 8.4–10.5)
CHLORIDE SERPL-SCNC: 102 MMOL/L — SIGNIFICANT CHANGE UP (ref 96–108)
CO2 SERPL-SCNC: 28 MMOL/L — SIGNIFICANT CHANGE UP (ref 22–31)
CREAT SERPL-MCNC: 0.96 MG/DL — SIGNIFICANT CHANGE UP (ref 0.5–1.3)
EGFR: 88 ML/MIN/1.73M2 — SIGNIFICANT CHANGE UP
GLUCOSE BLDC GLUCOMTR-MCNC: 153 MG/DL — HIGH (ref 70–99)
GLUCOSE BLDC GLUCOMTR-MCNC: 179 MG/DL — HIGH (ref 70–99)
GLUCOSE BLDC GLUCOMTR-MCNC: 191 MG/DL — HIGH (ref 70–99)
GLUCOSE BLDC GLUCOMTR-MCNC: 193 MG/DL — HIGH (ref 70–99)
GLUCOSE SERPL-MCNC: 160 MG/DL — HIGH (ref 70–99)
HCT VFR BLD CALC: 33.1 % — LOW (ref 39–50)
HGB BLD-MCNC: 11.3 G/DL — LOW (ref 13–17)
MCHC RBC-ENTMCNC: 30.8 PG — SIGNIFICANT CHANGE UP (ref 27–34)
MCHC RBC-ENTMCNC: 34.1 GM/DL — SIGNIFICANT CHANGE UP (ref 32–36)
MCV RBC AUTO: 90.2 FL — SIGNIFICANT CHANGE UP (ref 80–100)
NRBC # BLD: 0 /100 WBCS — SIGNIFICANT CHANGE UP (ref 0–0)
PLATELET # BLD AUTO: 179 K/UL — SIGNIFICANT CHANGE UP (ref 150–400)
POTASSIUM SERPL-MCNC: 4.1 MMOL/L — SIGNIFICANT CHANGE UP (ref 3.5–5.3)
POTASSIUM SERPL-SCNC: 4.1 MMOL/L — SIGNIFICANT CHANGE UP (ref 3.5–5.3)
PROT SERPL-MCNC: 6.3 G/DL — SIGNIFICANT CHANGE UP (ref 6–8.3)
RBC # BLD: 3.67 M/UL — LOW (ref 4.2–5.8)
RBC # FLD: 13.1 % — SIGNIFICANT CHANGE UP (ref 10.3–14.5)
SODIUM SERPL-SCNC: 137 MMOL/L — SIGNIFICANT CHANGE UP (ref 135–145)
WBC # BLD: 6.55 K/UL — SIGNIFICANT CHANGE UP (ref 3.8–10.5)
WBC # FLD AUTO: 6.55 K/UL — SIGNIFICANT CHANGE UP (ref 3.8–10.5)

## 2024-03-25 PROCEDURE — C1769: CPT

## 2024-03-25 PROCEDURE — 99222 1ST HOSP IP/OBS MODERATE 55: CPT | Mod: GC

## 2024-03-25 PROCEDURE — 99239 HOSP IP/OBS DSCHRG MGMT >30: CPT

## 2024-03-25 PROCEDURE — 93306 TTE W/DOPPLER COMPLETE: CPT

## 2024-03-25 PROCEDURE — 84443 ASSAY THYROID STIM HORMONE: CPT

## 2024-03-25 PROCEDURE — 85610 PROTHROMBIN TIME: CPT

## 2024-03-25 PROCEDURE — 85025 COMPLETE CBC W/AUTO DIFF WBC: CPT

## 2024-03-25 PROCEDURE — 96376 TX/PRO/DX INJ SAME DRUG ADON: CPT

## 2024-03-25 PROCEDURE — 83036 HEMOGLOBIN GLYCOSYLATED A1C: CPT

## 2024-03-25 PROCEDURE — 97162 PT EVAL MOD COMPLEX 30 MIN: CPT

## 2024-03-25 PROCEDURE — 76000 FLUOROSCOPY <1 HR PHYS/QHP: CPT

## 2024-03-25 PROCEDURE — 80053 COMPREHEN METABOLIC PANEL: CPT

## 2024-03-25 PROCEDURE — 97166 OT EVAL MOD COMPLEX 45 MIN: CPT

## 2024-03-25 PROCEDURE — C1713: CPT

## 2024-03-25 PROCEDURE — 36415 COLL VENOUS BLD VENIPUNCTURE: CPT

## 2024-03-25 PROCEDURE — 86900 BLOOD TYPING SEROLOGIC ABO: CPT

## 2024-03-25 PROCEDURE — 72192 CT PELVIS W/O DYE: CPT | Mod: MC

## 2024-03-25 PROCEDURE — 86803 HEPATITIS C AB TEST: CPT

## 2024-03-25 PROCEDURE — 93005 ELECTROCARDIOGRAM TRACING: CPT

## 2024-03-25 PROCEDURE — C9399: CPT

## 2024-03-25 PROCEDURE — 83735 ASSAY OF MAGNESIUM: CPT

## 2024-03-25 PROCEDURE — 96374 THER/PROPH/DIAG INJ IV PUSH: CPT

## 2024-03-25 PROCEDURE — 99285 EMERGENCY DEPT VISIT HI MDM: CPT | Mod: 25

## 2024-03-25 PROCEDURE — 86901 BLOOD TYPING SEROLOGIC RH(D): CPT

## 2024-03-25 PROCEDURE — 85027 COMPLETE CBC AUTOMATED: CPT

## 2024-03-25 PROCEDURE — 76376 3D RENDER W/INTRP POSTPROCES: CPT

## 2024-03-25 PROCEDURE — 71045 X-RAY EXAM CHEST 1 VIEW: CPT

## 2024-03-25 PROCEDURE — 73502 X-RAY EXAM HIP UNI 2-3 VIEWS: CPT

## 2024-03-25 PROCEDURE — 85730 THROMBOPLASTIN TIME PARTIAL: CPT

## 2024-03-25 PROCEDURE — 86850 RBC ANTIBODY SCREEN: CPT

## 2024-03-25 PROCEDURE — 73551 X-RAY EXAM OF FEMUR 1: CPT

## 2024-03-25 PROCEDURE — 84100 ASSAY OF PHOSPHORUS: CPT

## 2024-03-25 PROCEDURE — 80048 BASIC METABOLIC PNL TOTAL CA: CPT

## 2024-03-25 PROCEDURE — 82962 GLUCOSE BLOOD TEST: CPT

## 2024-03-25 PROCEDURE — 80061 LIPID PANEL: CPT

## 2024-03-25 PROCEDURE — 97530 THERAPEUTIC ACTIVITIES: CPT

## 2024-03-25 RX ORDER — LANOLIN ALCOHOL/MO/W.PET/CERES
6 CREAM (GRAM) TOPICAL AT BEDTIME
Refills: 0 | Status: DISCONTINUED | OUTPATIENT
Start: 2024-03-25 | End: 2024-04-03

## 2024-03-25 RX ORDER — GLUCAGON INJECTION, SOLUTION 0.5 MG/.1ML
1 INJECTION, SOLUTION SUBCUTANEOUS ONCE
Refills: 0 | Status: DISCONTINUED | OUTPATIENT
Start: 2024-03-25 | End: 2024-04-05

## 2024-03-25 RX ORDER — LANOLIN ALCOHOL/MO/W.PET/CERES
3 CREAM (GRAM) TOPICAL AT BEDTIME
Refills: 0 | Status: DISCONTINUED | OUTPATIENT
Start: 2024-03-25 | End: 2024-03-25

## 2024-03-25 RX ORDER — DEXTROSE 50 % IN WATER 50 %
25 SYRINGE (ML) INTRAVENOUS ONCE
Refills: 0 | Status: DISCONTINUED | OUTPATIENT
Start: 2024-03-25 | End: 2024-04-05

## 2024-03-25 RX ORDER — ENOXAPARIN SODIUM 100 MG/ML
40 INJECTION SUBCUTANEOUS EVERY 24 HOURS
Refills: 0 | Status: DISCONTINUED | OUTPATIENT
Start: 2024-03-26 | End: 2024-04-05

## 2024-03-25 RX ORDER — OXYCODONE HYDROCHLORIDE 5 MG/1
5 TABLET ORAL ONCE
Refills: 0 | Status: DISCONTINUED | OUTPATIENT
Start: 2024-03-25 | End: 2024-03-25

## 2024-03-25 RX ORDER — ACETAMINOPHEN 500 MG
975 TABLET ORAL EVERY 6 HOURS
Refills: 0 | Status: DISCONTINUED | OUTPATIENT
Start: 2024-03-25 | End: 2024-04-05

## 2024-03-25 RX ORDER — INFLUENZA VIRUS VACCINE 15; 15; 15; 15 UG/.5ML; UG/.5ML; UG/.5ML; UG/.5ML
0.5 SUSPENSION INTRAMUSCULAR ONCE
Refills: 0 | Status: COMPLETED | OUTPATIENT
Start: 2024-03-25 | End: 2024-03-25

## 2024-03-25 RX ORDER — POLYETHYLENE GLYCOL 3350 17 G/17G
17 POWDER, FOR SOLUTION ORAL DAILY
Refills: 0 | Status: DISCONTINUED | OUTPATIENT
Start: 2024-03-26 | End: 2024-04-05

## 2024-03-25 RX ORDER — TRAMADOL HYDROCHLORIDE 50 MG/1
25 TABLET ORAL EVERY 6 HOURS
Refills: 0 | Status: DISCONTINUED | OUTPATIENT
Start: 2024-03-25 | End: 2024-03-27

## 2024-03-25 RX ORDER — INSULIN LISPRO 100/ML
VIAL (ML) SUBCUTANEOUS AT BEDTIME
Refills: 0 | Status: DISCONTINUED | OUTPATIENT
Start: 2024-03-25 | End: 2024-04-05

## 2024-03-25 RX ORDER — TRAMADOL HYDROCHLORIDE 50 MG/1
50 TABLET ORAL EVERY 4 HOURS
Refills: 0 | Status: DISCONTINUED | OUTPATIENT
Start: 2024-03-25 | End: 2024-03-27

## 2024-03-25 RX ORDER — SODIUM CHLORIDE 9 MG/ML
1000 INJECTION, SOLUTION INTRAVENOUS
Refills: 0 | Status: DISCONTINUED | OUTPATIENT
Start: 2024-03-25 | End: 2024-04-05

## 2024-03-25 RX ORDER — TRAMADOL HYDROCHLORIDE 50 MG/1
1 TABLET ORAL
Qty: 0 | Refills: 0 | DISCHARGE
Start: 2024-03-25

## 2024-03-25 RX ORDER — SENNA PLUS 8.6 MG/1
2 TABLET ORAL AT BEDTIME
Refills: 0 | Status: DISCONTINUED | OUTPATIENT
Start: 2024-03-25 | End: 2024-04-05

## 2024-03-25 RX ORDER — DEXTROSE 50 % IN WATER 50 %
15 SYRINGE (ML) INTRAVENOUS ONCE
Refills: 0 | Status: DISCONTINUED | OUTPATIENT
Start: 2024-03-25 | End: 2024-04-05

## 2024-03-25 RX ORDER — TRAMADOL HYDROCHLORIDE 50 MG/1
50 TABLET ORAL EVERY 4 HOURS
Refills: 0 | Status: DISCONTINUED | OUTPATIENT
Start: 2024-03-25 | End: 2024-03-25

## 2024-03-25 RX ORDER — DEXTROSE 50 % IN WATER 50 %
12.5 SYRINGE (ML) INTRAVENOUS ONCE
Refills: 0 | Status: DISCONTINUED | OUTPATIENT
Start: 2024-03-25 | End: 2024-04-05

## 2024-03-25 RX ORDER — PANTOPRAZOLE SODIUM 20 MG/1
40 TABLET, DELAYED RELEASE ORAL
Refills: 0 | Status: DISCONTINUED | OUTPATIENT
Start: 2024-03-26 | End: 2024-04-05

## 2024-03-25 RX ORDER — INSULIN LISPRO 100/ML
VIAL (ML) SUBCUTANEOUS
Refills: 0 | Status: DISCONTINUED | OUTPATIENT
Start: 2024-03-25 | End: 2024-04-05

## 2024-03-25 RX ORDER — TRAMADOL HYDROCHLORIDE 50 MG/1
0.5 TABLET ORAL
Qty: 0 | Refills: 0 | DISCHARGE
Start: 2024-03-25

## 2024-03-25 RX ADMIN — TRAMADOL HYDROCHLORIDE 50 MILLIGRAM(S): 50 TABLET ORAL at 18:40

## 2024-03-25 RX ADMIN — SENNA PLUS 2 TABLET(S): 8.6 TABLET ORAL at 22:12

## 2024-03-25 RX ADMIN — ENOXAPARIN SODIUM 40 MILLIGRAM(S): 100 INJECTION SUBCUTANEOUS at 11:53

## 2024-03-25 RX ADMIN — OXYCODONE HYDROCHLORIDE 5 MILLIGRAM(S): 5 TABLET ORAL at 09:48

## 2024-03-25 RX ADMIN — TRAMADOL HYDROCHLORIDE 50 MILLIGRAM(S): 50 TABLET ORAL at 06:28

## 2024-03-25 RX ADMIN — Medication 975 MILLIGRAM(S): at 06:29

## 2024-03-25 RX ADMIN — Medication 975 MILLIGRAM(S): at 00:16

## 2024-03-25 RX ADMIN — Medication 975 MILLIGRAM(S): at 17:50

## 2024-03-25 RX ADMIN — Medication 975 MILLIGRAM(S): at 17:20

## 2024-03-25 RX ADMIN — TRAMADOL HYDROCHLORIDE 50 MILLIGRAM(S): 50 TABLET ORAL at 06:59

## 2024-03-25 RX ADMIN — TRAMADOL HYDROCHLORIDE 50 MILLIGRAM(S): 50 TABLET ORAL at 12:28

## 2024-03-25 RX ADMIN — Medication 975 MILLIGRAM(S): at 12:24

## 2024-03-25 RX ADMIN — Medication 1: at 08:24

## 2024-03-25 RX ADMIN — POLYETHYLENE GLYCOL 3350 17 GRAM(S): 17 POWDER, FOR SOLUTION ORAL at 11:52

## 2024-03-25 RX ADMIN — Medication 1: at 11:52

## 2024-03-25 RX ADMIN — Medication 975 MILLIGRAM(S): at 06:59

## 2024-03-25 RX ADMIN — TRAMADOL HYDROCHLORIDE 50 MILLIGRAM(S): 50 TABLET ORAL at 22:11

## 2024-03-25 RX ADMIN — Medication 1: at 17:20

## 2024-03-25 RX ADMIN — TRAMADOL HYDROCHLORIDE 50 MILLIGRAM(S): 50 TABLET ORAL at 11:56

## 2024-03-25 RX ADMIN — Medication 6 MILLIGRAM(S): at 22:12

## 2024-03-25 RX ADMIN — Medication 10 MILLIGRAM(S): at 06:29

## 2024-03-25 RX ADMIN — PANTOPRAZOLE SODIUM 40 MILLIGRAM(S): 20 TABLET, DELAYED RELEASE ORAL at 06:29

## 2024-03-25 RX ADMIN — Medication 975 MILLIGRAM(S): at 11:54

## 2024-03-25 RX ADMIN — TRAMADOL HYDROCHLORIDE 50 MILLIGRAM(S): 50 TABLET ORAL at 17:20

## 2024-03-25 NOTE — PROGRESS NOTE ADULT - REASON FOR ADMISSION
mechanical fall

## 2024-03-25 NOTE — H&P ADULT - NSHPREVIEWOFSYSTEMS_GEN_ALL_CORE
REVIEW OF SYSTEMS  Constitutional: No fever, No Chills, No fatigue  HEENT: No eye pain, No visual disturbances, No difficulty hearing  Pulm: No cough,  No shortness of breath  Cardio: No chest pain, No palpitations  GI:  No abdominal pain, No nausea, No vomiting, No diarrhea, No constipation  : No dysuria, No frequency, No hematuria  Neuro: No headaches, No memory loss, + loss of strength, no numbness, No tremors  Skin: No itching, No rashes, No lesions   Endo: No temperature intolerance  MSK: No joint pain, No joint swelling, No muscle pain, No Neck pain,  No back pain  Psych:  No depression, No anxiety Constitutional: No fever, No Chills, No fatigue  HEENT: No eye pain, No visual disturbances, No difficulty hearing  Pulm: No cough,  No shortness of breath  Cardio: No chest pain, No palpitations  GI:  No abdominal pain, No nausea, No vomiting, No diarrhea, No constipation  : No dysuria, No frequency, No hematuria  Neuro: No headaches, No memory loss, + loss of strength, no numbness, No tremors  Skin: No itching, No rashes, No lesions   Endo: No temperature intolerance  MSK: No joint pain, No joint swelling, No muscle pain, No Neck pain,  No back pain  Psych:  No depression, No anxiety

## 2024-03-25 NOTE — PATIENT PROFILE ADULT - NSPROPTRIGHTNOTIFY_GEN_A_NUR
"ED Provider Note    Scribed for Galileo Trujillo M.D. by Michael William. 6/10/2022  7:05 AM    Primary care provider: Pcp Pt States None  Means of arrival: Walk in   History obtained from: Patient   History limited by: None     CHIEF COMPLAINT  Chief Complaint   Patient presents with   • Eye Pain     Right eye, due to hard contacts       HPI  Monico Nixon is a 34 y.o. male who presents to the Emergency Department for evaluation of acute right eye pain onset this morning. Patient reports he was at work when some dust flew into his right eye. Patient was wearing hard contacts during this time and states this kind of irritation has happened before.  Patient has removed the contacts in the ED. He presents associated symptoms of redness to right eye. Denies left eye pain or vision loss. No alleviating factors noted at this time. Patient is vaccinated for COVID x2.    REVIEW OF SYSTEMS  Pertinent positives include right eye pain, redness to right eye.   Pertinent negatives include no left eye pain or vision loss.    See HPI for further details.     PAST MEDICAL HISTORY   None reported    SURGICAL HISTORY  patient denies any surgical history    SOCIAL HISTORY  Social History     Tobacco Use   • Smoking status: Never Smoker   • Smokeless tobacco: Never Used   Vaping Use   • Vaping Use: Never used   Substance Use Topics   • Alcohol use: Yes     Comment: occ   • Drug use: Never      Social History     Substance and Sexual Activity   Drug Use Never       FAMILY HISTORY  None reported     CURRENT MEDICATIONS  Current Outpatient Medications   Medication Instructions   • oseltamivir (TAMIFLU) 75 mg, Oral, 2 TIMES DAILY        ALLERGIES  No Known Allergies    PHYSICAL EXAM  VITAL SIGNS: /85   Pulse 81   Temp 36 °C (96.8 °F) (Temporal)   Resp 16   Ht 1.702 m (5' 7\")   Wt 79.4 kg (175 lb)   SpO2 97%   BMI 27.41 kg/m²     Nursing note and vitals reviewed.  Constitutional: Well-developed and well-nourished. No " "distress.   HENT: Head is normocephalic and atraumatic. Oropharynx is clear and moist without exudate or erythema.   Eyes: Pupils are equal, round, and reactive to light. Injected conjunctiva on right. PERRLA. Anterior chamber is clear. No Foreign body present. Diffuse increased fluorescein uptake along the lower right quarter of the cornea  Cardiovascular: Normal rate and regular rhythm. No murmur heard. Normal radial pulses.  Pulmonary/Chest: Breath sounds normal. No wheezes or rales.   Abdominal: Soft and non-tender. No distention    Musculoskeletal: Extremities exhibit normal range of motion without edema or tenderness.   Neurological: Awake, alert and oriented to person, place, and time. No focal deficits noted.  Skin: Skin is warm and dry. No rash.   Psychiatric: Normal mood and affect. Appropriate for clinical situation.    COURSE & MEDICAL DECISION MAKING  Nursing notes, VS, PMSFHx reviewed in chart.     Review of past medical records shows the patient      7:05 AM - Patient evaluated at bedside. Patient will be treated with Opthaine, erythromycin, and ophthalmic strip 1 mg. Differential diagnoses include but not limited to: corneal abrasion, foreign body. Informed patient about medication administration for pain control. I discussed with patient plan of care following discharge including not wearing contacts. Patient was given opportunity to ask questions at this time. Counseled patient on proper prescription medication dosages for pain control and relief. Patient verbalizes understanding and agrees to care of plan.  Patient will be discharged with a prescription for Erythromycin and a referral to Opthomology. His vital signs prior to discharge: /85   Pulse 81   Temp 36 °C (96.8 °F) (Temporal)   Resp 16   Ht 1.702 m (5' 7\")   Wt 79.4 kg (175 lb)   SpO2 97%   BMI 27.41 kg/m²       The patient will return for new or worsening symptoms and is stable at the time of discharge.    The patient is " referred to a primary physician for blood pressure management, diabetic screening, and for all other preventative health concerns.    DISPOSITION:  Patient will be discharged home in stable condition.    FOLLOW UP:  [facility], Emergency Dept  [address] [address] [phone]    If symptoms worsen      OUTPATIENT MEDICATIONS:  New Prescriptions    ERYTHROMYCIN 5 MG/GM OINTMENT    Apply 1 Application to right eye 4 times a day for 5 days. Apply small amount twice a day          FINAL IMPRESSION  1. Keratitis    2. Pain of right eye          Michael RAMIREZ (Scribe), am scribing for, and in the presence of, Galileo Trujillo M.D..    Electronically signed by: Michael William (Wenibmichelle), 6/10/2022    IGalileo M.D. personally performed the services described in this documentation, as scribed by Michael William in my presence, and it is both accurate and complete.    E    The note accurately reflects work and decisions made by me.  Galileo Trujillo M.D.  6/10/2022  11:09 AM     declines
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
## 2024-03-25 NOTE — H&P ADULT - NS ATTEND AMEND GEN_ALL_CORE FT
I independently performed the documented the history, exam, and medical decision making. I have made amendments to the documentation where necessary. I have personally seen and examined the patient. Medical records were reviewed and I have made amendments to the documentation where necessary and adjusted the history, physical examination, and plan as documented by the Nurse Practitioner. Patient was seen and evaluated at bedside today. Reported no overnight events and is in no acute distress. Eager to participate on the recommended rehabilitation program. Denies any CP, SOB, CAGE, palpitations, fever, chills, body aches, cough, congestion, or any other symptoms at this time. Admission vitals, labs, and physical exam are outlined below.    LAB                        11.3   6.55  )-----------( 179      ( 25 Mar 2024 07:00 )             33.1     03-25    137  |  102  |  13  ----------------------------<  160<H>  4.1   |  28  |  0.96    Ca    8.6      25 Mar 2024 07:00    TPro  6.3  /  Alb  2.7<L>  /  TBili  1.4<H>  /  DBili  x   /  AST  17  /  ALT  16  /  AlkPhos  76  03-25    LIVER FUNCTIONS - ( 25 Mar 2024 07:00 )  Alb: 2.7 g/dL / Pro: 6.3 g/dL / ALK PHOS: 76 U/L / ALT: 16 U/L / AST: 17 U/L / GGT: x      PHYSICAL EXAM  Vital Signs Last 24 Hrs  T(C): 36.7 (26 Mar 2024 06:43), Max: 37.2 (25 Mar 2024 20:40)  T(F): 98 (26 Mar 2024 06:43), Max: 98.9 (25 Mar 2024 20:40)  HR: 68 (26 Mar 2024 06:43) (68 - 71)  BP: 152/74 (26 Mar 2024 06:43) (147/63 - 166/74)  RR: 14 (26 Mar 2024 06:43) (14 - 17)  SpO2: 97% (26 Mar 2024 06:43) (97% - 98%)      Gen - NAD, Comfortable  HEENT - NCAT, EOMI, MMM  Neck - Supple, No limited ROM  Pulm - CTAB, No wheeze, No rhonchi, No crackles  Cardiovascular - RRR, S1S2  Chest - good chest expansion, good respiratory effort  Abdomen - Soft, NT/ND, +BS  Extremities - No Cyanosis, no clubbing, no edema, no calf tenderness, left BKA  Neuro-     Cognitive - awake, alert, oriented to person, place, date, year, and situation. Able  to follow command     Communication - Fluent, Comprehensible     Attention: Intact     Memory: memory intact     Cranial Nerves - CN 2-12 intact. No facial asymmetry, Tongue midline, EOMI, Shoulder shrug intact     Motor -                     LEFT    UE -  5/5                    RIGHT UE -  5/5                    LEFT    LE - HF 2/5 limited 2/2 surgery, BKA                    RIGHT LE -  5/5        Sensory - Intact to LT      Reflexes - DTR Intact, No primitive reflexive     Tone - normal  Psychiatric - Mood stable, Affect WNL  Skin: Left hip surgical incision covered with aquacel dressing. Left BKA stump redness with scab I independently performed the documented the history, exam, and medical decision making. I have made amendments to the documentation where necessary. I have personally seen and examined the patient. Medical records were reviewed and I have made amendments to the documentation where necessary and adjusted the history, physical examination, and plan as documented by the Nurse Practitioner. Patient was seen and evaluated at bedside today. Reported no overnight events and is in no acute distress. Eager to participate on the recommended rehabilitation program. Denies any CP, SOB, CAGE, palpitations, fever, chills, body aches, cough, congestion, or any other symptoms at this time. Admission vitals, labs, and physical exam are outlined below.    LAB                        11.3   6.55  )-----------( 179      ( 25 Mar 2024 07:00 )             33.1     03-25    137  |  102  |  13  ----------------------------<  160<H>  4.1   |  28  |  0.96    Ca    8.6      25 Mar 2024 07:00    TPro  6.3  /  Alb  2.7<L>  /  TBili  1.4<H>  /  DBili  x   /  AST  17  /  ALT  16  /  AlkPhos  76  03-25    LIVER FUNCTIONS - ( 25 Mar 2024 07:00 )  Alb: 2.7 g/dL / Pro: 6.3 g/dL / ALK PHOS: 76 U/L / ALT: 16 U/L / AST: 17 U/L / GGT: x      PHYSICAL EXAM  Vital Signs Last 24 Hrs  T(C): 36.7 (26 Mar 2024 06:43), Max: 37.2 (25 Mar 2024 20:40)  T(F): 98 (26 Mar 2024 06:43), Max: 98.9 (25 Mar 2024 20:40)  HR: 68 (26 Mar 2024 06:43) (68 - 71)  BP: 152/74 (26 Mar 2024 06:43) (147/63 - 166/74)  RR: 14 (26 Mar 2024 06:43) (14 - 17)  SpO2: 97% (26 Mar 2024 06:43) (97% - 98%)      Gen - NAD, Comfortable  HEENT - NCAT, EOMI, MMM  Neck - Supple, No limited ROM  Pulm - CTAB, No wheeze, No rhonchi, No crackles  Cardiovascular - RRR, S1S2  Chest - good chest expansion, good respiratory effort  Abdomen - Soft, NT/ND, +BS  Extremities - No Cyanosis, no clubbing, no edema, no calf tenderness, left BKA  Neuro-     Cognitive - awake, alert, oriented to person, place, date, year, and situation. Able  to follow command     Communication - Fluent, Comprehensible     Attention: Intact     Memory: memory intact     Cranial Nerves - CN 2-12 intact. No facial asymmetry, Tongue midline, EOMI, Shoulder shrug intact     Motor -                     LEFT    UE -  5/5                    RIGHT UE -  5/5                    LEFT    LE - HF 2/5 limited 2/2 surgery, BKA                    RIGHT LE -  5/5        Sensory - Intact to LT      Reflexes - DTR Intact, No primitive reflexive     Tone - normal  Psychiatric - Mood stable, Affect WNL  Skin: Left hip surgical incision covered with Aquacel dressing.  Removed for visual inspection:  - Proximal incision: 5.2 cm with staples, clean, dry, no erythema  - Middle incision: 1.5 cm with staples, clean, dry, no erythema  - Distal incision: 1.5 cm with staples, clean, dry, no erythema  Left BKA residual limb redness with chronic appearing erythematous skin changes with DTIs I independently performed the documented the history, exam, and medical decision making. I have made amendments to the documentation where necessary. I have personally seen and examined the patient. Medical records were reviewed and I have made amendments to the documentation where necessary and adjusted the history, physical examination, and plan as documented by the Nurse Practitioner. Patient was seen and evaluated at bedside today. Reported no overnight events and is in no acute distress. Eager to participate on the recommended rehabilitation program. Denies any CP, SOB, CAGE, palpitations, fever, chills, body aches, cough, congestion, or any other symptoms at this time. Admission vitals, labs, and physical exam are outlined below.    LAB                        11.3   6.55  )-----------( 179      ( 25 Mar 2024 07:00 )             33.1     03-25    137  |  102  |  13  ----------------------------<  160<H>  4.1   |  28  |  0.96    Ca    8.6      25 Mar 2024 07:00    TPro  6.3  /  Alb  2.7<L>  /  TBili  1.4<H>  /  DBili  x   /  AST  17  /  ALT  16  /  AlkPhos  76  03-25    LIVER FUNCTIONS - ( 25 Mar 2024 07:00 )  Alb: 2.7 g/dL / Pro: 6.3 g/dL / ALK PHOS: 76 U/L / ALT: 16 U/L / AST: 17 U/L / GGT: x      PHYSICAL EXAM  Vital Signs Last 24 Hrs  T(C): 36.7 (26 Mar 2024 06:43), Max: 37.2 (25 Mar 2024 20:40)  T(F): 98 (26 Mar 2024 06:43), Max: 98.9 (25 Mar 2024 20:40)  HR: 68 (26 Mar 2024 06:43) (68 - 71)  BP: 152/74 (26 Mar 2024 06:43) (147/63 - 166/74)  RR: 14 (26 Mar 2024 06:43) (14 - 17)  SpO2: 97% (26 Mar 2024 06:43) (97% - 98%)      Gen - NAD, Comfortable  HEENT - NCAT, EOMI, MMM  Neck - Supple, No limited ROM  Pulm - CTAB, No wheeze, No rhonchi, No crackles  Cardiovascular - RRR, S1S2  Chest - good chest expansion, good respiratory effort  Abdomen - Soft, NT/ND, +BS  Extremities - No Cyanosis, no clubbing, no edema, no calf tenderness, left BKA  Neuro-     Cognitive - awake, alert, oriented to person, place, date, year, and situation. Able  to follow command     Communication - Fluent, Comprehensible     Attention: Intact     Memory: memory intact     Cranial Nerves - CN 2-12 intact. No facial asymmetry, Tongue midline, EOMI, Shoulder shrug intact     Motor -                     LEFT    UE -  5/5                    RIGHT UE -  5/5                    LEFT    LE - HF 2/5 limited 2/2 surgery, BKA                    RIGHT LE -  5/5        Sensory - Intact to LT      Reflexes - DTR Intact, No primitive reflexive     Tone - normal  Psychiatric - Mood stable, Affect WNL  Skin: Left hip surgical incision covered with Aquacel dressing.  Removed for visual inspection:  - Proximal incision: 5.2 cm with staples, clean, dry, no erythema  - Middle incision: 1.5 cm with staples, clean, dry, no erythema  - Distal incision: 1.5 cm with staples, clean, dry, no erythema  - Left BKA residual limb redness with chronic appearing erythematous skin changes with pressure induced DTIs in setting of dysvascular chronic injury

## 2024-03-25 NOTE — PATIENT PROFILE ADULT - FALL HARM RISK - HARM RISK INTERVENTIONS
Assistance with ambulation/Assistance OOB with selected safe patient handling equipment/Communicate Risk of Fall with Harm to all staff/Discuss with provider need for PT consult/Monitor gait and stability/Provide patient with walking aids - walker, cane, crutches/Reinforce activity limits and safety measures with patient and family/Review medications for side effects contributing to fall risk/Tailored Fall Risk Interventions/Visual Cue: Yellow wristband and red socks/Bed in lowest position, wheels locked, appropriate side rails in place/Call bell, personal items and telephone in reach/Instruct patient to call for assistance before getting out of bed or chair/Non-slip footwear when patient is out of bed/[place] to call system/Physically safe environment - no spills, clutter or unnecessary equipment/Purposeful Proactive Rounding/Room/bathroom lighting operational, light cord in reach

## 2024-03-25 NOTE — H&P ADULT - NSHPLABSRESULTS_GEN_ALL_CORE
RECENT LABS/IMAGING                        11.3   6.55  )-----------( 179      ( 25 Mar 2024 07:00 )             33.1     03-25    137  |  102  |  13  ----------------------------<  160<H>  4.1   |  28  |  0.96    Ca    8.6      25 Mar 2024 07:00    TPro  6.3  /  Alb  2.7<L>  /  TBili  1.4<H>  /  DBili  x   /  AST  17  /  ALT  16  /  AlkPhos  76  03-25      Urinalysis Basic - ( 25 Mar 2024 07:00 )    Color: x / Appearance: x / SG: x / pH: x  Gluc: 160 mg/dL / Ketone: x  / Bili: x / Urobili: x   Blood: x / Protein: x / Nitrite: x   Leuk Esterase: x / RBC: x / WBC x   Sq Epi: x / Non Sq Epi: x / Bacteria: x    X-ray Femur 1 View, Left (03.21.24 @ 15:32)     IMPRESSION:   No acute bony pathology distally.    X-ray Hip w/ Pelvis 2 or 3 Views, Left (03.21.24 @ 15:31)     IMPRESSION:   Intertrochanteric fracture.    CT 3D Reconstruct w/o Workstation (03.20.24 @ 18:57)     IMPRESSION:  Acute fracture of the proximal left femur, as above. Cortical irregularity along the anterior aspect of the coccyx, which may represent an age-indeterminate minimally displaced fracture.

## 2024-03-25 NOTE — H&P ADULT - NSHPSOCIALHISTORY_GEN_ALL_CORE
Smoking -  EtOH -   Drugs -     Marital status:    Patient lives   PTA: Independent in ADLs and ambulation     CURRENT FUNCTIONAL STATUS  Date:   Bed Mobility:   Transfers:   Gait:   Upper Body Dressing:  Lower Body Dressing:  Toileting:  Bathing: Smoking -  EtOH -   Drugs -     Marital status:    Occupation:     Patient lives in a single family home with his sister. 3 steps to enter with a left ascending railing and subsequent first floor set-up. Pt uses axillary crutches to ambulate at baseline. Also owns a manual WC and power WC.    CURRENT FUNCTIONAL STATUS  Date: 3/25  Bed Mobility: min a, 1 person  Transfers: min a, 1 person  Gait: min a, 1 person, 5ft bed to chair with RW   Upper Body Dressing: CG  Lower Body Dressing: Max A, 1 person  Toileting: Dependent  Bathing: Mod A, 1 person Smoking - daily cigarette smoker 1ppd & weed smoker  EtOH - no alcohol in 42 years    Marital status:    Occupation:     Patient lives in a single family home with his sister. 3 steps to enter with a left ascending railing and subsequent first floor set-up. Pt uses axillary crutches to ambulate at baseline. Also owns a manual WC and power WC.    CURRENT FUNCTIONAL STATUS  Date: 3/25  Bed Mobility: min a, 1 person  Transfers: min a, 1 person  Gait: min a, 1 person, 5ft bed to chair with RW   Upper Body Dressing: CG  Lower Body Dressing: Max A, 1 person  Toileting: Dependent  Bathing: Mod A, 1 person

## 2024-03-25 NOTE — CDI QUERY NOTE - NSCDIOTHERTXTBX_GEN_ALL_CORE_HH
Presents s/p mechanical fall found to have left intertrochanteric hip fracture, s/p ORIF.     3/20/2024 Sodium level 134. IVF NS at 100/hr.   3/21/2024 Sodium level 138    Please provide a diagnosis for low Sodium level if clinically significant    - Hyponatremia  - Other(specify)    Thank you.

## 2024-03-25 NOTE — OCCUPATIONAL THERAPY INITIAL EVALUATION ADULT - PLANNED THERAPY INTERVENTIONS, OT EVAL
ADL retraining/IADL retraining/balance training/bed mobility training/joint mobilization/massage/motor coordination training/neuromuscular re-education/parent/caregiver training.../ROM/strengthening/stretching/transfer training

## 2024-03-25 NOTE — CONSULT NOTE ADULT - ASSESSMENT
1. PT- bed mobility,transfers, gait and balance training  2. OT- ADL'S  3. hip fracture- pain control, DVT px   4. Patient would benefit from acute rehab, needs a multidisciplinary team including PT, OT and speech. Can tolerate 3 hours of therapy a day.  Will follow. 
Acute fracture of the proximal left femur

## 2024-03-25 NOTE — H&P ADULT - NSHPPHYSICALEXAM_GEN_ALL_CORE
PHYSICAL EXAM  VITALS  T(C): 36.7 (03-25-24 @ 13:00), Max: 37.1 (03-24-24 @ 21:04)  HR: 71 (03-25-24 @ 13:00) (71 - 86)  BP: 166/74 (03-25-24 @ 13:00) (136/66 - 166/74)  RR: 17 (03-25-24 @ 13:00) (14 - 17)  SpO2: 98% (03-25-24 @ 13:00) (97% - 98%)    Gen - NAD, Comfortable  HEENT - NCAT, EOMI, MMM  Neck - Supple, No limited ROM  Pulm - CTAB, No wheeze, No rhonchi, No crackles  Cardiovascular - RRR, S1S2  Chest - good chest expansion, good respiratory effort  Abdomen - Soft, NT/ND, +BS  Extremities - No Cyanosis, no clubbing, no edema, no calf tenderness, left BKA  Neuro-     Cognitive - awake, alert, oriented to person, place, date, year, and situation. Able  to follow command     Communication - Fluent, Comprehensible     Attention: Intact     Memory: memory intact     Cranial Nerves - CN 2-12 intact. No facial asymmetry, Tongue midline, EOMI, Shoulder shrug intact     Motor -                     LEFT    UE -  5/5                    RIGHT UE -  5/5                    LEFT    LE - HF 2/5 limited 2/2 surgery, BKA                    RIGHT LE -  5/5        Sensory - Intact to LT      Reflexes - DTR Intact, No primitive reflexive     Tone - normal  Psychiatric - Mood stable, Affect WNL  Skin: Left hip surgical incision covered with aquacel dressing. Left BKA stump redness with scab
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
## 2024-03-25 NOTE — PROGRESS NOTE ADULT - ASSESSMENT
63M now POD#3 s/p L hip TFN  - Analgesics PRN  - PT, recommending AR  - WBAT  - OOB to chair  - DVT prophylaxis

## 2024-03-25 NOTE — PATIENT PROFILE ADULT - OVER THE PAST TWO WEEKS, HAVE YOU FELT LITTLE INTEREST OR PLEASURE IN DOING THINGS?
Quality 226: Preventive Care And Screening: Tobacco Use: Screening And Cessation Intervention: Tobacco Screening not Performed for Medical Reasons
Quality 130: Documentation Of Current Medications In The Medical Record: Current Medications Documented
Quality 431: Preventive Care And Screening: Unhealthy Alcohol Use - Screening: Patient screened for unhealthy alcohol use using a single question and scores less than 2 times per year
Detail Level: Detailed
no

## 2024-03-25 NOTE — DISCHARGE NOTE NURSING/CASE MANAGEMENT/SOCIAL WORK - PATIENT PORTAL LINK FT
You can access the FollowMyHealth Patient Portal offered by [facility] by registering at the following website: [email or web address]. By joining [employer]’s FollowMyHealth portal, you will also be able to view your health information using other applications (apps) compatible with our system.
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
## 2024-03-25 NOTE — PROGRESS NOTE ADULT - PROVIDER SPECIALTY LIST ADULT
Critical Care
Family Medicine
Orthopedics
Critical Care
Critical Care
Orthopedics
Family Medicine
Orthopedics

## 2024-03-25 NOTE — PROGRESS NOTE ADULT - SUBJECTIVE AND OBJECTIVE BOX
INTERVAL HPI/OVERNIGHT EVENTS:  Pt seen and examined at bedside this AM, resting comfortably. No acute events reported overnight. Pt offers no complaints, pain well controlled with current analgesic regimen. Seen by PT today, recommending AR.   Denies fever/chills, chest pain, dyspnea, cough, dizziness.     Vital Signs Last 24 Hrs  T(C): 36.3 (25 Mar 2024 06:05), Max: 37.1 (24 Mar 2024 21:04)  T(F): 97.3 (25 Mar 2024 06:05), Max: 98.7 (24 Mar 2024 21:04)  HR: 86 (25 Mar 2024 06:05) (82 - 86)  BP: 148/72 (25 Mar 2024 06:05) (136/66 - 148/72)  BP(mean): --  RR: 14 (25 Mar 2024 06:05) (14 - 16)  SpO2: 98% (25 Mar 2024 06:05) (97% - 98%)        PHYSICAL EXAM:  GENERAL: awake and alert, NAD  HEAD:  Atraumatic, Normocephalic  EYES: EOMI  CHEST/LUNG: non-labored breathing  HEART: normal HR  EXTREMITIES: L hip soft, no hematoma or ecchymosis, dressings with spotting, incision C/D/I; NAVI pizarro    I&O's Detail    24 Mar 2024 07:01  -  25 Mar 2024 07:00  --------------------------------------------------------  IN:  Total IN: 0 mL    OUT:    Voided (mL): 300 mL  Total OUT: 300 mL    Total NET: -300 mL          LABS:                        11.3   6.55  )-----------( 179      ( 25 Mar 2024 07:00 )             33.1     03-25    137  |  102  |  13  ----------------------------<  160<H>  4.1   |  28  |  0.96    Ca    8.6      25 Mar 2024 07:00    TPro  6.3  /  Alb  2.7<L>  /  TBili  1.4<H>  /  DBili  x   /  AST  17  /  ALT  16  /  AlkPhos  76  03-25      Urinalysis Basic - ( 25 Mar 2024 07:00 )    Color: x / Appearance: x / SG: x / pH: x  Gluc: 160 mg/dL / Ketone: x  / Bili: x / Urobili: x   Blood: x / Protein: x / Nitrite: x   Leuk Esterase: x / RBC: x / WBC x   Sq Epi: x / Non Sq Epi: x / Bacteria: x
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
## 2024-03-25 NOTE — OCCUPATIONAL THERAPY INITIAL EVALUATION ADULT - GENERAL OBSERVATIONS, REHAB EVAL
Pt received supine in bed +PIV. Alert and oriented x4 pt tolerated session well. Pt left supine in bed with lines intact and call bell in reach

## 2024-03-26 LAB
GLUCOSE BLDC GLUCOMTR-MCNC: 165 MG/DL — HIGH (ref 70–99)
GLUCOSE BLDC GLUCOMTR-MCNC: 170 MG/DL — HIGH (ref 70–99)
GLUCOSE BLDC GLUCOMTR-MCNC: 180 MG/DL — HIGH (ref 70–99)
GLUCOSE BLDC GLUCOMTR-MCNC: 180 MG/DL — HIGH (ref 70–99)

## 2024-03-26 PROCEDURE — 99223 1ST HOSP IP/OBS HIGH 75: CPT

## 2024-03-26 RX ADMIN — Medication 975 MILLIGRAM(S): at 00:30

## 2024-03-26 RX ADMIN — Medication 6 MILLIGRAM(S): at 21:10

## 2024-03-26 RX ADMIN — ENOXAPARIN SODIUM 40 MILLIGRAM(S): 100 INJECTION SUBCUTANEOUS at 11:25

## 2024-03-26 RX ADMIN — Medication 975 MILLIGRAM(S): at 06:17

## 2024-03-26 RX ADMIN — TRAMADOL HYDROCHLORIDE 50 MILLIGRAM(S): 50 TABLET ORAL at 06:16

## 2024-03-26 RX ADMIN — TRAMADOL HYDROCHLORIDE 50 MILLIGRAM(S): 50 TABLET ORAL at 22:09

## 2024-03-26 RX ADMIN — Medication 2: at 17:57

## 2024-03-26 RX ADMIN — TRAMADOL HYDROCHLORIDE 50 MILLIGRAM(S): 50 TABLET ORAL at 14:55

## 2024-03-26 RX ADMIN — Medication 10 MILLIGRAM(S): at 06:16

## 2024-03-26 RX ADMIN — PANTOPRAZOLE SODIUM 40 MILLIGRAM(S): 20 TABLET, DELAYED RELEASE ORAL at 06:19

## 2024-03-26 RX ADMIN — TRAMADOL HYDROCHLORIDE 50 MILLIGRAM(S): 50 TABLET ORAL at 02:00

## 2024-03-26 RX ADMIN — Medication 975 MILLIGRAM(S): at 17:25

## 2024-03-26 RX ADMIN — TRAMADOL HYDROCHLORIDE 50 MILLIGRAM(S): 50 TABLET ORAL at 17:26

## 2024-03-26 RX ADMIN — TRAMADOL HYDROCHLORIDE 50 MILLIGRAM(S): 50 TABLET ORAL at 09:21

## 2024-03-26 RX ADMIN — TRAMADOL HYDROCHLORIDE 50 MILLIGRAM(S): 50 TABLET ORAL at 21:09

## 2024-03-26 RX ADMIN — Medication 2: at 12:23

## 2024-03-26 RX ADMIN — Medication 975 MILLIGRAM(S): at 11:19

## 2024-03-26 NOTE — DIETITIAN INITIAL EVALUATION ADULT - NSPROEDALEARNPREF_GEN_A_NUR
Education Provided on Need for Supplementation, Consistent Carbohydrate & Blood Glucose Management/verbal instruction

## 2024-03-26 NOTE — DIETITIAN INITIAL EVALUATION ADULT - PERTINENT LABORATORY DATA
03-25    137  |  102  |  13  ----------------------------<  160<H>  4.1   |  28  |  0.96    Ca    8.6      25 Mar 2024 07:00    TPro  6.3  /  Alb  2.7<L>  /  TBili  1.4<H>  /  DBili  x   /  AST  17  /  ALT  16  /  AlkPhos  76  03-25  POCT Blood Glucose.: 180 mg/dL (03-26-24 @ 07:52)  A1C with Estimated Average Glucose Result: 7.7 % (03-21-24 @ 06:01)

## 2024-03-26 NOTE — DIETITIAN INITIAL EVALUATION ADULT - ADD RECOMMEND
1) Monitor Weights, Intake, Tolerance, Skin, POCT & Labwork  2) Education Provided on Need for Supplementation, Consistent Carbohydrate & Blood Glucose Management   3) Glucerna 8oz PO Daily  4) Continue Nutrition Plan of Care

## 2024-03-26 NOTE — DIETITIAN INITIAL EVALUATION ADULT - BODY MASS INDEX
Zyclara Counseling:  I discussed with the patient the risks of imiquimod including but not limited to erythema, scaling, itching, weeping, crusting, and pain.  Patient understands that the inflammatory response to imiquimod is variable from person to person and was educated regarded proper titration schedule.  If flu-like symptoms develop, patient knows to discontinue the medication and contact us. 31.7

## 2024-03-26 NOTE — DIETITIAN INITIAL EVALUATION ADULT - OTHER INFO
Initial Nutrition Assessment   63yr Old Male   Denies Food Allergy/Intolerance  Tolerates Diet Well - No Chewing/Swallowing Complications (Per Patient)  Surgical Incision on Left Hip & No Pressure Ulcers (as Per Nursing Flow Sheets)  +1 Left Hip Edema Noted (as Per Nursing Flow Sheets)  No Recent Diarrhea/Constipation & Nausea/Vomiting (as Per Patient)
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
## 2024-03-26 NOTE — DIETITIAN NUTRITION RISK NOTIFICATION - TREATMENT: THE FOLLOWING DIET HAS BEEN RECOMMENDED
Recommend Initiate Glucerna 8oz PO Daily (Provides 220kcal-10grams of Protein)   Nutrition Education Provided

## 2024-03-26 NOTE — DIETITIAN INITIAL EVALUATION ADULT - PERTINENT MEDS FT
MEDICATIONS  (STANDING):  acetaminophen     Tablet .. 975 milliGRAM(s) Oral every 6 hours  dextrose 5%. 1000 milliLiter(s) (50 mL/Hr) IV Continuous <Continuous>  dextrose 5%. 1000 milliLiter(s) (100 mL/Hr) IV Continuous <Continuous>  dextrose 50% Injectable 25 Gram(s) IV Push once  dextrose 50% Injectable 12.5 Gram(s) IV Push once  dextrose 50% Injectable 25 Gram(s) IV Push once  enalapril 10 milliGRAM(s) Oral daily  enoxaparin Injectable 40 milliGRAM(s) SubCutaneous every 24 hours  glucagon  Injectable 1 milliGRAM(s) IntraMuscular once  influenza   Vaccine 0.5 milliLiter(s) IntraMuscular once  insulin lispro (ADMELOG) corrective regimen sliding scale   SubCutaneous three times a day before meals  insulin lispro (ADMELOG) corrective regimen sliding scale   SubCutaneous at bedtime  pantoprazole    Tablet 40 milliGRAM(s) Oral before breakfast  polyethylene glycol 3350 17 Gram(s) Oral daily  senna 2 Tablet(s) Oral at bedtime  traMADol 50 milliGRAM(s) Oral every 4 hours    MEDICATIONS  (PRN):  aluminum hydroxide/magnesium hydroxide/simethicone Suspension 30 milliLiter(s) Oral every 4 hours PRN Dyspepsia  dextrose Oral Gel 15 Gram(s) Oral once PRN Blood Glucose LESS THAN 70 milliGRAM(s)/deciliter  melatonin 6 milliGRAM(s) Oral at bedtime PRN Insomnia  traMADol 25 milliGRAM(s) Oral every 6 hours PRN Moderate Pain (4 - 6)

## 2024-03-26 NOTE — DIETITIAN INITIAL EVALUATION ADULT - FACTORS AFF FOOD INTAKE
States Fair Intake over Last Week  States Hasn't Eaten in 5 Days and Last 2 Days Ate Fair Amount  Consuming Far Less than Prior to Admission to Hospital   (Per Patient)/persistent lack of appetite

## 2024-03-26 NOTE — DIETITIAN INITIAL EVALUATION ADULT - EDUCATION DIETARY MODIFICATIONS
Education Provided on Need for Supplementation, Consistent Carbohydrate & Blood Glucose Management/(2) meets goals/outcomes/verbalization

## 2024-03-26 NOTE — DIETITIAN INITIAL EVALUATION ADULT - NS FNS DIET ORDER
Consistent Carbohydrate Diet w/ Thin Liquids (IDDSI Level 0)   Recommend Initiate Glucerna 8oz PO Daily (Provides 220kcal-10grams of Protein)   Education Provided on Need for Supplementation, Consistent Carbohydrate & Blood Glucose Management
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
## 2024-03-26 NOTE — DIETITIAN INITIAL EVALUATION ADULT - ORAL INTAKE PTA/DIET HISTORY
Patient Does Not Follow Diet @Home  Consumes 2 Meals a Day   Usually Cooks For Self  Doesn't Take Vitamin/Supplements @Home

## 2024-03-27 LAB
GLUCOSE BLDC GLUCOMTR-MCNC: 142 MG/DL — HIGH (ref 70–99)
GLUCOSE BLDC GLUCOMTR-MCNC: 167 MG/DL — HIGH (ref 70–99)
GLUCOSE BLDC GLUCOMTR-MCNC: 188 MG/DL — HIGH (ref 70–99)
GLUCOSE BLDC GLUCOMTR-MCNC: 238 MG/DL — HIGH (ref 70–99)

## 2024-03-27 PROCEDURE — 99232 SBSQ HOSP IP/OBS MODERATE 35: CPT

## 2024-03-27 RX ORDER — TRAMADOL HYDROCHLORIDE 50 MG/1
50 TABLET ORAL EVERY 4 HOURS
Refills: 0 | Status: DISCONTINUED | OUTPATIENT
Start: 2024-03-27 | End: 2024-04-01

## 2024-03-27 RX ORDER — TRAMADOL HYDROCHLORIDE 50 MG/1
25 TABLET ORAL EVERY 6 HOURS
Refills: 0 | Status: DISCONTINUED | OUTPATIENT
Start: 2024-03-27 | End: 2024-03-27

## 2024-03-27 RX ADMIN — ENOXAPARIN SODIUM 40 MILLIGRAM(S): 100 INJECTION SUBCUTANEOUS at 12:08

## 2024-03-27 RX ADMIN — PANTOPRAZOLE SODIUM 40 MILLIGRAM(S): 20 TABLET, DELAYED RELEASE ORAL at 05:24

## 2024-03-27 RX ADMIN — TRAMADOL HYDROCHLORIDE 50 MILLIGRAM(S): 50 TABLET ORAL at 05:24

## 2024-03-27 RX ADMIN — Medication 975 MILLIGRAM(S): at 17:20

## 2024-03-27 RX ADMIN — Medication 975 MILLIGRAM(S): at 05:24

## 2024-03-27 RX ADMIN — Medication 975 MILLIGRAM(S): at 00:03

## 2024-03-27 RX ADMIN — SENNA PLUS 2 TABLET(S): 8.6 TABLET ORAL at 22:07

## 2024-03-27 RX ADMIN — Medication 975 MILLIGRAM(S): at 13:04

## 2024-03-27 RX ADMIN — Medication 975 MILLIGRAM(S): at 01:03

## 2024-03-27 RX ADMIN — TRAMADOL HYDROCHLORIDE 50 MILLIGRAM(S): 50 TABLET ORAL at 23:08

## 2024-03-27 RX ADMIN — TRAMADOL HYDROCHLORIDE 50 MILLIGRAM(S): 50 TABLET ORAL at 22:08

## 2024-03-27 RX ADMIN — Medication 10 MILLIGRAM(S): at 05:24

## 2024-03-27 RX ADMIN — Medication 2: at 17:20

## 2024-03-27 RX ADMIN — POLYETHYLENE GLYCOL 3350 17 GRAM(S): 17 POWDER, FOR SOLUTION ORAL at 12:08

## 2024-03-27 RX ADMIN — Medication 975 MILLIGRAM(S): at 06:24

## 2024-03-27 RX ADMIN — TRAMADOL HYDROCHLORIDE 25 MILLIGRAM(S): 50 TABLET ORAL at 09:00

## 2024-03-27 RX ADMIN — TRAMADOL HYDROCHLORIDE 25 MILLIGRAM(S): 50 TABLET ORAL at 08:08

## 2024-03-27 RX ADMIN — Medication 975 MILLIGRAM(S): at 12:08

## 2024-03-27 RX ADMIN — Medication 6 MILLIGRAM(S): at 22:07

## 2024-03-27 RX ADMIN — Medication 2: at 12:07

## 2024-03-27 RX ADMIN — TRAMADOL HYDROCHLORIDE 50 MILLIGRAM(S): 50 TABLET ORAL at 06:24
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
## 2024-03-27 NOTE — PROGRESS NOTE ADULT - SUBJECTIVE AND OBJECTIVE BOX
PROGRESS NOTE:     Patient is a 63y old  Male who presents with a chief complaint of Traumatic left intertrochanteric hip fracture s/p ORIF (26 Mar 2024 16:00)    SUBJECTIVE / OVERNIGHT EVENTS: patient seen and examined. Denies any particular concern today however reports overall not feeling as well as yesterday. Patient also reports pain today. +BM today.     ADDITIONAL REVIEW OF SYSTEMS:    MEDICATIONS  (STANDING):  acetaminophen     Tablet .. 975 milliGRAM(s) Oral every 6 hours  dextrose 5%. 1000 milliLiter(s) (50 mL/Hr) IV Continuous <Continuous>  dextrose 5%. 1000 milliLiter(s) (100 mL/Hr) IV Continuous <Continuous>  dextrose 50% Injectable 25 Gram(s) IV Push once  dextrose 50% Injectable 12.5 Gram(s) IV Push once  dextrose 50% Injectable 25 Gram(s) IV Push once  enalapril 10 milliGRAM(s) Oral daily  enoxaparin Injectable 40 milliGRAM(s) SubCutaneous every 24 hours  glucagon  Injectable 1 milliGRAM(s) IntraMuscular once  influenza   Vaccine 0.5 milliLiter(s) IntraMuscular once  insulin lispro (ADMELOG) corrective regimen sliding scale   SubCutaneous three times a day before meals  insulin lispro (ADMELOG) corrective regimen sliding scale   SubCutaneous at bedtime  pantoprazole    Tablet 40 milliGRAM(s) Oral before breakfast  polyethylene glycol 3350 17 Gram(s) Oral daily  senna 2 Tablet(s) Oral at bedtime  traMADol 50 milliGRAM(s) Oral every 4 hours    MEDICATIONS  (PRN):  aluminum hydroxide/magnesium hydroxide/simethicone Suspension 30 milliLiter(s) Oral every 4 hours PRN Dyspepsia  dextrose Oral Gel 15 Gram(s) Oral once PRN Blood Glucose LESS THAN 70 milliGRAM(s)/deciliter  melatonin 6 milliGRAM(s) Oral at bedtime PRN Insomnia  traMADol 25 milliGRAM(s) Oral every 6 hours PRN Moderate Pain (4 - 6)      CAPILLARY BLOOD GLUCOSE  POCT Blood Glucose.: 188 mg/dL (27 Mar 2024 11:56)  POCT Blood Glucose.: 142 mg/dL (27 Mar 2024 07:48)  POCT Blood Glucose.: 170 mg/dL (26 Mar 2024 21:08)  POCT Blood Glucose.: 165 mg/dL (26 Mar 2024 17:21)    PHYSICAL EXAM:  Vital Signs Last 24 Hrs  T(C): 36.7 (27 Mar 2024 07:28), Max: 37.2 (26 Mar 2024 20:09)  T(F): 98.1 (27 Mar 2024 07:28), Max: 98.9 (26 Mar 2024 20:09)  HR: 70 (27 Mar 2024 07:28) (70 - 79)  BP: 146/62 (27 Mar 2024 07:28) (146/62 - 161/82)  BP(mean): --  RR: 16 (27 Mar 2024 07:28) (16 - 16)  SpO2: 97% (27 Mar 2024 07:28) (97% - 98%)    Parameters below as of 27 Mar 2024 07:28  Patient On (Oxygen Delivery Method): room air        PHYSICAL EXAM:  GENERAL: NAD, well-groomed, well-developed  HEAD:  Atraumatic, Normocephalic  EYES: EOMI, conjunctiva and sclera clear  ENMT: Moist mucous membranes  NECK: Supple, No JVD, Normal thyroid  NERVOUS SYSTEM:  Alert & Oriented X3, Good concentration  CHEST/LUNG: Clear to percussion bilaterally; No rales, rhonchi, wheezing  HEART: Regular rate and rhythm  ABDOMEN: Soft, Nontender, Nondistended; Bowel sounds present  EXTREMITIES: left BKA, left hip incision covered with dressing      RADIOLOGY & ADDITIONAL TESTS:  Results Reviewed:   Imaging Personally Reviewed:  Electrocardiogram Personally Reviewed:    COORDINATION OF CARE:  Care Discussed with Consultants/Other Providers [Y/N]:  Prior or Outpatient Records Reviewed [Y/N]:
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
## 2024-03-28 ENCOUNTER — TRANSCRIPTION ENCOUNTER (OUTPATIENT)
Age: 64
End: 2024-03-28

## 2024-03-28 LAB
ALBUMIN SERPL ELPH-MCNC: 2.7 G/DL — LOW (ref 3.3–5)
ALP SERPL-CCNC: 78 U/L — SIGNIFICANT CHANGE UP (ref 40–120)
ALT FLD-CCNC: 26 U/L — SIGNIFICANT CHANGE UP (ref 10–45)
ANION GAP SERPL CALC-SCNC: 8 MMOL/L — SIGNIFICANT CHANGE UP (ref 5–17)
AST SERPL-CCNC: 33 U/L — SIGNIFICANT CHANGE UP (ref 10–40)
BASOPHILS # BLD AUTO: 0.05 K/UL — SIGNIFICANT CHANGE UP (ref 0–0.2)
BASOPHILS NFR BLD AUTO: 0.7 % — SIGNIFICANT CHANGE UP (ref 0–2)
BILIRUB SERPL-MCNC: 1 MG/DL — SIGNIFICANT CHANGE UP (ref 0.2–1.2)
BUN SERPL-MCNC: 12 MG/DL — SIGNIFICANT CHANGE UP (ref 7–23)
CALCIUM SERPL-MCNC: 8.8 MG/DL — SIGNIFICANT CHANGE UP (ref 8.4–10.5)
CHLORIDE SERPL-SCNC: 102 MMOL/L — SIGNIFICANT CHANGE UP (ref 96–108)
CO2 SERPL-SCNC: 27 MMOL/L — SIGNIFICANT CHANGE UP (ref 22–31)
CREAT SERPL-MCNC: 0.72 MG/DL — SIGNIFICANT CHANGE UP (ref 0.5–1.3)
EGFR: 102 ML/MIN/1.73M2 — SIGNIFICANT CHANGE UP
EOSINOPHIL # BLD AUTO: 0.23 K/UL — SIGNIFICANT CHANGE UP (ref 0–0.5)
EOSINOPHIL NFR BLD AUTO: 3.2 % — SIGNIFICANT CHANGE UP (ref 0–6)
GLUCOSE BLDC GLUCOMTR-MCNC: 159 MG/DL — HIGH (ref 70–99)
GLUCOSE BLDC GLUCOMTR-MCNC: 159 MG/DL — HIGH (ref 70–99)
GLUCOSE BLDC GLUCOMTR-MCNC: 177 MG/DL — HIGH (ref 70–99)
GLUCOSE BLDC GLUCOMTR-MCNC: 198 MG/DL — HIGH (ref 70–99)
GLUCOSE SERPL-MCNC: 170 MG/DL — HIGH (ref 70–99)
HCT VFR BLD CALC: 34.6 % — LOW (ref 39–50)
HGB BLD-MCNC: 12 G/DL — LOW (ref 13–17)
IMM GRANULOCYTES NFR BLD AUTO: 0.3 % — SIGNIFICANT CHANGE UP (ref 0–0.9)
LYMPHOCYTES # BLD AUTO: 1.74 K/UL — SIGNIFICANT CHANGE UP (ref 1–3.3)
LYMPHOCYTES # BLD AUTO: 24.4 % — SIGNIFICANT CHANGE UP (ref 13–44)
MCHC RBC-ENTMCNC: 30.5 PG — SIGNIFICANT CHANGE UP (ref 27–34)
MCHC RBC-ENTMCNC: 34.7 GM/DL — SIGNIFICANT CHANGE UP (ref 32–36)
MCV RBC AUTO: 88 FL — SIGNIFICANT CHANGE UP (ref 80–100)
MONOCYTES # BLD AUTO: 0.66 K/UL — SIGNIFICANT CHANGE UP (ref 0–0.9)
MONOCYTES NFR BLD AUTO: 9.3 % — SIGNIFICANT CHANGE UP (ref 2–14)
NEUTROPHILS # BLD AUTO: 4.43 K/UL — SIGNIFICANT CHANGE UP (ref 1.8–7.4)
NEUTROPHILS NFR BLD AUTO: 62.1 % — SIGNIFICANT CHANGE UP (ref 43–77)
NRBC # BLD: 0 /100 WBCS — SIGNIFICANT CHANGE UP (ref 0–0)
PLATELET # BLD AUTO: 246 K/UL — SIGNIFICANT CHANGE UP (ref 150–400)
POTASSIUM SERPL-MCNC: 4.4 MMOL/L — SIGNIFICANT CHANGE UP (ref 3.5–5.3)
POTASSIUM SERPL-SCNC: 4.4 MMOL/L — SIGNIFICANT CHANGE UP (ref 3.5–5.3)
PROT SERPL-MCNC: 6.5 G/DL — SIGNIFICANT CHANGE UP (ref 6–8.3)
RBC # BLD: 3.93 M/UL — LOW (ref 4.2–5.8)
RBC # FLD: 12.6 % — SIGNIFICANT CHANGE UP (ref 10.3–14.5)
SODIUM SERPL-SCNC: 137 MMOL/L — SIGNIFICANT CHANGE UP (ref 135–145)
WBC # BLD: 7.13 K/UL — SIGNIFICANT CHANGE UP (ref 3.8–10.5)
WBC # FLD AUTO: 7.13 K/UL — SIGNIFICANT CHANGE UP (ref 3.8–10.5)

## 2024-03-28 PROCEDURE — 99232 SBSQ HOSP IP/OBS MODERATE 35: CPT

## 2024-03-28 RX ADMIN — PANTOPRAZOLE SODIUM 40 MILLIGRAM(S): 20 TABLET, DELAYED RELEASE ORAL at 06:18

## 2024-03-28 RX ADMIN — Medication 2: at 08:09

## 2024-03-28 RX ADMIN — Medication 975 MILLIGRAM(S): at 12:21

## 2024-03-28 RX ADMIN — Medication 10 MILLIGRAM(S): at 06:18

## 2024-03-28 RX ADMIN — Medication 975 MILLIGRAM(S): at 13:13

## 2024-03-28 RX ADMIN — Medication 2: at 12:21

## 2024-03-28 RX ADMIN — TRAMADOL HYDROCHLORIDE 50 MILLIGRAM(S): 50 TABLET ORAL at 06:18

## 2024-03-28 RX ADMIN — Medication 6 MILLIGRAM(S): at 22:42

## 2024-03-28 RX ADMIN — Medication 975 MILLIGRAM(S): at 06:18

## 2024-03-28 RX ADMIN — Medication 975 MILLIGRAM(S): at 17:30

## 2024-03-28 RX ADMIN — POLYETHYLENE GLYCOL 3350 17 GRAM(S): 17 POWDER, FOR SOLUTION ORAL at 12:22

## 2024-03-28 RX ADMIN — TRAMADOL HYDROCHLORIDE 50 MILLIGRAM(S): 50 TABLET ORAL at 07:03

## 2024-03-28 RX ADMIN — Medication 975 MILLIGRAM(S): at 18:16

## 2024-03-28 RX ADMIN — Medication 975 MILLIGRAM(S): at 07:03

## 2024-03-28 RX ADMIN — ENOXAPARIN SODIUM 40 MILLIGRAM(S): 100 INJECTION SUBCUTANEOUS at 12:22

## 2024-03-28 RX ADMIN — Medication 2: at 17:30

## 2024-03-28 RX ADMIN — TRAMADOL HYDROCHLORIDE 50 MILLIGRAM(S): 50 TABLET ORAL at 23:54

## 2024-03-28 RX ADMIN — TRAMADOL HYDROCHLORIDE 50 MILLIGRAM(S): 50 TABLET ORAL at 22:43
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
## 2024-03-28 NOTE — PROGRESS NOTE ADULT - SUBJECTIVE AND OBJECTIVE BOX
PROGRESS NOTE:     Patient is a 63y old  Male who presents with a chief complaint of Traumatic left intertrochanteric hip fracture s/p ORIF (26 Mar 2024 16:00)    SUBJECTIVE / OVERNIGHT EVENTS: patient seen and examined. Denies any particular concern today however reports overall not feeling as well as yesterday. Patient also reports pain today. +BM today.     ADDITIONAL REVIEW OF SYSTEMS: Patient seen and examined. Reports pain is always worse in the morning. Says he is trying to avoid taking strong pain medications as he does not want to get addicted. He denies any other medical concerns this morning.     MEDICATIONS  (STANDING):  acetaminophen     Tablet .. 975 milliGRAM(s) Oral every 6 hours  dextrose 5%. 1000 milliLiter(s) (50 mL/Hr) IV Continuous <Continuous>  dextrose 5%. 1000 milliLiter(s) (100 mL/Hr) IV Continuous <Continuous>  dextrose 50% Injectable 25 Gram(s) IV Push once  dextrose 50% Injectable 12.5 Gram(s) IV Push once  dextrose 50% Injectable 25 Gram(s) IV Push once  enalapril 10 milliGRAM(s) Oral daily  enoxaparin Injectable 40 milliGRAM(s) SubCutaneous every 24 hours  glucagon  Injectable 1 milliGRAM(s) IntraMuscular once  influenza   Vaccine 0.5 milliLiter(s) IntraMuscular once  insulin lispro (ADMELOG) corrective regimen sliding scale   SubCutaneous three times a day before meals  insulin lispro (ADMELOG) corrective regimen sliding scale   SubCutaneous at bedtime  pantoprazole    Tablet 40 milliGRAM(s) Oral before breakfast  polyethylene glycol 3350 17 Gram(s) Oral daily  senna 2 Tablet(s) Oral at bedtime  traMADol 50 milliGRAM(s) Oral every 4 hours    MEDICATIONS  (PRN):  aluminum hydroxide/magnesium hydroxide/simethicone Suspension 30 milliLiter(s) Oral every 4 hours PRN Dyspepsia  dextrose Oral Gel 15 Gram(s) Oral once PRN Blood Glucose LESS THAN 70 milliGRAM(s)/deciliter  melatonin 6 milliGRAM(s) Oral at bedtime PRN Insomnia  traMADol 25 milliGRAM(s) Oral every 6 hours PRN Moderate Pain (4 - 6)    CAPILLARY BLOOD GLUCOSE  POCT Blood Glucose.: 177 mg/dL (28 Mar 2024 07:57)  POCT Blood Glucose.: 238 mg/dL (27 Mar 2024 22:05)  POCT Blood Glucose.: 167 mg/dL (27 Mar 2024 16:43)  POCT Blood Glucose.: 188 mg/dL (27 Mar 2024 11:56)        PHYSICAL EXAM:  Vital Signs Last 24 Hrs  T(C): 37.2 (28 Mar 2024 07:57), Max: 37.2 (28 Mar 2024 07:57)  T(F): 98.9 (28 Mar 2024 07:57), Max: 98.9 (28 Mar 2024 07:57)  HR: 69 (28 Mar 2024 07:57) (69 - 85)  BP: 138/83 (28 Mar 2024 07:57) (132/76 - 155/81)  BP(mean): --  RR: 16 (28 Mar 2024 07:57) (16 - 16)  SpO2: 97% (28 Mar 2024 07:57) (97% - 98%)    Parameters below as of 28 Mar 2024 07:57  Patient On (Oxygen Delivery Method): room air        PHYSICAL EXAM:  GENERAL: NAD, well-groomed, well-developed  HEAD:  Atraumatic, Normocephalic  EYES: EOMI, conjunctiva and sclera clear  ENMT: Moist mucous membranes  NECK: Supple, No JVD, Normal thyroid  NERVOUS SYSTEM:  Alert & Oriented X3, Good concentration  CHEST/LUNG: Clear to percussion bilaterally; No rales, rhonchi, wheezing  HEART: Regular rate and rhythm  ABDOMEN: Soft, Nontender, Nondistended; Bowel sounds present  EXTREMITIES: left BKA, left hip incision covered with dressing      RADIOLOGY & ADDITIONAL TESTS:  Results Reviewed:   Imaging Personally Reviewed:  Electrocardiogram Personally Reviewed:    COORDINATION OF CARE:  Care Discussed with Consultants/Other Providers [Y/N]: rehab provider  Prior or Outpatient Records Reviewed [Y/N]:
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
## 2024-03-29 LAB
GLUCOSE BLDC GLUCOMTR-MCNC: 152 MG/DL — HIGH (ref 70–99)
GLUCOSE BLDC GLUCOMTR-MCNC: 186 MG/DL — HIGH (ref 70–99)
GLUCOSE BLDC GLUCOMTR-MCNC: 197 MG/DL — HIGH (ref 70–99)
GLUCOSE BLDC GLUCOMTR-MCNC: 224 MG/DL — HIGH (ref 70–99)

## 2024-03-29 PROCEDURE — 99232 SBSQ HOSP IP/OBS MODERATE 35: CPT

## 2024-03-29 RX ORDER — LACTULOSE 10 G/15ML
20 SOLUTION ORAL ONCE
Refills: 0 | Status: COMPLETED | OUTPATIENT
Start: 2024-03-29 | End: 2024-03-29

## 2024-03-29 RX ORDER — METFORMIN HYDROCHLORIDE 850 MG/1
500 TABLET ORAL
Refills: 0 | Status: DISCONTINUED | OUTPATIENT
Start: 2024-03-29 | End: 2024-04-03

## 2024-03-29 RX ADMIN — Medication 975 MILLIGRAM(S): at 18:02

## 2024-03-29 RX ADMIN — PANTOPRAZOLE SODIUM 40 MILLIGRAM(S): 20 TABLET, DELAYED RELEASE ORAL at 05:58

## 2024-03-29 RX ADMIN — Medication 975 MILLIGRAM(S): at 19:50

## 2024-03-29 RX ADMIN — Medication 975 MILLIGRAM(S): at 05:58

## 2024-03-29 RX ADMIN — Medication 2: at 08:16

## 2024-03-29 RX ADMIN — Medication 4: at 12:32

## 2024-03-29 RX ADMIN — Medication 2: at 17:26

## 2024-03-29 RX ADMIN — METFORMIN HYDROCHLORIDE 500 MILLIGRAM(S): 850 TABLET ORAL at 18:03

## 2024-03-29 RX ADMIN — Medication 10 MILLIGRAM(S): at 05:58

## 2024-03-29 RX ADMIN — Medication 975 MILLIGRAM(S): at 13:10

## 2024-03-29 RX ADMIN — LACTULOSE 20 GRAM(S): 10 SOLUTION ORAL at 13:54

## 2024-03-29 RX ADMIN — ENOXAPARIN SODIUM 40 MILLIGRAM(S): 100 INJECTION SUBCUTANEOUS at 12:32

## 2024-03-29 RX ADMIN — Medication 975 MILLIGRAM(S): at 12:27
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
## 2024-03-29 NOTE — PROGRESS NOTE ADULT - SUBJECTIVE AND OBJECTIVE BOX
PROGRESS NOTE:     Patient is a 63y old  Male who presents with a chief complaint of Traumatic left intertrochanteric hip fracture s/p ORIF (26 Mar 2024 16:00)    SUBJECTIVE / OVERNIGHT EVENTS: patient seen and examined. Reports constipation. Also reports that at home he takes metformin 1000BID. Since FS have been elevated, he is willing to restart metformin. Will start 500BID now and titrate up to 1000BID to avoid GI issues. Patient is agreeable.     ADDITIONAL REVIEW OF SYSTEMS:   MEDICATIONS  (STANDING):  acetaminophen     Tablet .. 975 milliGRAM(s) Oral every 6 hours  dextrose 5%. 1000 milliLiter(s) (50 mL/Hr) IV Continuous <Continuous>  dextrose 5%. 1000 milliLiter(s) (100 mL/Hr) IV Continuous <Continuous>  dextrose 50% Injectable 25 Gram(s) IV Push once  dextrose 50% Injectable 12.5 Gram(s) IV Push once  dextrose 50% Injectable 25 Gram(s) IV Push once  enalapril 10 milliGRAM(s) Oral daily  enoxaparin Injectable 40 milliGRAM(s) SubCutaneous every 24 hours  glucagon  Injectable 1 milliGRAM(s) IntraMuscular once  influenza   Vaccine 0.5 milliLiter(s) IntraMuscular once  insulin lispro (ADMELOG) corrective regimen sliding scale   SubCutaneous three times a day before meals  insulin lispro (ADMELOG) corrective regimen sliding scale   SubCutaneous at bedtime  pantoprazole    Tablet 40 milliGRAM(s) Oral before breakfast  polyethylene glycol 3350 17 Gram(s) Oral daily  senna 2 Tablet(s) Oral at bedtime  traMADol 50 milliGRAM(s) Oral every 4 hours    MEDICATIONS  (PRN):  aluminum hydroxide/magnesium hydroxide/simethicone Suspension 30 milliLiter(s) Oral every 4 hours PRN Dyspepsia  dextrose Oral Gel 15 Gram(s) Oral once PRN Blood Glucose LESS THAN 70 milliGRAM(s)/deciliter  melatonin 6 milliGRAM(s) Oral at bedtime PRN Insomnia  traMADol 25 milliGRAM(s) Oral every 6 hours PRN Moderate Pain (4 - 6)    CAPILLARY BLOOD GLUCOSE  POCT Blood Glucose.: 224 mg/dL (29 Mar 2024 12:12)  POCT Blood Glucose.: 152 mg/dL (29 Mar 2024 08:07)  POCT Blood Glucose.: 198 mg/dL (28 Mar 2024 22:38)  POCT Blood Glucose.: 159 mg/dL (28 Mar 2024 17:28)      PHYSICAL EXAM:  Vital Signs Last 24 Hrs  T(C): 36.5 (29 Mar 2024 09:04), Max: 36.7 (28 Mar 2024 20:13)  T(F): 97.7 (29 Mar 2024 09:04), Max: 98 (28 Mar 2024 20:13)  HR: 72 (29 Mar 2024 09:04) (65 - 75)  BP: 135/78 (29 Mar 2024 09:04) (132/78 - 176/76)  BP(mean): --  RR: 16 (29 Mar 2024 09:04) (16 - 17)  SpO2: 99% (29 Mar 2024 09:04) (98% - 99%)    Parameters below as of 29 Mar 2024 09:04  Patient On (Oxygen Delivery Method): room air      PHYSICAL EXAM:  GENERAL: NAD, well-groomed, well-developed  HEAD:  Atraumatic, Normocephalic  EYES: EOMI, conjunctiva and sclera clear  ENMT: Moist mucous membranes  NECK: Supple, No JVD, Normal thyroid  NERVOUS SYSTEM:  Alert & Oriented X3, Good concentration  CHEST/LUNG: Clear to percussion bilaterally; No rales, rhonchi, wheezing  HEART: Regular rate and rhythm  ABDOMEN: Soft, Nontender, Nondistended; Bowel sounds present  EXTREMITIES: left BKA, left hip incision covered with dressing      RADIOLOGY & ADDITIONAL TESTS:  Results Reviewed:   Imaging Personally Reviewed:  Electrocardiogram Personally Reviewed:    COORDINATION OF CARE:  Care Discussed with Consultants/Other Providers [Y/N]: rehab provider  Prior or Outpatient Records Reviewed [Y/N]:

## 2024-03-30 LAB
GLUCOSE BLDC GLUCOMTR-MCNC: 164 MG/DL — HIGH (ref 70–99)
GLUCOSE BLDC GLUCOMTR-MCNC: 166 MG/DL — HIGH (ref 70–99)
GLUCOSE BLDC GLUCOMTR-MCNC: 177 MG/DL — HIGH (ref 70–99)
GLUCOSE BLDC GLUCOMTR-MCNC: 194 MG/DL — HIGH (ref 70–99)

## 2024-03-30 PROCEDURE — 99232 SBSQ HOSP IP/OBS MODERATE 35: CPT

## 2024-03-30 RX ORDER — ASCORBIC ACID 60 MG
500 TABLET,CHEWABLE ORAL DAILY
Refills: 0 | Status: DISCONTINUED | OUTPATIENT
Start: 2024-03-30 | End: 2024-04-05

## 2024-03-30 RX ADMIN — Medication 975 MILLIGRAM(S): at 17:13

## 2024-03-30 RX ADMIN — Medication 975 MILLIGRAM(S): at 17:43

## 2024-03-30 RX ADMIN — PANTOPRAZOLE SODIUM 40 MILLIGRAM(S): 20 TABLET, DELAYED RELEASE ORAL at 06:04

## 2024-03-30 RX ADMIN — Medication 6 MILLIGRAM(S): at 00:00

## 2024-03-30 RX ADMIN — ENOXAPARIN SODIUM 40 MILLIGRAM(S): 100 INJECTION SUBCUTANEOUS at 11:41

## 2024-03-30 RX ADMIN — POLYETHYLENE GLYCOL 3350 17 GRAM(S): 17 POWDER, FOR SOLUTION ORAL at 11:41

## 2024-03-30 RX ADMIN — METFORMIN HYDROCHLORIDE 500 MILLIGRAM(S): 850 TABLET ORAL at 08:40

## 2024-03-30 RX ADMIN — Medication 6 MILLIGRAM(S): at 23:00

## 2024-03-30 RX ADMIN — Medication 2: at 12:21

## 2024-03-30 RX ADMIN — Medication 975 MILLIGRAM(S): at 12:11

## 2024-03-30 RX ADMIN — Medication 975 MILLIGRAM(S): at 06:00

## 2024-03-30 RX ADMIN — Medication 975 MILLIGRAM(S): at 00:00

## 2024-03-30 RX ADMIN — Medication 10 MILLIGRAM(S): at 06:00

## 2024-03-30 RX ADMIN — Medication 2: at 08:13

## 2024-03-30 RX ADMIN — Medication 975 MILLIGRAM(S): at 11:41

## 2024-03-30 RX ADMIN — Medication 2: at 17:15

## 2024-03-30 RX ADMIN — Medication 975 MILLIGRAM(S): at 06:01

## 2024-03-30 RX ADMIN — Medication 975 MILLIGRAM(S): at 02:43

## 2024-03-30 RX ADMIN — METFORMIN HYDROCHLORIDE 500 MILLIGRAM(S): 850 TABLET ORAL at 17:13
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
## 2024-03-30 NOTE — PROGRESS NOTE ADULT - SUBJECTIVE AND OBJECTIVE BOX
PROGRESS NOTE:     Patient is a 63y old  Male who presents with a chief complaint of Traumatic left intertrochanteric hip fracture s/p ORIF (29 Mar 2024 16:04)          SUBJECTIVE & OBJECTIVE:   Pt seen and examined at bedside in AM. c/o left hip soreness. seen post therapy session    no overnight events.     REVIEW OF SYSTEMS: remaining ROS negative     PHYSICAL EXAM:  VITALS:  Vital Signs Last 24 Hrs  T(C): 36.9 (30 Mar 2024 07:49), Max: 36.9 (30 Mar 2024 07:49)  T(F): 98.4 (30 Mar 2024 07:49), Max: 98.4 (30 Mar 2024 07:49)  HR: 98 (30 Mar 2024 11:14) (77 - 98)  BP: 130/75 (30 Mar 2024 11:14) (130/75 - 167/80)  BP(mean): --  RR: 16 (30 Mar 2024 07:49) (16 - 16)  SpO2: 97% (30 Mar 2024 07:49) (97% - 97%)    Parameters below as of 30 Mar 2024 07:49  Patient On (Oxygen Delivery Method): room air          GENERAL: NAD,  no increased WOB  HEAD:  Atraumatic, Normocephalic  EYES: EOMI, conjunctiva and sclera clear  ENMT: Moist mucous membranes  NECK: Supple, No JVD  NERVOUS SYSTEM:  Alert & Oriented   CHEST/LUNG: Clear to auscultation bilaterally; No rales, rhonchi, wheezing  HEART: Regular rate and rhythm  ABDOMEN: Soft, Nontender, Nondistended; Bowel sounds present  EXTREMITIES:  Left BKA      MEDICATIONS  (STANDING):  acetaminophen     Tablet .. 975 milliGRAM(s) Oral every 6 hours  dextrose 5%. 1000 milliLiter(s) (50 mL/Hr) IV Continuous <Continuous>  dextrose 5%. 1000 milliLiter(s) (100 mL/Hr) IV Continuous <Continuous>  dextrose 50% Injectable 25 Gram(s) IV Push once  dextrose 50% Injectable 12.5 Gram(s) IV Push once  dextrose 50% Injectable 25 Gram(s) IV Push once  enalapril 10 milliGRAM(s) Oral daily  enoxaparin Injectable 40 milliGRAM(s) SubCutaneous every 24 hours  glucagon  Injectable 1 milliGRAM(s) IntraMuscular once  influenza   Vaccine 0.5 milliLiter(s) IntraMuscular once  insulin lispro (ADMELOG) corrective regimen sliding scale   SubCutaneous three times a day before meals  insulin lispro (ADMELOG) corrective regimen sliding scale   SubCutaneous at bedtime  metFORMIN 500 milliGRAM(s) Oral two times a day  pantoprazole    Tablet 40 milliGRAM(s) Oral before breakfast  polyethylene glycol 3350 17 Gram(s) Oral daily  senna 2 Tablet(s) Oral at bedtime    MEDICATIONS  (PRN):  aluminum hydroxide/magnesium hydroxide/simethicone Suspension 30 milliLiter(s) Oral every 4 hours PRN Dyspepsia  bisacodyl Suppository 10 milliGRAM(s) Rectal daily PRN Constipation  dextrose Oral Gel 15 Gram(s) Oral once PRN Blood Glucose LESS THAN 70 milliGRAM(s)/deciliter  melatonin 6 milliGRAM(s) Oral at bedtime PRN Insomnia  traMADol 50 milliGRAM(s) Oral every 4 hours PRN Severe Pain (7 - 10)  traMADol 25 milliGRAM(s) Oral every 6 hours PRN Moderate Pain (4 - 6)      Allergies    No Known Allergies    Intolerances              LABS:                 CAPILLARY BLOOD GLUCOSE      POCT Blood Glucose.: 177 mg/dL (30 Mar 2024 08:05)  POCT Blood Glucose.: 197 mg/dL (29 Mar 2024 21:17)  POCT Blood Glucose.: 186 mg/dL (29 Mar 2024 17:20)                RECENT CULTURES:          RADIOLOGY & ADDITIONAL TESTS:    Care Discussed with Consultants/Other Providers [Y/N]: rehab provider

## 2024-03-31 LAB
GLUCOSE BLDC GLUCOMTR-MCNC: 188 MG/DL — HIGH (ref 70–99)
GLUCOSE BLDC GLUCOMTR-MCNC: 190 MG/DL — HIGH (ref 70–99)
GLUCOSE BLDC GLUCOMTR-MCNC: 195 MG/DL — HIGH (ref 70–99)
GLUCOSE BLDC GLUCOMTR-MCNC: 204 MG/DL — HIGH (ref 70–99)

## 2024-03-31 PROCEDURE — 99232 SBSQ HOSP IP/OBS MODERATE 35: CPT

## 2024-03-31 RX ORDER — AMLODIPINE BESYLATE 2.5 MG/1
2.5 TABLET ORAL AT BEDTIME
Refills: 0 | Status: DISCONTINUED | OUTPATIENT
Start: 2024-03-31 | End: 2024-04-01

## 2024-03-31 RX ADMIN — Medication 975 MILLIGRAM(S): at 07:03

## 2024-03-31 RX ADMIN — Medication 975 MILLIGRAM(S): at 17:30

## 2024-03-31 RX ADMIN — Medication 975 MILLIGRAM(S): at 05:55

## 2024-03-31 RX ADMIN — Medication 4: at 18:18

## 2024-03-31 RX ADMIN — Medication 975 MILLIGRAM(S): at 11:40

## 2024-03-31 RX ADMIN — Medication 2: at 11:45

## 2024-03-31 RX ADMIN — METFORMIN HYDROCHLORIDE 500 MILLIGRAM(S): 850 TABLET ORAL at 16:59

## 2024-03-31 RX ADMIN — Medication 975 MILLIGRAM(S): at 23:04

## 2024-03-31 RX ADMIN — Medication 975 MILLIGRAM(S): at 06:08

## 2024-03-31 RX ADMIN — Medication 975 MILLIGRAM(S): at 16:58

## 2024-03-31 RX ADMIN — Medication 975 MILLIGRAM(S): at 11:10

## 2024-03-31 RX ADMIN — METFORMIN HYDROCHLORIDE 500 MILLIGRAM(S): 850 TABLET ORAL at 08:15

## 2024-03-31 RX ADMIN — Medication 2: at 08:15

## 2024-03-31 RX ADMIN — Medication 6 MILLIGRAM(S): at 23:04

## 2024-03-31 RX ADMIN — ENOXAPARIN SODIUM 40 MILLIGRAM(S): 100 INJECTION SUBCUTANEOUS at 11:12

## 2024-03-31 RX ADMIN — AMLODIPINE BESYLATE 2.5 MILLIGRAM(S): 2.5 TABLET ORAL at 22:03

## 2024-03-31 RX ADMIN — Medication 1 TABLET(S): at 11:12

## 2024-03-31 RX ADMIN — TRAMADOL HYDROCHLORIDE 50 MILLIGRAM(S): 50 TABLET ORAL at 06:08

## 2024-03-31 RX ADMIN — Medication 500 MILLIGRAM(S): at 11:13

## 2024-03-31 RX ADMIN — Medication 10 MILLIGRAM(S): at 06:08

## 2024-03-31 RX ADMIN — PANTOPRAZOLE SODIUM 40 MILLIGRAM(S): 20 TABLET, DELAYED RELEASE ORAL at 06:09
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
## 2024-03-31 NOTE — PROGRESS NOTE ADULT - SUBJECTIVE AND OBJECTIVE BOX
PROGRESS NOTE:     Patient is a 63y old  Male who presents with a chief complaint of Traumatic left intertrochanteric hip fracture s/p ORIF (29 Mar 2024 16:04)          SUBJECTIVE & OBJECTIVE:   Pt seen and examined at bedside in AM.  no overnight events.     REVIEW OF SYSTEMS: remaining ROS negative     PHYSICAL EXAM:  VITALS:  Vital Signs Last 24 Hrs  T(C): 37.3 (31 Mar 2024 08:13), Max: 37.3 (31 Mar 2024 08:13)  T(F): 99.1 (31 Mar 2024 08:13), Max: 99.1 (31 Mar 2024 08:13)  HR: 71 (31 Mar 2024 08:13) (71 - 89)  BP: 146/70 (31 Mar 2024 08:13) (123/75 - 167/87)  BP(mean): --  RR: 16 (31 Mar 2024 08:13) (16 - 17)  SpO2: 97% (31 Mar 2024 08:13) (97% - 98%)    Parameters below as of 31 Mar 2024 08:13  Patient On (Oxygen Delivery Method): room air          GENERAL: NAD,  no increased WOB  HEAD:  Atraumatic, Normocephalic  EYES: EOMI, conjunctiva and sclera clear  ENMT: Moist mucous membranes  NECK: Supple, No JVD  NERVOUS SYSTEM:  Alert & Oriented   CHEST/LUNG: Clear to auscultation bilaterally; No rales, rhonchi, wheezing  HEART: Regular rate and rhythm  ABDOMEN: Soft, Nontender, Nondistended; Bowel sounds present  EXTREMITIES:  Left BKA      MEDICATIONS  (STANDING):  acetaminophen     Tablet .. 975 milliGRAM(s) Oral every 6 hours  amLODIPine   Tablet 2.5 milliGRAM(s) Oral at bedtime  ascorbic acid 500 milliGRAM(s) Oral daily  dextrose 5%. 1000 milliLiter(s) (50 mL/Hr) IV Continuous <Continuous>  dextrose 5%. 1000 milliLiter(s) (100 mL/Hr) IV Continuous <Continuous>  dextrose 50% Injectable 25 Gram(s) IV Push once  dextrose 50% Injectable 12.5 Gram(s) IV Push once  dextrose 50% Injectable 25 Gram(s) IV Push once  enalapril 10 milliGRAM(s) Oral daily  enoxaparin Injectable 40 milliGRAM(s) SubCutaneous every 24 hours  glucagon  Injectable 1 milliGRAM(s) IntraMuscular once  influenza   Vaccine 0.5 milliLiter(s) IntraMuscular once  insulin lispro (ADMELOG) corrective regimen sliding scale   SubCutaneous three times a day before meals  insulin lispro (ADMELOG) corrective regimen sliding scale   SubCutaneous at bedtime  metFORMIN 500 milliGRAM(s) Oral two times a day  multivitamin 1 Tablet(s) Oral daily  pantoprazole    Tablet 40 milliGRAM(s) Oral before breakfast  polyethylene glycol 3350 17 Gram(s) Oral daily  senna 2 Tablet(s) Oral at bedtime    MEDICATIONS  (PRN):  aluminum hydroxide/magnesium hydroxide/simethicone Suspension 30 milliLiter(s) Oral every 4 hours PRN Dyspepsia  bisacodyl Suppository 10 milliGRAM(s) Rectal daily PRN Constipation  dextrose Oral Gel 15 Gram(s) Oral once PRN Blood Glucose LESS THAN 70 milliGRAM(s)/deciliter  melatonin 6 milliGRAM(s) Oral at bedtime PRN Insomnia  traMADol 50 milliGRAM(s) Oral every 4 hours PRN Severe Pain (7 - 10)  traMADol 25 milliGRAM(s) Oral every 6 hours PRN Moderate Pain (4 - 6)        Allergies    No Known Allergies    Intolerances              LABS:               CAPILLARY BLOOD GLUCOSE      POCT Blood Glucose.: 195 mg/dL (31 Mar 2024 11:43)  POCT Blood Glucose.: 190 mg/dL (31 Mar 2024 08:11)  POCT Blood Glucose.: 166 mg/dL (30 Mar 2024 22:53)  POCT Blood Glucose.: 164 mg/dL (30 Mar 2024 17:01)  POCT Blood Glucose.: 194 mg/dL (30 Mar 2024 12:19)                  RECENT CULTURES:          RADIOLOGY & ADDITIONAL TESTS:    Care Discussed with Consultants/Other Providers [Y/N]: rehab provider
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
## 2024-04-01 LAB
ALBUMIN SERPL ELPH-MCNC: 2.8 G/DL — LOW (ref 3.3–5)
ALP SERPL-CCNC: 92 U/L — SIGNIFICANT CHANGE UP (ref 40–120)
ALT FLD-CCNC: 27 U/L — SIGNIFICANT CHANGE UP (ref 10–45)
ANION GAP SERPL CALC-SCNC: 9 MMOL/L — SIGNIFICANT CHANGE UP (ref 5–17)
AST SERPL-CCNC: 14 U/L — SIGNIFICANT CHANGE UP (ref 10–40)
BASOPHILS # BLD AUTO: 0.09 K/UL — SIGNIFICANT CHANGE UP (ref 0–0.2)
BASOPHILS NFR BLD AUTO: 1 % — SIGNIFICANT CHANGE UP (ref 0–2)
BILIRUB SERPL-MCNC: 0.5 MG/DL — SIGNIFICANT CHANGE UP (ref 0.2–1.2)
BUN SERPL-MCNC: 20 MG/DL — SIGNIFICANT CHANGE UP (ref 7–23)
CALCIUM SERPL-MCNC: 8.8 MG/DL — SIGNIFICANT CHANGE UP (ref 8.4–10.5)
CHLORIDE SERPL-SCNC: 104 MMOL/L — SIGNIFICANT CHANGE UP (ref 96–108)
CO2 SERPL-SCNC: 25 MMOL/L — SIGNIFICANT CHANGE UP (ref 22–31)
CREAT SERPL-MCNC: 0.88 MG/DL — SIGNIFICANT CHANGE UP (ref 0.5–1.3)
EGFR: 96 ML/MIN/1.73M2 — SIGNIFICANT CHANGE UP
EOSINOPHIL # BLD AUTO: 0.33 K/UL — SIGNIFICANT CHANGE UP (ref 0–0.5)
EOSINOPHIL NFR BLD AUTO: 3.5 % — SIGNIFICANT CHANGE UP (ref 0–6)
GLUCOSE BLDC GLUCOMTR-MCNC: 161 MG/DL — HIGH (ref 70–99)
GLUCOSE BLDC GLUCOMTR-MCNC: 178 MG/DL — HIGH (ref 70–99)
GLUCOSE BLDC GLUCOMTR-MCNC: 199 MG/DL — HIGH (ref 70–99)
GLUCOSE BLDC GLUCOMTR-MCNC: 220 MG/DL — HIGH (ref 70–99)
GLUCOSE SERPL-MCNC: 190 MG/DL — HIGH (ref 70–99)
HCT VFR BLD CALC: 33.9 % — LOW (ref 39–50)
HGB BLD-MCNC: 11.2 G/DL — LOW (ref 13–17)
IMM GRANULOCYTES NFR BLD AUTO: 0.5 % — SIGNIFICANT CHANGE UP (ref 0–0.9)
LYMPHOCYTES # BLD AUTO: 2.65 K/UL — SIGNIFICANT CHANGE UP (ref 1–3.3)
LYMPHOCYTES # BLD AUTO: 28.5 % — SIGNIFICANT CHANGE UP (ref 13–44)
MCHC RBC-ENTMCNC: 29.9 PG — SIGNIFICANT CHANGE UP (ref 27–34)
MCHC RBC-ENTMCNC: 33 GM/DL — SIGNIFICANT CHANGE UP (ref 32–36)
MCV RBC AUTO: 90.6 FL — SIGNIFICANT CHANGE UP (ref 80–100)
MONOCYTES # BLD AUTO: 0.82 K/UL — SIGNIFICANT CHANGE UP (ref 0–0.9)
MONOCYTES NFR BLD AUTO: 8.8 % — SIGNIFICANT CHANGE UP (ref 2–14)
NEUTROPHILS # BLD AUTO: 5.36 K/UL — SIGNIFICANT CHANGE UP (ref 1.8–7.4)
NEUTROPHILS NFR BLD AUTO: 57.7 % — SIGNIFICANT CHANGE UP (ref 43–77)
NRBC # BLD: 0 /100 WBCS — SIGNIFICANT CHANGE UP (ref 0–0)
PLATELET # BLD AUTO: 269 K/UL — SIGNIFICANT CHANGE UP (ref 150–400)
POTASSIUM SERPL-MCNC: 4.4 MMOL/L — SIGNIFICANT CHANGE UP (ref 3.5–5.3)
POTASSIUM SERPL-SCNC: 4.4 MMOL/L — SIGNIFICANT CHANGE UP (ref 3.5–5.3)
PROT SERPL-MCNC: 6.5 G/DL — SIGNIFICANT CHANGE UP (ref 6–8.3)
RBC # BLD: 3.74 M/UL — LOW (ref 4.2–5.8)
RBC # FLD: 13.4 % — SIGNIFICANT CHANGE UP (ref 10.3–14.5)
SODIUM SERPL-SCNC: 138 MMOL/L — SIGNIFICANT CHANGE UP (ref 135–145)
WBC # BLD: 9.3 K/UL — SIGNIFICANT CHANGE UP (ref 3.8–10.5)
WBC # FLD AUTO: 9.3 K/UL — SIGNIFICANT CHANGE UP (ref 3.8–10.5)

## 2024-04-01 PROCEDURE — 99232 SBSQ HOSP IP/OBS MODERATE 35: CPT

## 2024-04-01 RX ORDER — AMLODIPINE BESYLATE 2.5 MG/1
5 TABLET ORAL AT BEDTIME
Refills: 0 | Status: DISCONTINUED | OUTPATIENT
Start: 2024-04-01 | End: 2024-04-05

## 2024-04-01 RX ADMIN — PANTOPRAZOLE SODIUM 40 MILLIGRAM(S): 20 TABLET, DELAYED RELEASE ORAL at 05:22

## 2024-04-01 RX ADMIN — Medication 975 MILLIGRAM(S): at 11:20

## 2024-04-01 RX ADMIN — Medication 500 MILLIGRAM(S): at 11:21

## 2024-04-01 RX ADMIN — Medication 975 MILLIGRAM(S): at 18:39

## 2024-04-01 RX ADMIN — Medication 975 MILLIGRAM(S): at 11:50

## 2024-04-01 RX ADMIN — Medication 975 MILLIGRAM(S): at 05:22

## 2024-04-01 RX ADMIN — AMLODIPINE BESYLATE 5 MILLIGRAM(S): 2.5 TABLET ORAL at 23:13

## 2024-04-01 RX ADMIN — METFORMIN HYDROCHLORIDE 500 MILLIGRAM(S): 850 TABLET ORAL at 08:15

## 2024-04-01 RX ADMIN — Medication 10 MILLIGRAM(S): at 05:22

## 2024-04-01 RX ADMIN — Medication 1 TABLET(S): at 11:21

## 2024-04-01 RX ADMIN — ENOXAPARIN SODIUM 40 MILLIGRAM(S): 100 INJECTION SUBCUTANEOUS at 11:21

## 2024-04-01 RX ADMIN — Medication 2: at 17:48

## 2024-04-01 RX ADMIN — Medication 2: at 08:17

## 2024-04-01 RX ADMIN — Medication 6 MILLIGRAM(S): at 23:41

## 2024-04-01 RX ADMIN — Medication 975 MILLIGRAM(S): at 17:49

## 2024-04-01 RX ADMIN — TRAMADOL HYDROCHLORIDE 50 MILLIGRAM(S): 50 TABLET ORAL at 09:25

## 2024-04-01 RX ADMIN — TRAMADOL HYDROCHLORIDE 50 MILLIGRAM(S): 50 TABLET ORAL at 10:00

## 2024-04-01 RX ADMIN — METFORMIN HYDROCHLORIDE 500 MILLIGRAM(S): 850 TABLET ORAL at 17:49

## 2024-04-01 RX ADMIN — Medication 2: at 11:50

## 2024-04-01 RX ADMIN — Medication 975 MILLIGRAM(S): at 00:04

## 2024-04-01 RX ADMIN — Medication 975 MILLIGRAM(S): at 06:22

## 2024-04-01 RX ADMIN — Medication 975 MILLIGRAM(S): at 23:40
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
## 2024-04-01 NOTE — PROGRESS NOTE ADULT - SUBJECTIVE AND OBJECTIVE BOX
PROGRESS NOTE:     Patient is a 63y old  Male who presents with a chief complaint of Traumatic left intertrochanteric hip fracture s/p ORIF (29 Mar 2024 16:04)          SUBJECTIVE & OBJECTIVE:   Pt seen and examined at bedside in AM.  no overnight events.     REVIEW OF SYSTEMS: remaining ROS negative     PHYSICAL EXAM:  VITALS:  Vital Signs Last 24 Hrs  T(C): 36.9 (01 Apr 2024 08:36), Max: 37 (31 Mar 2024 19:40)  T(F): 98.5 (01 Apr 2024 08:36), Max: 98.6 (31 Mar 2024 19:40)  HR: 78 (01 Apr 2024 08:36) (62 - 79)  BP: 158/69 (01 Apr 2024 08:36) (133/97 - 158/77)  BP(mean): --  RR: 18 (01 Apr 2024 08:36) (17 - 18)  SpO2: 98% (01 Apr 2024 08:36) (97% - 98%)    Parameters below as of 01 Apr 2024 08:36  Patient On (Oxygen Delivery Method): room air          GENERAL: NAD,  no increased WOB  HEAD:  Atraumatic, Normocephalic  EYES: EOMI, conjunctiva and sclera clear  ENMT: Moist mucous membranes  NECK: Supple, No JVD  NERVOUS SYSTEM:  Alert & Oriented   CHEST/LUNG: Clear to auscultation bilaterally; No rales, rhonchi, wheezing  HEART: Regular rate and rhythm  ABDOMEN: Soft, Nontender, Nondistended; Bowel sounds present  EXTREMITIES:  Left BKA      MEDICATIONS  (STANDING):  acetaminophen     Tablet .. 975 milliGRAM(s) Oral every 6 hours  amLODIPine   Tablet 5 milliGRAM(s) Oral at bedtime  ascorbic acid 500 milliGRAM(s) Oral daily  dextrose 5%. 1000 milliLiter(s) (50 mL/Hr) IV Continuous <Continuous>  dextrose 5%. 1000 milliLiter(s) (100 mL/Hr) IV Continuous <Continuous>  dextrose 50% Injectable 25 Gram(s) IV Push once  dextrose 50% Injectable 12.5 Gram(s) IV Push once  dextrose 50% Injectable 25 Gram(s) IV Push once  enalapril 10 milliGRAM(s) Oral daily  enoxaparin Injectable 40 milliGRAM(s) SubCutaneous every 24 hours  glucagon  Injectable 1 milliGRAM(s) IntraMuscular once  influenza   Vaccine 0.5 milliLiter(s) IntraMuscular once  insulin lispro (ADMELOG) corrective regimen sliding scale   SubCutaneous three times a day before meals  insulin lispro (ADMELOG) corrective regimen sliding scale   SubCutaneous at bedtime  metFORMIN 500 milliGRAM(s) Oral two times a day  multivitamin 1 Tablet(s) Oral daily  pantoprazole    Tablet 40 milliGRAM(s) Oral before breakfast  polyethylene glycol 3350 17 Gram(s) Oral daily  senna 2 Tablet(s) Oral at bedtime    MEDICATIONS  (PRN):  aluminum hydroxide/magnesium hydroxide/simethicone Suspension 30 milliLiter(s) Oral every 4 hours PRN Dyspepsia  bisacodyl Suppository 10 milliGRAM(s) Rectal daily PRN Constipation  dextrose Oral Gel 15 Gram(s) Oral once PRN Blood Glucose LESS THAN 70 milliGRAM(s)/deciliter  melatonin 6 milliGRAM(s) Oral at bedtime PRN Insomnia  traMADol 50 milliGRAM(s) Oral every 4 hours PRN Severe Pain (7 - 10)  traMADol 25 milliGRAM(s) Oral every 6 hours PRN Moderate Pain (4 - 6)        Allergies    No Known Allergies    Intolerances              LABS:                           11.2   9.30  )-----------( 269      ( 01 Apr 2024 05:58 )             33.9     04-01    138  |  104  |  20  ----------------------------<  190<H>  4.4   |  25  |  0.88    Ca    8.8      01 Apr 2024 05:58    TPro  6.5  /  Alb  2.8<L>  /  TBili  0.5  /  DBili  x   /  AST  14  /  ALT  27  /  AlkPhos  92  04-01    LIVER FUNCTIONS - ( 01 Apr 2024 05:58 )  Alb: 2.8 g/dL / Pro: 6.5 g/dL / ALK PHOS: 92 U/L / ALT: 27 U/L / AST: 14 U/L / GGT: x             Urinalysis Basic - ( 01 Apr 2024 05:58 )    Color: x / Appearance: x / SG: x / pH: x  Gluc: 190 mg/dL / Ketone: x  / Bili: x / Urobili: x   Blood: x / Protein: x / Nitrite: x   Leuk Esterase: x / RBC: x / WBC x   Sq Epi: x / Non Sq Epi: x / Bacteria: x                CAPILLARY BLOOD GLUCOSE      POCT Blood Glucose.: 178 mg/dL (01 Apr 2024 11:39)  POCT Blood Glucose.: 161 mg/dL (01 Apr 2024 08:13)  POCT Blood Glucose.: 188 mg/dL (31 Mar 2024 22:06)  POCT Blood Glucose.: 204 mg/dL (31 Mar 2024 18:17)                RECENT CULTURES:          RADIOLOGY & ADDITIONAL TESTS:    Care Discussed with Consultants/Other Providers [Y/N]: rehab provider

## 2024-04-02 ENCOUNTER — TRANSCRIPTION ENCOUNTER (OUTPATIENT)
Age: 64
End: 2024-04-02

## 2024-04-02 LAB
GLUCOSE BLDC GLUCOMTR-MCNC: 167 MG/DL — HIGH (ref 70–99)
GLUCOSE BLDC GLUCOMTR-MCNC: 171 MG/DL — HIGH (ref 70–99)
GLUCOSE BLDC GLUCOMTR-MCNC: 192 MG/DL — HIGH (ref 70–99)
GLUCOSE BLDC GLUCOMTR-MCNC: 196 MG/DL — HIGH (ref 70–99)

## 2024-04-02 PROCEDURE — 99232 SBSQ HOSP IP/OBS MODERATE 35: CPT

## 2024-04-02 RX ORDER — ACETAMINOPHEN 500 MG
3 TABLET ORAL
Qty: 0 | Refills: 0 | DISCHARGE
Start: 2024-04-02

## 2024-04-02 RX ORDER — SENNA PLUS 8.6 MG/1
2 TABLET ORAL
Qty: 60 | Refills: 0
Start: 2024-04-02 | End: 2024-05-01

## 2024-04-02 RX ORDER — METFORMIN HYDROCHLORIDE 850 MG/1
1 TABLET ORAL
Qty: 60 | Refills: 0
Start: 2024-04-02 | End: 2024-05-01

## 2024-04-02 RX ORDER — AMLODIPINE BESYLATE 2.5 MG/1
1 TABLET ORAL
Qty: 30 | Refills: 0
Start: 2024-04-02 | End: 2024-05-01

## 2024-04-02 RX ORDER — LANOLIN ALCOHOL/MO/W.PET/CERES
2 CREAM (GRAM) TOPICAL
Qty: 60 | Refills: 0
Start: 2024-04-02 | End: 2024-05-01

## 2024-04-02 RX ORDER — ASCORBIC ACID 60 MG
1 TABLET,CHEWABLE ORAL
Qty: 30 | Refills: 0
Start: 2024-04-02 | End: 2024-05-01

## 2024-04-02 RX ORDER — PANTOPRAZOLE SODIUM 20 MG/1
1 TABLET, DELAYED RELEASE ORAL
Qty: 30 | Refills: 0
Start: 2024-04-02 | End: 2024-05-01

## 2024-04-02 RX ORDER — POLYETHYLENE GLYCOL 3350 17 G/17G
17 POWDER, FOR SOLUTION ORAL
Qty: 510 | Refills: 0
Start: 2024-04-02 | End: 2024-05-01

## 2024-04-02 RX ADMIN — Medication 975 MILLIGRAM(S): at 07:16

## 2024-04-02 RX ADMIN — Medication 975 MILLIGRAM(S): at 07:04

## 2024-04-02 RX ADMIN — PANTOPRAZOLE SODIUM 40 MILLIGRAM(S): 20 TABLET, DELAYED RELEASE ORAL at 07:17

## 2024-04-02 RX ADMIN — ENOXAPARIN SODIUM 40 MILLIGRAM(S): 100 INJECTION SUBCUTANEOUS at 12:38

## 2024-04-02 RX ADMIN — Medication 975 MILLIGRAM(S): at 13:07

## 2024-04-02 RX ADMIN — Medication 1 TABLET(S): at 12:39

## 2024-04-02 RX ADMIN — Medication 500 MILLIGRAM(S): at 12:39

## 2024-04-02 RX ADMIN — Medication 975 MILLIGRAM(S): at 12:38

## 2024-04-02 RX ADMIN — Medication 2: at 12:38

## 2024-04-02 RX ADMIN — METFORMIN HYDROCHLORIDE 500 MILLIGRAM(S): 850 TABLET ORAL at 17:01

## 2024-04-02 RX ADMIN — Medication 2: at 08:15

## 2024-04-02 RX ADMIN — Medication 10 MILLIGRAM(S): at 07:03

## 2024-04-02 RX ADMIN — Medication 975 MILLIGRAM(S): at 17:30

## 2024-04-02 RX ADMIN — METFORMIN HYDROCHLORIDE 500 MILLIGRAM(S): 850 TABLET ORAL at 07:44

## 2024-04-02 RX ADMIN — Medication 975 MILLIGRAM(S): at 00:31

## 2024-04-02 RX ADMIN — Medication 975 MILLIGRAM(S): at 22:51

## 2024-04-02 RX ADMIN — Medication 975 MILLIGRAM(S): at 17:00

## 2024-04-02 RX ADMIN — Medication 2: at 17:00

## 2024-04-02 RX ADMIN — Medication 6 MILLIGRAM(S): at 22:52

## 2024-04-02 RX ADMIN — Medication 975 MILLIGRAM(S): at 23:21

## 2024-04-02 NOTE — DISCHARGE NOTE PROVIDER - DETAILS OF MALNUTRITION DIAGNOSIS/DIAGNOSES
This patient has been assessed with a concern for Malnutrition and was treated during this hospitalization for the following Nutrition diagnosis/diagnoses:     -  03/26/2024: Moderate protein-calorie malnutrition
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
## 2024-04-02 NOTE — DISCHARGE NOTE PROVIDER - CARE PROVIDER_API CALL
Sebastián Carrion  Physical/Rehab Medicine  [address] [place] [address]  Phone: [phone]  Fax: [phone]  Follow Up Time: 1 month    Layo Hernandez  Orthopaedic Surgery  [address] [place] [address], [address]  [place] [address]  Phone: [phone]  Fax: [phone]  Follow Up Time: 1 week
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
## 2024-04-02 NOTE — DISCHARGE NOTE PROVIDER - NSDCMRMEDTOKEN_GEN_ALL_CORE_FT
acetaminophen 325 mg oral tablet: 3 tab(s) orally every 6 hours  aluminum hydroxide-magnesium hydroxide 200 mg-200 mg/5 mL oral suspension: 30 milliliter(s) orally every 4 hours as needed for Dyspepsia  amLODIPine 5 mg oral tablet: 1 tab(s) orally once a day (at bedtime)  ascorbic acid 500 mg oral tablet: 1 tab(s) orally once a day  bisacodyl 10 mg rectal suppository: 1 suppository(ies) rectal once a day as needed for Constipation  enalapril 10 mg oral tablet: 1 tab(s) orally once a day  melatonin 3 mg oral tablet: 2 tab(s) orally once a day (at bedtime) as needed for Insomnia  metFORMIN 500 mg oral tablet: 1 tab(s) orally 2 times a day  Multiple Vitamins oral tablet: 1 tab(s) orally once a day  polyethylene glycol 3350 oral powder for reconstitution: 17 gram(s) orally once a day  Protonix 40 mg oral delayed release tablet: 1 tab(s) orally once a day (before a meal)  senna leaf extract oral tablet: 2 tab(s) orally once a day (at bedtime)  traMADol 50 mg oral tablet: 0.5 tab(s) orally every 6 hours As needed Moderate Pain (4 - 6)  traMADol 50 mg oral tablet: 1 tab(s) orally every 4 hours  Vasotec 10 mg oral tablet: 1 tab(s) orally once a day  Xanax 1 mg oral tablet:    acetaminophen 325 mg oral tablet: 3 tab(s) orally every 6 hours  aluminum hydroxide-magnesium hydroxide 200 mg-200 mg/5 mL oral suspension: 30 milliliter(s) orally every 4 hours as needed for Dyspepsia  amLODIPine 5 mg oral tablet: 1 tab(s) orally once a day (at bedtime)  ascorbic acid 500 mg oral tablet: 1 tab(s) orally once a day  bisacodyl 10 mg rectal suppository: 1 suppository(ies) rectal once a day as needed for Constipation  Grab Bar: 18 inch grab bar  PINKY:99  Dx: Left intertrochateric fracture s/p ORIF  ICD-10: S72.142D, Z98.890  Medicaid#[id number]  NPI: [id number]  melatonin 3 mg oral tablet: 2 tab(s) orally once a day (at bedtime) as needed for Insomnia  metFORMIN 1000 mg oral tablet: 1 tab(s) orally 2 times a day  Multiple Vitamins oral tablet: 1 tab(s) orally once a day  polyethylene glycol 3350 oral powder for reconstitution: 17 gram(s) orally once a day  Protonix 40 mg oral delayed release tablet: 1 tab(s) orally once a day (before a meal)  senna leaf extract oral tablet: 2 tab(s) orally once a day (at bedtime)  traMADol 50 mg oral tablet: 1 tab(s) orally every 6 hours as needed for Severe Pain (7 - 10) MDD: 200mg  Vasotec 10 mg oral tablet: 1 tab(s) orally once a day  Vitamin D3 2000 intl units (50 mcg) oral tablet: 1 tab(s) orally once a day   acetaminophen 325 mg oral tablet: 3 tab(s) orally every 6 hours  aluminum hydroxide-magnesium hydroxide 200 mg-200 mg/5 mL oral suspension: 30 milliliter(s) orally every 4 hours as needed for Dyspepsia  amLODIPine 5 mg oral tablet: 1 tab(s) orally once a day (at bedtime)  ascorbic acid 500 mg oral tablet: 1 tab(s) orally once a day  Axillary Crutches: PINKY:99  Dx: Left intertrochateric fracture s/p ORIF  ICD-10: S72.142D, Z98.890  Medicaid#[id number]  NPI: [id number]  bisacodyl 10 mg rectal suppository: 1 suppository(ies) rectal once a day as needed for Constipation  lactulose 10 g/15 mL oral syrup: 15 milliliter(s) orally once a day as needed for  constipation  melatonin 3 mg oral tablet: 2 tab(s) orally once a day (at bedtime) as needed for Insomnia  metFORMIN 1000 mg oral tablet: 1 tab(s) orally 2 times a day  Multiple Vitamins oral tablet: 1 tab(s) orally once a day  polyethylene glycol 3350 oral powder for reconstitution: 17 gram(s) orally once a day  Protonix 40 mg oral delayed release tablet: 1 tab(s) orally once a day (before a meal)  senna leaf extract oral tablet: 2 tab(s) orally once a day (at bedtime)  traMADol 50 mg oral tablet: 1 tab(s) orally every 6 hours as needed for Severe Pain (7 - 10) MDD: 200mg  Vasotec 10 mg oral tablet: 1 tab(s) orally once a day  Vitamin D3 2000 intl units (50 mcg) oral tablet: 1 tab(s) orally once a day   acetaminophen 325 mg oral tablet: 3 tab(s) orally every 6 hours  aluminum hydroxide-magnesium hydroxide 200 mg-200 mg/5 mL oral suspension: 30 milliliter(s) orally every 4 hours as needed for Dyspepsia  amLODIPine 5 mg oral tablet: 1 tab(s) orally once a day (at bedtime)  ascorbic acid 500 mg oral tablet: 1 tab(s) orally once a day  bisacodyl 10 mg rectal suppository: 1 suppository(ies) rectal once a day as needed for Constipation  lactulose 10 g/15 mL oral syrup: 15 milliliter(s) orally once a day as needed for  constipation  melatonin 3 mg oral tablet: 2 tab(s) orally once a day (at bedtime) as needed for Insomnia  metFORMIN 1000 mg oral tablet: 1 tab(s) orally 2 times a day  Multiple Vitamins oral tablet: 1 tab(s) orally once a day  polyethylene glycol 3350 oral powder for reconstitution: 17 gram(s) orally once a day  Protonix 40 mg oral delayed release tablet: 1 tab(s) orally once a day (before a meal)  senna leaf extract oral tablet: 2 tab(s) orally once a day (at bedtime)  traMADol 50 mg oral tablet: 1 tab(s) orally every 6 hours as needed for Severe Pain (7 - 10) MDD: 200mg  Vasotec 10 mg oral tablet: 1 tab(s) orally once a day  Vitamin D3 2000 intl units (50 mcg) oral tablet: 1 tab(s) orally once a day   acetaminophen 325 mg oral tablet: 3 tab(s) orally every 6 hours  aluminum hydroxide-magnesium hydroxide 200 mg-200 mg/5 mL oral suspension: 30 milliliter(s) orally every 4 hours as needed for Dyspepsia  ascorbic acid 500 mg oral tablet: 1 tab(s) orally once a day  bisacodyl 10 mg rectal suppository: 1 suppository(ies) rectal once a day as needed for Constipation  lactulose 10 g/15 mL oral syrup: 15 milliliter(s) orally once a day as needed for  constipation  melatonin 3 mg oral tablet: 2 tab(s) orally once a day (at bedtime) as needed for Insomnia  metFORMIN 1000 mg oral tablet: 1 tab(s) orally 2 times a day  Multiple Vitamins oral tablet: 1 tab(s) orally once a day  polyethylene glycol 3350 oral powder for reconstitution: 17 gram(s) orally once a day  Protonix 40 mg oral delayed release tablet: 1 tab(s) orally once a day (before a meal)  senna leaf extract oral tablet: 2 tab(s) orally once a day (at bedtime)  traMADol 50 mg oral tablet: 1 tab(s) orally every 6 hours as needed for Severe Pain (7 - 10) MDD: 200mg  Vasotec 10 mg oral tablet: 1 tab(s) orally once a day  Vitamin D3 2000 intl units (50 mcg) oral tablet: 1 tab(s) orally once a day
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
## 2024-04-02 NOTE — DISCHARGE NOTE PROVIDER - CARE PROVIDERS DIRECT ADDRESSES
"Ambulatory Case Management Note    CCM Interim Update    Patient reports on call today she does not need Sonia to refill her Gabapentin as it comes from pain management she needs the Abilify to mail order, I explained Tressa did that yesterday for her. She also request appointment for next week to see Sonia for \"unable to pee right, but happening for a year now\" and I need a PAP smear. scheduled for next week. Reports her blood pressures are fine.   Called [facility] for vaccines.           Care Plan: Hypertension   Updates made since 11/24/2021 12:00 AM      Problem: BLOOD PRESSURE MONITORING       Goal: Establish Plan for Regular Lab Work Completed 11/24/2021   Start Date: 6/22/2021   Expected End Date: 9/22/2021   Recent Progress: On track      Task: Review the patients last urine protein. Discuss need if patient has not had one. Completed 11/24/2021   Responsible User: Sahara Zarate RN   Note:    Per PCP not needed 11/23/21         Sahara Zarate RN  Ambulatory Case Management    11/24/2021, 13:41 EST    "
[email or web address]

## 2024-04-02 NOTE — DISCHARGE NOTE PROVIDER - PROVIDER TOKENS
PROVIDER:[TOKEN:[[id number]],FOLLOWUP:[1 month]],PROVIDER:[TOKEN:[[id number]],FOLLOWUP:[1 week]]

## 2024-04-02 NOTE — DISCHARGE NOTE PROVIDER - NSDCCPCAREPLAN_GEN_ALL_CORE_FT
PRINCIPAL DISCHARGE DIAGNOSIS  Diagnosis: Intertrochanteric fracture of left hip  Assessment and Plan of Treatment: You underwent surgery on your left hip by orthopedic surgery on 3/22. Please follow up with Dr. Hernandez      SECONDARY DISCHARGE DIAGNOSES  Diagnosis: S/P BKA (below knee amputation), left  Assessment and Plan of Treatment: Please follow up with Dr. Edison Moura (in [place]) for prosthetics. Follow up with infectious disease regarding your chronic osteomyleitis.    Diagnosis: HTN (hypertension)  Assessment and Plan of Treatment: take your BP Medications as prescribed. Follow up with your PCP    Diagnosis: Type 2 diabetes mellitus  Assessment and Plan of Treatment: Please take metformin as prescribed and follow up with your PCP. Your A1c is 7.7 on 3/21, which means your diabetes is not under control. Monitor your diet.    Diagnosis: GERD (gastroesophageal reflux disease)  Assessment and Plan of Treatment: continue protonix daily.     PRINCIPAL DISCHARGE DIAGNOSIS  Diagnosis: Intertrochanteric fracture of left hip  Assessment and Plan of Treatment: You underwent surgery on your left hip by orthopedic surgery on 3/22. Please follow up with Dr. Hernandez      SECONDARY DISCHARGE DIAGNOSES  Diagnosis: S/P BKA (below knee amputation), left  Assessment and Plan of Treatment: Please follow up with Dr. Edison Moura (in [place]) for prosthetics. Follow up with infectious disease regarding your chronic osteomyleitis.    Diagnosis: HTN (hypertension)  Assessment and Plan of Treatment: take your BP Medications as prescribed. Follow up with your PCP in 1 week and get your blood pressure checked. do not rush out of bed or chair quickly. your blood pressure may drop and you may fall. sit on bed for few minutes before getting out. lay back if dizzy. seek immeidate medcial attendtion if dissy,chest pain, shortness or breath or any other concerning symptoms    Diagnosis: Type 2 diabetes mellitus  Assessment and Plan of Treatment: Please take metformin as prescribed and follow up with your PCP. Your A1c is 7.7 on 3/21, which means your diabetes is not under control. Monitor your diet.    Diagnosis: GERD (gastroesophageal reflux disease)  Assessment and Plan of Treatment: continue protonix daily.

## 2024-04-03 LAB
GLUCOSE BLDC GLUCOMTR-MCNC: 163 MG/DL — HIGH (ref 70–99)
GLUCOSE BLDC GLUCOMTR-MCNC: 173 MG/DL — HIGH (ref 70–99)
GLUCOSE BLDC GLUCOMTR-MCNC: 177 MG/DL — HIGH (ref 70–99)
GLUCOSE BLDC GLUCOMTR-MCNC: 193 MG/DL — HIGH (ref 70–99)

## 2024-04-03 PROCEDURE — 99232 SBSQ HOSP IP/OBS MODERATE 35: CPT

## 2024-04-03 RX ORDER — LANOLIN ALCOHOL/MO/W.PET/CERES
9 CREAM (GRAM) TOPICAL AT BEDTIME
Refills: 0 | Status: DISCONTINUED | OUTPATIENT
Start: 2024-04-03 | End: 2024-04-05

## 2024-04-03 RX ORDER — LANOLIN ALCOHOL/MO/W.PET/CERES
7 CREAM (GRAM) TOPICAL AT BEDTIME
Refills: 0 | Status: DISCONTINUED | OUTPATIENT
Start: 2024-04-03 | End: 2024-04-03

## 2024-04-03 RX ORDER — METFORMIN HYDROCHLORIDE 850 MG/1
1 TABLET ORAL
Qty: 60 | Refills: 0
Start: 2024-04-03 | End: 2024-05-02

## 2024-04-03 RX ORDER — METFORMIN HYDROCHLORIDE 850 MG/1
1000 TABLET ORAL
Refills: 0 | Status: DISCONTINUED | OUTPATIENT
Start: 2024-04-03 | End: 2024-04-05

## 2024-04-03 RX ORDER — ALPRAZOLAM 0.25 MG
0 TABLET ORAL
Qty: 0 | Refills: 0 | DISCHARGE

## 2024-04-03 RX ORDER — CHOLECALCIFEROL (VITAMIN D3) 125 MCG
1 CAPSULE ORAL
Qty: 30 | Refills: 0
Start: 2024-04-03 | End: 2024-05-02

## 2024-04-03 RX ORDER — TRAMADOL HYDROCHLORIDE 50 MG/1
1 TABLET ORAL
Qty: 28 | Refills: 0
Start: 2024-04-03 | End: 2024-04-09

## 2024-04-03 RX ADMIN — Medication 1 TABLET(S): at 12:12

## 2024-04-03 RX ADMIN — Medication 2: at 17:25

## 2024-04-03 RX ADMIN — Medication 975 MILLIGRAM(S): at 17:25

## 2024-04-03 RX ADMIN — Medication 975 MILLIGRAM(S): at 21:30

## 2024-04-03 RX ADMIN — Medication 975 MILLIGRAM(S): at 06:13

## 2024-04-03 RX ADMIN — Medication 2: at 07:58

## 2024-04-03 RX ADMIN — Medication 10 MILLIGRAM(S): at 05:24

## 2024-04-03 RX ADMIN — Medication 2: at 12:12

## 2024-04-03 RX ADMIN — Medication 9 MILLIGRAM(S): at 22:52

## 2024-04-03 RX ADMIN — POLYETHYLENE GLYCOL 3350 17 GRAM(S): 17 POWDER, FOR SOLUTION ORAL at 12:13

## 2024-04-03 RX ADMIN — Medication 500 MILLIGRAM(S): at 12:14

## 2024-04-03 RX ADMIN — METFORMIN HYDROCHLORIDE 1000 MILLIGRAM(S): 850 TABLET ORAL at 17:25

## 2024-04-03 RX ADMIN — METFORMIN HYDROCHLORIDE 500 MILLIGRAM(S): 850 TABLET ORAL at 05:25

## 2024-04-03 RX ADMIN — Medication 975 MILLIGRAM(S): at 05:24

## 2024-04-03 RX ADMIN — ENOXAPARIN SODIUM 40 MILLIGRAM(S): 100 INJECTION SUBCUTANEOUS at 12:12

## 2024-04-03 RX ADMIN — PANTOPRAZOLE SODIUM 40 MILLIGRAM(S): 20 TABLET, DELAYED RELEASE ORAL at 05:23

## 2024-04-03 RX ADMIN — AMLODIPINE BESYLATE 5 MILLIGRAM(S): 2.5 TABLET ORAL at 21:31

## 2024-04-03 RX ADMIN — Medication 975 MILLIGRAM(S): at 12:12

## 2024-04-03 RX ADMIN — Medication 975 MILLIGRAM(S): at 12:45

## 2024-04-03 RX ADMIN — Medication 975 MILLIGRAM(S): at 23:38
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
## 2024-04-03 NOTE — PROGRESS NOTE ADULT - SUBJECTIVE AND OBJECTIVE BOX
PROGRESS NOTE:     Patient is a 63y old  Male who presents with a chief complaint of Traumatic left intertrochanteric hip fracture s/p ORIF (29 Mar 2024 16:04)          SUBJECTIVE & OBJECTIVE:   Pt seen and examined at bedside in AM.  no overnight events.     REVIEW OF SYSTEMS: remaining ROS negative     PHYSICAL EXAM:  VITALS:  Vital Signs Last 24 Hrs  T(C): 36.7 (03 Apr 2024 07:55), Max: 36.7 (02 Apr 2024 20:48)  T(F): 98.1 (03 Apr 2024 07:55), Max: 98.1 (02 Apr 2024 20:48)  HR: 75 (03 Apr 2024 07:55) (61 - 85)  BP: 146/69 (03 Apr 2024 07:55) (104/61 - 148/77)  BP(mean): --  RR: 15 (03 Apr 2024 07:55) (15 - 16)  SpO2: 97% (03 Apr 2024 07:55) (97% - 98%)    Parameters below as of 03 Apr 2024 07:55  Patient On (Oxygen Delivery Method): room air          GENERAL: NAD,  no increased WOB  HEAD:  Atraumatic, Normocephalic  EYES: EOMI, conjunctiva and sclera clear  ENMT: Moist mucous membranes  NECK: Supple, No JVD  NERVOUS SYSTEM:  Alert & Oriented   CHEST/LUNG: Clear to auscultation bilaterally; No rales, rhonchi, wheezing  HEART: Regular rate and rhythm  ABDOMEN: Soft, Nontender, Nondistended; Bowel sounds present  EXTREMITIES:  Left BKA      MEDICATIONS  (STANDING):  acetaminophen     Tablet .. 975 milliGRAM(s) Oral every 6 hours  amLODIPine   Tablet 5 milliGRAM(s) Oral at bedtime  ascorbic acid 500 milliGRAM(s) Oral daily  dextrose 5%. 1000 milliLiter(s) (50 mL/Hr) IV Continuous <Continuous>  dextrose 5%. 1000 milliLiter(s) (100 mL/Hr) IV Continuous <Continuous>  dextrose 50% Injectable 25 Gram(s) IV Push once  dextrose 50% Injectable 12.5 Gram(s) IV Push once  dextrose 50% Injectable 25 Gram(s) IV Push once  enalapril 10 milliGRAM(s) Oral daily  enoxaparin Injectable 40 milliGRAM(s) SubCutaneous every 24 hours  glucagon  Injectable 1 milliGRAM(s) IntraMuscular once  influenza   Vaccine 0.5 milliLiter(s) IntraMuscular once  insulin lispro (ADMELOG) corrective regimen sliding scale   SubCutaneous three times a day before meals  insulin lispro (ADMELOG) corrective regimen sliding scale   SubCutaneous at bedtime  metFORMIN 500 milliGRAM(s) Oral two times a day  multivitamin 1 Tablet(s) Oral daily  pantoprazole    Tablet 40 milliGRAM(s) Oral before breakfast  polyethylene glycol 3350 17 Gram(s) Oral daily  senna 2 Tablet(s) Oral at bedtime    MEDICATIONS  (PRN):  aluminum hydroxide/magnesium hydroxide/simethicone Suspension 30 milliLiter(s) Oral every 4 hours PRN Dyspepsia  bisacodyl Suppository 10 milliGRAM(s) Rectal daily PRN Constipation  dextrose Oral Gel 15 Gram(s) Oral once PRN Blood Glucose LESS THAN 70 milliGRAM(s)/deciliter  melatonin 6 milliGRAM(s) Oral at bedtime PRN Insomnia  traMADol 50 milliGRAM(s) Oral every 4 hours PRN Severe Pain (7 - 10)  traMADol 25 milliGRAM(s) Oral every 6 hours PRN Moderate Pain (4 - 6)        Allergies    No Known Allergies    Intolerances              LABS:                           11.2   9.30  )-----------( 269      ( 01 Apr 2024 05:58 )             33.9     04-01    138  |  104  |  20  ----------------------------<  190<H>  4.4   |  25  |  0.88    Ca    8.8      01 Apr 2024 05:58    TPro  6.5  /  Alb  2.8<L>  /  TBili  0.5  /  DBili  x   /  AST  14  /  ALT  27  /  AlkPhos  92  04-01    LIVER FUNCTIONS - ( 01 Apr 2024 05:58 )  Alb: 2.8 g/dL / Pro: 6.5 g/dL / ALK PHOS: 92 U/L / ALT: 27 U/L / AST: 14 U/L / GGT: x             Urinalysis Basic - ( 01 Apr 2024 05:58 )    Color: x / Appearance: x / SG: x / pH: x  Gluc: 190 mg/dL / Ketone: x  / Bili: x / Urobili: x   Blood: x / Protein: x / Nitrite: x   Leuk Esterase: x / RBC: x / WBC x   Sq Epi: x / Non Sq Epi: x / Bacteria: x                CAPILLARY BLOOD GLUCOSE      POCT Blood Glucose.: 173 mg/dL (03 Apr 2024 07:54)  POCT Blood Glucose.: 167 mg/dL (02 Apr 2024 21:09)  POCT Blood Glucose.: 171 mg/dL (02 Apr 2024 16:57)  POCT Blood Glucose.: 196 mg/dL (02 Apr 2024 11:58)              RECENT CULTURES:          RADIOLOGY & ADDITIONAL TESTS:    Care Discussed with Consultants/Other Providers [Y/N]: rehab provider
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
## 2024-04-03 NOTE — CHART NOTE - NSCHARTNOTEFT_GEN_A_CORE
Assessment:   Patient seen for:  consult x PI stage 2 or >    Source: [x] medical review, [x] patient, [ ] family member    Factors impacting intake: [x] none [ ] nausea  [ ] vomiting [ ] diarrhea [ ] constipation  [ ]chewing problems [ ] swallowing issues  [ ] other:     Intake: %    Diet Prescription: Diet, Consistent Carbohydrate/No Snacks:   Supplement Feeding Modality:  Oral  Glucerna Shake Cans or Servings Per Day:  1       Frequency:  Daily (03-26-24 @ 09:48)      Current Weight: Weight (kg): 106.5 (03-28 @ 11:34)    Weight Change: no new wts recorded. Requested new weights.     Edema: none, as per nursing flowsheet   Skin: suspect DTI on L residual limb    Pt reports good appetite. PO intake %. Pt feeds self, reports no chewing/swallowing difficulties. NKFA. Denies N/V/C/D. Last BM: 3/30. UBW: 235 Lbs, current weight 234.7 Lbs. No edema. Skin suspect DTI on L residual limb. Dietary recall shows high carbohydrates intake. Pt mostly buy his foods, pastas, pizza, sandwiches. Noted A1C 7.7 (H) on 3/21). Explained verbally and in written form Therapeutic Diabetes Education. Mentioned benefits of Nitish and vitamins for wound healing. Pt was agreeable with recommendations.     Pertinent Medications: MEDICATIONS  (STANDING):  acetaminophen     Tablet .. 975 milliGRAM(s) Oral every 6 hours  dextrose 5%. 1000 milliLiter(s) (50 mL/Hr) IV Continuous <Continuous>  dextrose 5%. 1000 milliLiter(s) (100 mL/Hr) IV Continuous <Continuous>  dextrose 50% Injectable 25 Gram(s) IV Push once  dextrose 50% Injectable 25 Gram(s) IV Push once  dextrose 50% Injectable 12.5 Gram(s) IV Push once  enalapril 10 milliGRAM(s) Oral daily  enoxaparin Injectable 40 milliGRAM(s) SubCutaneous every 24 hours  glucagon  Injectable 1 milliGRAM(s) IntraMuscular once  influenza   Vaccine 0.5 milliLiter(s) IntraMuscular once  insulin lispro (ADMELOG) corrective regimen sliding scale   SubCutaneous three times a day before meals  insulin lispro (ADMELOG) corrective regimen sliding scale   SubCutaneous at bedtime  metFORMIN 500 milliGRAM(s) Oral two times a day  pantoprazole    Tablet 40 milliGRAM(s) Oral before breakfast  polyethylene glycol 3350 17 Gram(s) Oral daily  senna 2 Tablet(s) Oral at bedtime    MEDICATIONS  (PRN):  aluminum hydroxide/magnesium hydroxide/simethicone Suspension 30 milliLiter(s) Oral every 4 hours PRN Dyspepsia  bisacodyl Suppository 10 milliGRAM(s) Rectal daily PRN Constipation  dextrose Oral Gel 15 Gram(s) Oral once PRN Blood Glucose LESS THAN 70 milliGRAM(s)/deciliter  melatonin 6 milliGRAM(s) Oral at bedtime PRN Insomnia  traMADol 25 milliGRAM(s) Oral every 6 hours PRN Moderate Pain (4 - 6)  traMADol 50 milliGRAM(s) Oral every 4 hours PRN Severe Pain (7 - 10)    Pertinent Labs: 03-28 Na137 mmol/L Glu 170 mg/dL<H> K+ 4.4 mmol/L Cr  0.72 mg/dL BUN 12 mg/dL 03-28 Alb 2.7 g/dL<L> 03-21 Chol 154 mg/dL LDL --    HDL 42 mg/dL Trig 92 mg/dL     CAPILLARY BLOOD GLUCOSE      POCT Blood Glucose.: 194 mg/dL (30 Mar 2024 12:19)  POCT Blood Glucose.: 177 mg/dL (30 Mar 2024 08:05)  POCT Blood Glucose.: 197 mg/dL (29 Mar 2024 21:17)  POCT Blood Glucose.: 186 mg/dL (29 Mar 2024 17:20)    Estimated Needs:   [x] no change since previous assessment  [ ] recalculated:     Previous Nutrition Diagnosis:   [ ] Inadequate Energy Intake   [ ] Inadequate Oral Intake  [ ] Chewing/Swallowing Difficulties   [ ] Excessive Energy Intake   [ ] Underweight   [ ] Increased Nutrient Needs   [ ] Overweight/Obesity   [ ] Altered GI Function  [ ] Altered Nutrition Labs  [ ] Unintended Weight Loss   [ ] Food & Nutrition Related Knowledge Deficit   [x] Malnutrition Moderate    Nutrition Diagnosis is [x] ongoing  [ ] resolved [ ] not applicable     New Nutrition Diagnosis: [x] not applicable     Interventions:   Recommend  [ ] Change Diet To:  [x] Continue with current diet: Consistent Carbohydrates diet (no snacks)  [x] Nutrition Supplement: Glucerna shake 1 x day (per can 220 riya, 10 g protein), Nitish 1 pack BID (90 kcal, 7gm glutamine, 7gm arginine/pack)   [x] Suggest adding MVI, Vit C 500mg, zinc sulfate 220mg x 10 days  [x] Honor pt's food preferences as feasible with prescribed diet.  [x] Obtain and record PO intake and weights daily    Monitoring and Evaluation:   Continue to monitor Nutritional intake, Tolerance to diet prescription, weights, labs, skin integrity.  RD remains available upon request and will follow up per protocol.
Nutrition Follow Up Note  Hospital Course   (Per Electronic Medical Record)    Source:  Patient [X]  Medical Record [X]      Diet:   Consistent Carbohydrate Diet w/ Thin Liquids (IDDSI Level 0)  Tolerates Diet Consistency Well  No Chewing/Swallowing Difficulties  No Recent Nausea, Vomiting, Diarrhea or Constipation (as Per Patient)  Consumes % of Meals (as Per Documentation) - States Good PO Intake/Appetite (Per Patient)   on Glucerna 8oz PO Daily (Provides 220kcal-10grams of Protein)  Nutrition Education Provided on Consistent Carbohydrate Diet    Enteral/Parenteral Nutrition: Not Applicable    Current Weight: 234lb on 3/27  Obtain New Weight  Obtain Weights Weekly     Pertinent Medications: MEDICATIONS  (STANDING):  acetaminophen     Tablet .. 975 milliGRAM(s) Oral every 6 hours  dextrose 5%. 1000 milliLiter(s) (50 mL/Hr) IV Continuous <Continuous>  dextrose 5%. 1000 milliLiter(s) (100 mL/Hr) IV Continuous <Continuous>  dextrose 50% Injectable 25 Gram(s) IV Push once  dextrose 50% Injectable 12.5 Gram(s) IV Push once  dextrose 50% Injectable 25 Gram(s) IV Push once  enalapril 10 milliGRAM(s) Oral daily  enoxaparin Injectable 40 milliGRAM(s) SubCutaneous every 24 hours  glucagon  Injectable 1 milliGRAM(s) IntraMuscular once  influenza   Vaccine 0.5 milliLiter(s) IntraMuscular once  insulin lispro (ADMELOG) corrective regimen sliding scale   SubCutaneous at bedtime  insulin lispro (ADMELOG) corrective regimen sliding scale   SubCutaneous three times a day before meals  pantoprazole    Tablet 40 milliGRAM(s) Oral before breakfast  polyethylene glycol 3350 17 Gram(s) Oral daily  senna 2 Tablet(s) Oral at bedtime    MEDICATIONS  (PRN):  aluminum hydroxide/magnesium hydroxide/simethicone Suspension 30 milliLiter(s) Oral every 4 hours PRN Dyspepsia  dextrose Oral Gel 15 Gram(s) Oral once PRN Blood Glucose LESS THAN 70 milliGRAM(s)/deciliter  melatonin 6 milliGRAM(s) Oral at bedtime PRN Insomnia  traMADol 50 milliGRAM(s) Oral every 4 hours PRN Severe Pain (7 - 10)  traMADol 25 milliGRAM(s) Oral every 6 hours PRN Moderate Pain (4 - 6)    Pertinent Labs:  03-28 Na137 mmol/L Glu 170 mg/dL<H> K+ 4.4 mmol/L Cr  0.72 mg/dL BUN 12 mg/dL 03-23 Phos 2.8 mg/dL 03-28 Alb 2.7 g/dL<L> 03-21 Chol 154 mg/dL LDL --    HDL 42 mg/dL Trig 92 mg/dL    POCT (over Last 3 Days) - Ranging from 142-238    Skin: No Pressure Ulcers  Surgical Incision on Thigh  (as Per Nursing Flow Sheet)     Edema: None Noted (as Per Documentation)     Last Bowel Movement: on 3/27    Estimated Needs:   [X] No Change Since Previous Assessment    Previous Nutrition Diagnosis:   Moderate Malnutrition     Nutrition Diagnosis is [X] Ongoing - Continues on Nutrition Supplement     New Nutrition Diagnosis: [X] Not Applicable    Interventions:   1. Nutrition Education Provided on Consistent Carbohydrate Diet  2. Recommend Continue Nutrition Plan of Care     Monitoring & Evaluation:   [X] Weights   [X] PO Intake   [X] Skin Integrity   [X] Follow Up (Per Protocol)  [X] Tolerance to Diet Prescription   [X] Other: Labs & POCT    Registered Dietitian/Nutritionist Remains Available.  Johnathan Kuo, EDN, CDN    Phone# [phone]
Nutrition Follow Up Note  Hospital Course   (Per Electronic Medical Record)    Source:  Patient [X]  Medical Record [X]      Diet:   Consistent Carbohydrate Diet w/ Thin Liquids (IDDSI Level 0)  Tolerates Diet Consistency Well  No Chewing/Swallowing Difficulties  No Recent Nausea, Vomiting, Diarrhea or Constipation (as Per Patient)  Consumes % of Meals (as Per Documentation) - States Good PO Intake/Appetite (Per Patient)  on Glucerna 8oz PO Daily (Provides 220kcal-10grams of Protein)  on Nitish 1 Packet Daily (Provides 90kcal & 14grams of Protein)  Patient Takes Nutrition Supplements Well  Obtained Food Preferences from Patient    Enteral/Parenteral Nutrition: Not Applicable    Current Weight: 234.7lb on 4/2  Obtain New Weight  Obtain Weights Weekly     Pertinent Medications: MEDICATIONS  (STANDING):  acetaminophen     Tablet .. 975 milliGRAM(s) Oral every 6 hours  amLODIPine   Tablet 5 milliGRAM(s) Oral at bedtime  ascorbic acid 500 milliGRAM(s) Oral daily  dextrose 5%. 1000 milliLiter(s) (50 mL/Hr) IV Continuous <Continuous>  dextrose 5%. 1000 milliLiter(s) (100 mL/Hr) IV Continuous <Continuous>  dextrose 50% Injectable 25 Gram(s) IV Push once  dextrose 50% Injectable 12.5 Gram(s) IV Push once  dextrose 50% Injectable 25 Gram(s) IV Push once  enalapril 10 milliGRAM(s) Oral daily  enoxaparin Injectable 40 milliGRAM(s) SubCutaneous every 24 hours  glucagon  Injectable 1 milliGRAM(s) IntraMuscular once  influenza   Vaccine 0.5 milliLiter(s) IntraMuscular once  insulin lispro (ADMELOG) corrective regimen sliding scale   SubCutaneous three times a day before meals  insulin lispro (ADMELOG) corrective regimen sliding scale   SubCutaneous at bedtime  metFORMIN 500 milliGRAM(s) Oral two times a day  multivitamin 1 Tablet(s) Oral daily  pantoprazole    Tablet 40 milliGRAM(s) Oral before breakfast  polyethylene glycol 3350 17 Gram(s) Oral daily  senna 2 Tablet(s) Oral at bedtime    MEDICATIONS  (PRN):  aluminum hydroxide/magnesium hydroxide/simethicone Suspension 30 milliLiter(s) Oral every 4 hours PRN Dyspepsia  bisacodyl Suppository 10 milliGRAM(s) Rectal daily PRN Constipation  dextrose Oral Gel 15 Gram(s) Oral once PRN Blood Glucose LESS THAN 70 milliGRAM(s)/deciliter  melatonin 6 milliGRAM(s) Oral at bedtime PRN Insomnia  traMADol 50 milliGRAM(s) Oral every 4 hours PRN Severe Pain (7 - 10)  traMADol 25 milliGRAM(s) Oral every 6 hours PRN Moderate Pain (4 - 6)    Pertinent Labs:  04-01 Na138 mmol/L Glu 190 mg/dL<H> K+ 4.4 mmol/L Cr  0.88 mg/dL BUN 20 mg/dL 04-01 Alb 2.8 g/dL<L> 03-21 Chol 154 mg/dL LDL --    HDL 42 mg/dL Trig 92 mg/dL    POCT (over Last 3 Days) - Ranging from 167-220    Skin: DTI to Residual Limb  Multiple Surgical Incisions  (as Per Nursing Flow Sheet)     Edema: None Noted (as Per Documentation)     Last Bowel Movement: on 3/31    Estimated Needs:   [X] No Change Since Previous Assessment    Previous Nutrition Diagnosis:   Moderate Malnutrition     Nutrition Diagnosis is [X] Ongoing - Continues on Nutrition Supplement & Patient Takes Nutrition Supplement    New Nutrition Diagnosis: [X] Not Applicable    Interventions:   1. Recommend Continue Nutrition Plan of Care     Monitoring & Evaluation:   [X] Weights   [X] PO Intake   [X] Skin Integrity   [X] Follow Up (Per Protocol)  [X] Tolerance to Diet Prescription   [X] Other: Labs     Registered Dietitian/Nutritionist Remains Available.  Johnathan Kuo RDN, CDN    Phone# [phone]

## 2024-04-04 ENCOUNTER — TRANSCRIPTION ENCOUNTER (OUTPATIENT)
Age: 64
End: 2024-04-04

## 2024-04-04 LAB
ALBUMIN SERPL ELPH-MCNC: 3 G/DL — LOW (ref 3.3–5)
ALP SERPL-CCNC: 121 U/L — HIGH (ref 40–120)
ALT FLD-CCNC: 23 U/L — SIGNIFICANT CHANGE UP (ref 10–45)
ANION GAP SERPL CALC-SCNC: 10 MMOL/L — SIGNIFICANT CHANGE UP (ref 5–17)
AST SERPL-CCNC: 14 U/L — SIGNIFICANT CHANGE UP (ref 10–40)
BASOPHILS # BLD AUTO: 0.08 K/UL — SIGNIFICANT CHANGE UP (ref 0–0.2)
BASOPHILS NFR BLD AUTO: 0.9 % — SIGNIFICANT CHANGE UP (ref 0–2)
BILIRUB SERPL-MCNC: 0.7 MG/DL — SIGNIFICANT CHANGE UP (ref 0.2–1.2)
BUN SERPL-MCNC: 24 MG/DL — HIGH (ref 7–23)
CALCIUM SERPL-MCNC: 9.1 MG/DL — SIGNIFICANT CHANGE UP (ref 8.4–10.5)
CHLORIDE SERPL-SCNC: 103 MMOL/L — SIGNIFICANT CHANGE UP (ref 96–108)
CO2 SERPL-SCNC: 27 MMOL/L — SIGNIFICANT CHANGE UP (ref 22–31)
CREAT SERPL-MCNC: 0.87 MG/DL — SIGNIFICANT CHANGE UP (ref 0.5–1.3)
EGFR: 96 ML/MIN/1.73M2 — SIGNIFICANT CHANGE UP
EOSINOPHIL # BLD AUTO: 0.28 K/UL — SIGNIFICANT CHANGE UP (ref 0–0.5)
EOSINOPHIL NFR BLD AUTO: 3.1 % — SIGNIFICANT CHANGE UP (ref 0–6)
GLUCOSE BLDC GLUCOMTR-MCNC: 157 MG/DL — HIGH (ref 70–99)
GLUCOSE BLDC GLUCOMTR-MCNC: 160 MG/DL — HIGH (ref 70–99)
GLUCOSE BLDC GLUCOMTR-MCNC: 168 MG/DL — HIGH (ref 70–99)
GLUCOSE BLDC GLUCOMTR-MCNC: 187 MG/DL — HIGH (ref 70–99)
GLUCOSE SERPL-MCNC: 168 MG/DL — HIGH (ref 70–99)
HCT VFR BLD CALC: 36 % — LOW (ref 39–50)
HGB BLD-MCNC: 12.1 G/DL — LOW (ref 13–17)
IMM GRANULOCYTES NFR BLD AUTO: 0.6 % — SIGNIFICANT CHANGE UP (ref 0–0.9)
LYMPHOCYTES # BLD AUTO: 2.37 K/UL — SIGNIFICANT CHANGE UP (ref 1–3.3)
LYMPHOCYTES # BLD AUTO: 26.2 % — SIGNIFICANT CHANGE UP (ref 13–44)
MCHC RBC-ENTMCNC: 30.2 PG — SIGNIFICANT CHANGE UP (ref 27–34)
MCHC RBC-ENTMCNC: 33.6 GM/DL — SIGNIFICANT CHANGE UP (ref 32–36)
MCV RBC AUTO: 89.8 FL — SIGNIFICANT CHANGE UP (ref 80–100)
MONOCYTES # BLD AUTO: 0.79 K/UL — SIGNIFICANT CHANGE UP (ref 0–0.9)
MONOCYTES NFR BLD AUTO: 8.7 % — SIGNIFICANT CHANGE UP (ref 2–14)
NEUTROPHILS # BLD AUTO: 5.47 K/UL — SIGNIFICANT CHANGE UP (ref 1.8–7.4)
NEUTROPHILS NFR BLD AUTO: 60.5 % — SIGNIFICANT CHANGE UP (ref 43–77)
NRBC # BLD: 0 /100 WBCS — SIGNIFICANT CHANGE UP (ref 0–0)
PLATELET # BLD AUTO: 362 K/UL — SIGNIFICANT CHANGE UP (ref 150–400)
POTASSIUM SERPL-MCNC: 4.9 MMOL/L — SIGNIFICANT CHANGE UP (ref 3.5–5.3)
POTASSIUM SERPL-SCNC: 4.9 MMOL/L — SIGNIFICANT CHANGE UP (ref 3.5–5.3)
PROT SERPL-MCNC: 6.9 G/DL — SIGNIFICANT CHANGE UP (ref 6–8.3)
RBC # BLD: 4.01 M/UL — LOW (ref 4.2–5.8)
RBC # FLD: 13.9 % — SIGNIFICANT CHANGE UP (ref 10.3–14.5)
SODIUM SERPL-SCNC: 140 MMOL/L — SIGNIFICANT CHANGE UP (ref 135–145)
WBC # BLD: 9.04 K/UL — SIGNIFICANT CHANGE UP (ref 3.8–10.5)
WBC # FLD AUTO: 9.04 K/UL — SIGNIFICANT CHANGE UP (ref 3.8–10.5)

## 2024-04-04 PROCEDURE — 99232 SBSQ HOSP IP/OBS MODERATE 35: CPT

## 2024-04-04 RX ORDER — LACTULOSE 10 G/15ML
15 SOLUTION ORAL
Qty: 450 | Refills: 0
Start: 2024-04-04 | End: 2024-05-03

## 2024-04-04 RX ORDER — LACTULOSE 10 G/15ML
10 SOLUTION ORAL ONCE
Refills: 0 | Status: COMPLETED | OUTPATIENT
Start: 2024-04-04 | End: 2024-04-04

## 2024-04-04 RX ADMIN — METFORMIN HYDROCHLORIDE 1000 MILLIGRAM(S): 850 TABLET ORAL at 05:16

## 2024-04-04 RX ADMIN — SENNA PLUS 2 TABLET(S): 8.6 TABLET ORAL at 21:18

## 2024-04-04 RX ADMIN — Medication 975 MILLIGRAM(S): at 17:28

## 2024-04-04 RX ADMIN — Medication 975 MILLIGRAM(S): at 18:03

## 2024-04-04 RX ADMIN — POLYETHYLENE GLYCOL 3350 17 GRAM(S): 17 POWDER, FOR SOLUTION ORAL at 11:58

## 2024-04-04 RX ADMIN — Medication 975 MILLIGRAM(S): at 05:15

## 2024-04-04 RX ADMIN — METFORMIN HYDROCHLORIDE 1000 MILLIGRAM(S): 850 TABLET ORAL at 17:28

## 2024-04-04 RX ADMIN — Medication 9 MILLIGRAM(S): at 21:18

## 2024-04-04 RX ADMIN — Medication 2: at 11:58

## 2024-04-04 RX ADMIN — Medication 500 MILLIGRAM(S): at 11:58

## 2024-04-04 RX ADMIN — Medication 975 MILLIGRAM(S): at 12:30

## 2024-04-04 RX ADMIN — Medication 2: at 17:28

## 2024-04-04 RX ADMIN — LACTULOSE 10 GRAM(S): 10 SOLUTION ORAL at 11:57

## 2024-04-04 RX ADMIN — AMLODIPINE BESYLATE 5 MILLIGRAM(S): 2.5 TABLET ORAL at 21:18

## 2024-04-04 RX ADMIN — PANTOPRAZOLE SODIUM 40 MILLIGRAM(S): 20 TABLET, DELAYED RELEASE ORAL at 05:16

## 2024-04-04 RX ADMIN — Medication 2: at 07:58

## 2024-04-04 RX ADMIN — Medication 1 TABLET(S): at 11:58

## 2024-04-04 RX ADMIN — Medication 10 MILLIGRAM(S): at 05:16

## 2024-04-04 RX ADMIN — Medication 975 MILLIGRAM(S): at 11:58

## 2024-04-04 RX ADMIN — ENOXAPARIN SODIUM 40 MILLIGRAM(S): 100 INJECTION SUBCUTANEOUS at 11:58

## 2024-04-04 NOTE — DISCHARGE NOTE NURSING/CASE MANAGEMENT/SOCIAL WORK - NSDCPEEMAIL_GEN_ALL_CORE
[facility] for Tobacco Control email [email or web address]
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
## 2024-04-04 NOTE — DISCHARGE NOTE NURSING/CASE MANAGEMENT/SOCIAL WORK - PATIENT PORTAL LINK FT
You can access the FollowMyHealth Patient Portal offered by [facility] by registering at the following website: [email or web address]. By joining [employer]’s FollowMyHealth portal, you will also be able to view your health information using other applications (apps) compatible with our system.

## 2024-04-04 NOTE — DISCHARGE NOTE NURSING/CASE MANAGEMENT/SOCIAL WORK - NSDCPEWEB_GEN_ALL_CORE
[facility] for Tobacco Control website --- [email or web address] website --- [email or web address]
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
## 2024-04-05 VITALS
OXYGEN SATURATION: 97 % | HEART RATE: 85 BPM | DIASTOLIC BLOOD PRESSURE: 76 MMHG | RESPIRATION RATE: 18 BRPM | SYSTOLIC BLOOD PRESSURE: 144 MMHG | TEMPERATURE: 98 F

## 2024-04-05 LAB
GLUCOSE BLDC GLUCOMTR-MCNC: 146 MG/DL — HIGH (ref 70–99)
GLUCOSE BLDC GLUCOMTR-MCNC: 179 MG/DL — HIGH (ref 70–99)

## 2024-04-05 PROCEDURE — 99232 SBSQ HOSP IP/OBS MODERATE 35: CPT

## 2024-04-05 RX ORDER — TRAMADOL HYDROCHLORIDE 50 MG/1
25 TABLET ORAL ONCE
Refills: 0 | Status: DISCONTINUED | OUTPATIENT
Start: 2024-04-05 | End: 2024-04-05

## 2024-04-05 RX ORDER — AMLODIPINE BESYLATE 2.5 MG/1
5 TABLET ORAL AT BEDTIME
Refills: 0 | Status: DISCONTINUED | OUTPATIENT
Start: 2024-04-05 | End: 2024-04-05

## 2024-04-05 RX ADMIN — ENOXAPARIN SODIUM 40 MILLIGRAM(S): 100 INJECTION SUBCUTANEOUS at 12:21

## 2024-04-05 RX ADMIN — POLYETHYLENE GLYCOL 3350 17 GRAM(S): 17 POWDER, FOR SOLUTION ORAL at 12:23

## 2024-04-05 RX ADMIN — PANTOPRAZOLE SODIUM 40 MILLIGRAM(S): 20 TABLET, DELAYED RELEASE ORAL at 06:15

## 2024-04-05 RX ADMIN — Medication 975 MILLIGRAM(S): at 12:21

## 2024-04-05 RX ADMIN — Medication 975 MILLIGRAM(S): at 06:17

## 2024-04-05 RX ADMIN — Medication 975 MILLIGRAM(S): at 05:35

## 2024-04-05 RX ADMIN — Medication 2: at 12:21

## 2024-04-05 RX ADMIN — TRAMADOL HYDROCHLORIDE 25 MILLIGRAM(S): 50 TABLET ORAL at 06:15

## 2024-04-05 RX ADMIN — METFORMIN HYDROCHLORIDE 1000 MILLIGRAM(S): 850 TABLET ORAL at 05:35

## 2024-04-05 RX ADMIN — Medication 500 MILLIGRAM(S): at 12:21

## 2024-04-05 RX ADMIN — Medication 10 MILLIGRAM(S): at 05:35

## 2024-04-05 RX ADMIN — Medication 1 TABLET(S): at 12:21

## 2024-04-05 NOTE — PROGRESS NOTE ADULT - ATTENDING COMMENTS
I independently performed the documented the history, exam, and medical decision making. I have made amendments to the documentation where necessary. I have personally seen and examined the patient. Medical records were reviewed and I have made amendments to the documentation where necessary and adjusted the history, physical examination, and plan as documented by the Resident Physician.
I independently performed the documented the history, exam, and medical decision making. I have made amendments to the documentation where necessary. I have personally seen and examined the patient. Medical records were reviewed and I have made amendments to the documentation where necessary and adjusted the history, physical examination, and plan as documented by the Resident Physician.
Seen and examined, Note revised, findings as noted by Rehab Resident    No acute med complaint     Tolerating diet   Left hip surgical area--healed  Left residual limb, BKA site with hyperpigmentation   Left knee ROM--Flexion > 90 deg    Clinically stable   Appropriately responsive   Postural hypotension noted    DC home today  Amlodipine d/sheila and discussed with patient   F/u with ortho and his PCP
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
## 2024-04-05 NOTE — PROGRESS NOTE ADULT - SUBJECTIVE AND OBJECTIVE BOX
Medicine Progress Note    Patient is a 63y old  Male who presents with a chief complaint of Traumatic left intertrochanteric hip fracture s/p ORIF (04 Apr 2024 10:52)      SUBJECTIVE / OVERNIGHT EVENTS:  seen and examined  Chart reviewed  No overnight events  Limb weakness improving with therapy  BM+  pain controlled with meds  No complaints    ADDITIONAL REVIEW OF SYSTEMS:  denied fever/chills/CP/SOB/cough/palpitation/dizziness/abdominal pian/nausea/vomiting/diarrhoea/constipation/dysuria/leg or calf pain/headaches.all other ROS neg    MEDICATIONS  (STANDING):  acetaminophen     Tablet .. 975 milliGRAM(s) Oral every 6 hours  amLODIPine   Tablet 5 milliGRAM(s) Oral at bedtime  ascorbic acid 500 milliGRAM(s) Oral daily  dextrose 5%. 1000 milliLiter(s) (50 mL/Hr) IV Continuous <Continuous>  dextrose 5%. 1000 milliLiter(s) (100 mL/Hr) IV Continuous <Continuous>  dextrose 50% Injectable 25 Gram(s) IV Push once  dextrose 50% Injectable 12.5 Gram(s) IV Push once  dextrose 50% Injectable 25 Gram(s) IV Push once  enalapril 10 milliGRAM(s) Oral daily  enoxaparin Injectable 40 milliGRAM(s) SubCutaneous every 24 hours  glucagon  Injectable 1 milliGRAM(s) IntraMuscular once  influenza   Vaccine 0.5 milliLiter(s) IntraMuscular once  insulin lispro (ADMELOG) corrective regimen sliding scale   SubCutaneous three times a day before meals  insulin lispro (ADMELOG) corrective regimen sliding scale   SubCutaneous at bedtime  melatonin 9 milliGRAM(s) Oral at bedtime  metFORMIN 1000 milliGRAM(s) Oral two times a day  multivitamin 1 Tablet(s) Oral daily  pantoprazole    Tablet 40 milliGRAM(s) Oral before breakfast  polyethylene glycol 3350 17 Gram(s) Oral daily  senna 2 Tablet(s) Oral at bedtime    MEDICATIONS  (PRN):  aluminum hydroxide/magnesium hydroxide/simethicone Suspension 30 milliLiter(s) Oral every 4 hours PRN Dyspepsia  bisacodyl Suppository 10 milliGRAM(s) Rectal daily PRN Constipation  dextrose Oral Gel 15 Gram(s) Oral once PRN Blood Glucose LESS THAN 70 milliGRAM(s)/deciliter    CAPILLARY BLOOD GLUCOSE      POCT Blood Glucose.: 146 mg/dL (05 Apr 2024 08:11)  POCT Blood Glucose.: 157 mg/dL (04 Apr 2024 21:22)  POCT Blood Glucose.: 168 mg/dL (04 Apr 2024 17:26)  POCT Blood Glucose.: 187 mg/dL (04 Apr 2024 11:56)    I&O's Summary      PHYSICAL EXAM:  Vital Signs Last 24 Hrs  T(C): 36.6 (05 Apr 2024 08:53), Max: 37.2 (04 Apr 2024 20:59)  T(F): 97.9 (05 Apr 2024 08:53), Max: 99 (04 Apr 2024 20:59)  HR: 75 (05 Apr 2024 08:53) (74 - 75)  BP: 103/70 (05 Apr 2024 08:53) (103/70 - 109/53)  BP(mean): --  RR: 17 (05 Apr 2024 08:53) (16 - 17)  SpO2: 97% (05 Apr 2024 08:53) (97% - 98%)    Parameters below as of 05 Apr 2024 08:53  Patient On (Oxygen Delivery Method): room air    GENERAL: Not in distress. Alert    HEENT: clear conjuctiva, MMM. no pallor or icterus  CARDIOVASCULAR: RRR S1, S2. No murmur/rubs/gallop  LUNGS: BLAE+, no rales, no wheezing, no rhonchi.    ABDOMEN: ND. Soft,  NT, no guarding / rebound / rigidity. BS normoactive  BACK: No spine tenderness.  EXTREMITIES: no edema. no leg or calf TP. left BKA. left hip surgical wound  SKIN: warm and dry.   PSYCHIATRIC: Calm.  No agitation.  CNS: AAO. moves limbs, follows commands    LABS:                        12.1   9.04  )-----------( 362      ( 04 Apr 2024 06:44 )             36.0     04-04    140  |  103  |  24<H>  ----------------------------<  168<H>  4.9   |  27  |  0.87    Ca    9.1      04 Apr 2024 06:44    TPro  6.9  /  Alb  3.0<L>  /  TBili  0.7  /  DBili  x   /  AST  14  /  ALT  23  /  AlkPhos  121<H>  04-04          Urinalysis Basic - ( 04 Apr 2024 06:44 )    Color: x / Appearance: x / SG: x / pH: x  Gluc: 168 mg/dL / Ketone: x  / Bili: x / Urobili: x   Blood: x / Protein: x / Nitrite: x   Leuk Esterase: x / RBC: x / WBC x   Sq Epi: x / Non Sq Epi: x / Bacteria: x            RADIOLOGY & ADDITIONAL TESTS:  Imaging from Last 24 Hours:    Electrocardiogram/QTc Interval:    COORDINATION OF CARE:  Care Discussed with Consultants/Other Providers:

## 2024-04-05 NOTE — PROGRESS NOTE ADULT - NUTRITIONAL ASSESSMENT
This patient has been assessed with a concern for Malnutrition and has been determined to have a diagnosis/diagnoses of Moderate protein-calorie malnutrition.    This patient is being managed with:   Diet Consistent Carbohydrate/No Snacks-  Nitish(7 Gm Arginine/7 Gm Glut/1.2 Gm HMB     Qty per Day:  1  Supplement Feeding Modality:  Oral  Glucerna Shake Cans or Servings Per Day:  1       Frequency:  Daily  Entered: Mar 30 2024  4:24PM  
This patient has been assessed with a concern for Malnutrition and has been determined to have a diagnosis/diagnoses of Moderate protein-calorie malnutrition.    This patient is being managed with:   Diet Consistent Carbohydrate/No Snacks-  Supplement Feeding Modality:  Oral  Glucerna Shake Cans or Servings Per Day:  1       Frequency:  Daily  Entered: Mar 26 2024  9:47AM  
This patient has been assessed with a concern for Malnutrition and has been determined to have a diagnosis/diagnoses of Moderate protein-calorie malnutrition.    This patient is being managed with:   Diet Consistent Carbohydrate/No Snacks-  Nitish(7 Gm Arginine/7 Gm Glut/1.2 Gm HMB     Qty per Day:  1  Supplement Feeding Modality:  Oral  Glucerna Shake Cans or Servings Per Day:  1       Frequency:  Daily  Entered: Mar 30 2024  4:24PM  
This patient has been assessed with a concern for Malnutrition and has been determined to have a diagnosis/diagnoses of Moderate protein-calorie malnutrition.    This patient is being managed with:   Diet Consistent Carbohydrate/No Snacks-  Nitish(7 Gm Arginine/7 Gm Glut/1.2 Gm HMB     Qty per Day:  1  Supplement Feeding Modality:  Oral  Glucerna Shake Cans or Servings Per Day:  1       Frequency:  Daily  Entered: Mar 30 2024  4:24PM  
This patient has been assessed with a concern for Malnutrition and has been determined to have a diagnosis/diagnoses of Moderate protein-calorie malnutrition.    This patient is being managed with:   Diet Consistent Carbohydrate/No Snacks-  Supplement Feeding Modality:  Oral  Glucerna Shake Cans or Servings Per Day:  1       Frequency:  Daily  Entered: Mar 26 2024  9:47AM  
This patient has been assessed with a concern for Malnutrition and has been determined to have a diagnosis/diagnoses of Moderate protein-calorie malnutrition.    This patient is being managed with:   Diet Consistent Carbohydrate/No Snacks-  Nitish(7 Gm Arginine/7 Gm Glut/1.2 Gm HMB     Qty per Day:  1  Supplement Feeding Modality:  Oral  Glucerna Shake Cans or Servings Per Day:  1       Frequency:  Daily  Entered: Mar 30 2024  4:24PM
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
## 2024-04-05 NOTE — PROGRESS NOTE ADULT - REASON FOR ADMISSION
Traumatic left intertrochanteric hip fracture s/p ORIF

## 2024-04-08 RX ORDER — GLUCOSAMINE HCL/CHONDROITIN SU 500-400 MG
3 CAPSULE ORAL AT BEDTIME
Refills: 0 | Status: ACTIVE | COMMUNITY
Start: 2024-04-08

## 2024-04-08 RX ORDER — ENALAPRIL MALEATE 10 MG/1
10 TABLET ORAL DAILY
Qty: 90 | Refills: 3 | Status: COMPLETED | COMMUNITY
Start: 2023-03-22 | End: 2024-04-08

## 2024-04-08 RX ORDER — CHOLECALCIFEROL (VITAMIN D3) 50 MCG
50 MCG TABLET ORAL AS DIRECTED
Refills: 0 | Status: ACTIVE | COMMUNITY
Start: 2024-04-08

## 2024-04-08 RX ORDER — ACETAMINOPHEN 325 MG/1
325 TABLET ORAL EVERY 6 HOURS
Refills: 0 | Status: ACTIVE | COMMUNITY
Start: 2024-04-08

## 2024-04-08 RX ORDER — ENALAPRIL MALEATE 10 MG/1
10 TABLET ORAL DAILY
Refills: 0 | Status: ACTIVE | COMMUNITY
Start: 2024-04-08

## 2024-04-08 RX ORDER — TRAMADOL HYDROCHLORIDE 50 MG/1
50 TABLET, COATED ORAL
Refills: 0 | Status: ACTIVE | COMMUNITY
Start: 2024-04-08

## 2024-04-08 RX ORDER — MULTIVIT-MIN/FOLIC/VIT K/LYCOP 400-300MCG
500 TABLET ORAL DAILY
Refills: 0 | Status: ACTIVE | COMMUNITY
Start: 2024-04-08

## 2024-04-08 RX ORDER — BISACODYL 10 MG/1
10 SUPPOSITORY RECTAL
Refills: 0 | Status: ACTIVE | COMMUNITY
Start: 2024-04-08

## 2024-04-08 RX ORDER — LACTULOSE 10 G/15ML
10 SOLUTION ORAL DAILY
Refills: 0 | Status: ACTIVE | COMMUNITY
Start: 2024-04-08

## 2024-04-08 RX ORDER — POLYETHYLENE GLYCOL 3350 17 G/17G
17 POWDER, FOR SOLUTION ORAL DAILY
Refills: 0 | Status: ACTIVE | COMMUNITY
Start: 2024-04-08

## 2024-04-11 ENCOUNTER — TRANSCRIPTION ENCOUNTER (OUTPATIENT)
Age: 64
End: 2024-04-11

## 2024-04-15 ENCOUNTER — APPOINTMENT (OUTPATIENT)
Dept: FAMILY MEDICINE | Facility: CLINIC | Age: 64
End: 2024-04-15
Payer: MEDICARE

## 2024-04-15 VITALS — HEART RATE: 76 BPM | RESPIRATION RATE: 20 BRPM | SYSTOLIC BLOOD PRESSURE: 122 MMHG | DIASTOLIC BLOOD PRESSURE: 68 MMHG

## 2024-04-15 PROCEDURE — 36415 COLL VENOUS BLD VENIPUNCTURE: CPT

## 2024-04-15 PROCEDURE — 99214 OFFICE O/P EST MOD 30 MIN: CPT

## 2024-04-15 NOTE — HISTORY OF PRESENT ILLNESS
[de-identified] : Presents for BP check, labs, and general follow-up.  Note S/P repair of L hip fracture; continues in PT at home; scheduled for Ortho F/U tomorrow.  Has stopped smoking; not self-checking but trying to maintain a healthy diet.

## 2024-04-16 ENCOUNTER — OUTPATIENT (OUTPATIENT)
Dept: OUTPATIENT SERVICES | Facility: HOSPITAL | Age: 64
LOS: 1 days | End: 2024-04-16
Payer: MEDICARE

## 2024-04-16 ENCOUNTER — APPOINTMENT (OUTPATIENT)
Dept: RADIOLOGY | Facility: HOSPITAL | Age: 64
End: 2024-04-16

## 2024-04-16 DIAGNOSIS — Z98.41 CATARACT EXTRACTION STATUS, RIGHT EYE: Chronic | ICD-10-CM

## 2024-04-16 DIAGNOSIS — Z89.512 ACQUIRED ABSENCE OF LEFT LEG BELOW KNEE: Chronic | ICD-10-CM

## 2024-04-16 DIAGNOSIS — Z00.8 ENCOUNTER FOR OTHER GENERAL EXAMINATION: ICD-10-CM

## 2024-04-16 LAB
ALBUMIN SERPL ELPH-MCNC: 4.3 G/DL
ALP BLD-CCNC: 215 U/L
ALT SERPL-CCNC: 15 U/L
ANION GAP SERPL CALC-SCNC: 14 MMOL/L
AST SERPL-CCNC: 13 U/L
BILIRUB SERPL-MCNC: 0.4 MG/DL
BUN SERPL-MCNC: 22 MG/DL
CALCIUM SERPL-MCNC: 9.9 MG/DL
CHLORIDE SERPL-SCNC: 103 MMOL/L
CHOLEST SERPL-MCNC: 162 MG/DL
CO2 SERPL-SCNC: 22 MMOL/L
CREAT SERPL-MCNC: 0.88 MG/DL
EGFR: 96 ML/MIN/1.73M2
ESTIMATED AVERAGE GLUCOSE: 163 MG/DL
GLUCOSE SERPL-MCNC: 194 MG/DL
HBA1C MFR BLD HPLC: 7.3 %
HCT VFR BLD CALC: 39.6 %
HDLC SERPL-MCNC: 46 MG/DL
HGB BLD-MCNC: 12.8 G/DL
LDLC SERPL CALC-MCNC: 87 MG/DL
MCHC RBC-ENTMCNC: 30.5 PG
MCHC RBC-ENTMCNC: 32.3 GM/DL
MCV RBC AUTO: 94.3 FL
NONHDLC SERPL-MCNC: 116 MG/DL
PLATELET # BLD AUTO: 289 K/UL
POTASSIUM SERPL-SCNC: 4.7 MMOL/L
PROT SERPL-MCNC: 7 G/DL
RBC # BLD: 4.2 M/UL
RBC # FLD: 14.3 %
SODIUM SERPL-SCNC: 140 MMOL/L
TRIGL SERPL-MCNC: 170 MG/DL
WBC # FLD AUTO: 9.49 K/UL

## 2024-04-16 PROCEDURE — 73502 X-RAY EXAM HIP UNI 2-3 VIEWS: CPT

## 2024-04-16 PROCEDURE — 73502 X-RAY EXAM HIP UNI 2-3 VIEWS: CPT | Mod: 26,LT

## 2024-05-10 PROCEDURE — 97530 THERAPEUTIC ACTIVITIES: CPT | Mod: GO

## 2024-05-10 PROCEDURE — 97116 GAIT TRAINING THERAPY: CPT | Mod: GP

## 2024-05-10 PROCEDURE — 80053 COMPREHEN METABOLIC PANEL: CPT

## 2024-05-10 PROCEDURE — 85025 COMPLETE CBC W/AUTO DIFF WBC: CPT

## 2024-05-10 PROCEDURE — 97112 NEUROMUSCULAR REEDUCATION: CPT | Mod: GP

## 2024-05-10 PROCEDURE — 97161 PT EVAL LOW COMPLEX 20 MIN: CPT | Mod: GP

## 2024-05-10 PROCEDURE — 97542 WHEELCHAIR MNGMENT TRAINING: CPT | Mod: GP

## 2024-05-10 PROCEDURE — 97165 OT EVAL LOW COMPLEX 30 MIN: CPT | Mod: GO

## 2024-05-10 PROCEDURE — 36415 COLL VENOUS BLD VENIPUNCTURE: CPT

## 2024-05-10 PROCEDURE — 97535 SELF CARE MNGMENT TRAINING: CPT | Mod: GO

## 2024-05-10 PROCEDURE — 82962 GLUCOSE BLOOD TEST: CPT

## 2024-05-10 PROCEDURE — 97110 THERAPEUTIC EXERCISES: CPT | Mod: GO

## 2024-05-29 ENCOUNTER — APPOINTMENT (OUTPATIENT)
Dept: FAMILY MEDICINE | Facility: CLINIC | Age: 64
End: 2024-05-29
Payer: MEDICARE

## 2024-05-29 VITALS
WEIGHT: 237 LBS | BODY MASS INDEX: 32.1 KG/M2 | SYSTOLIC BLOOD PRESSURE: 125 MMHG | HEART RATE: 76 BPM | RESPIRATION RATE: 20 BRPM | DIASTOLIC BLOOD PRESSURE: 70 MMHG | HEIGHT: 72 IN

## 2024-05-29 DIAGNOSIS — E11.9 TYPE 2 DIABETES MELLITUS W/OUT COMPLICATIONS: ICD-10-CM

## 2024-05-29 DIAGNOSIS — I10 ESSENTIAL (PRIMARY) HYPERTENSION: ICD-10-CM

## 2024-05-29 DIAGNOSIS — E78.5 HYPERLIPIDEMIA, UNSPECIFIED: ICD-10-CM

## 2024-05-29 PROCEDURE — 99214 OFFICE O/P EST MOD 30 MIN: CPT

## 2024-05-29 PROCEDURE — G2211 COMPLEX E/M VISIT ADD ON: CPT

## 2024-05-29 PROCEDURE — 36415 COLL VENOUS BLD VENIPUNCTURE: CPT
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
## 2024-05-30 LAB
ALBUMIN SERPL ELPH-MCNC: 4.3 G/DL
ALP BLD-CCNC: 176 U/L
ALT SERPL-CCNC: 15 U/L
ANION GAP SERPL CALC-SCNC: 13 MMOL/L
AST SERPL-CCNC: 14 U/L
BILIRUB SERPL-MCNC: 0.4 MG/DL
BUN SERPL-MCNC: 18 MG/DL
CALCIUM SERPL-MCNC: 9.6 MG/DL
CHLORIDE SERPL-SCNC: 101 MMOL/L
CHOLEST SERPL-MCNC: 192 MG/DL
CO2 SERPL-SCNC: 23 MMOL/L
CREAT SERPL-MCNC: 0.84 MG/DL
EGFR: 97 ML/MIN/1.73M2
ESTIMATED AVERAGE GLUCOSE: 169 MG/DL
GLUCOSE SERPL-MCNC: 200 MG/DL
HBA1C MFR BLD HPLC: 7.5 %
HCT VFR BLD CALC: 42.3 %
HDLC SERPL-MCNC: 42 MG/DL
HGB BLD-MCNC: 13.7 G/DL
LDLC SERPL CALC-MCNC: 112 MG/DL
MCHC RBC-ENTMCNC: 30.6 PG
MCHC RBC-ENTMCNC: 32.4 GM/DL
MCV RBC AUTO: 94.4 FL
NONHDLC SERPL-MCNC: 150 MG/DL
PLATELET # BLD AUTO: 233 K/UL
POTASSIUM SERPL-SCNC: 4.1 MMOL/L
PROT SERPL-MCNC: 7.1 G/DL
RBC # BLD: 4.48 M/UL
RBC # FLD: 13.6 %
SODIUM SERPL-SCNC: 137 MMOL/L
TRIGL SERPL-MCNC: 218 MG/DL
WBC # FLD AUTO: 9.36 K/UL

## 2024-06-18 ENCOUNTER — RX RENEWAL (OUTPATIENT)
Age: 64
End: 2024-06-18

## 2024-06-18 RX ORDER — PANTOPRAZOLE 40 MG/1
40 TABLET, DELAYED RELEASE ORAL
Qty: 90 | Refills: 0 | Status: ACTIVE | COMMUNITY
Start: 2023-03-22 | End: 1900-01-01

## 2024-06-18 RX ORDER — METFORMIN HYDROCHLORIDE 1000 MG/1
1000 TABLET, COATED ORAL
Qty: 180 | Refills: 0 | Status: ACTIVE | COMMUNITY
Start: 2023-03-22 | End: 1900-01-01

## 2024-07-15 ENCOUNTER — APPOINTMENT (OUTPATIENT)
Dept: FAMILY MEDICINE | Facility: CLINIC | Age: 64
End: 2024-07-15
Payer: MEDICARE

## 2024-07-15 VITALS
RESPIRATION RATE: 20 BRPM | HEART RATE: 76 BPM | DIASTOLIC BLOOD PRESSURE: 70 MMHG | BODY MASS INDEX: 30.75 KG/M2 | SYSTOLIC BLOOD PRESSURE: 124 MMHG | HEIGHT: 72 IN | WEIGHT: 227 LBS

## 2024-07-15 DIAGNOSIS — M86.9 OSTEOMYELITIS, UNSPECIFIED: ICD-10-CM

## 2024-07-15 DIAGNOSIS — E78.5 HYPERLIPIDEMIA, UNSPECIFIED: ICD-10-CM

## 2024-07-15 DIAGNOSIS — E11.9 TYPE 2 DIABETES MELLITUS W/OUT COMPLICATIONS: ICD-10-CM

## 2024-07-15 DIAGNOSIS — I10 ESSENTIAL (PRIMARY) HYPERTENSION: ICD-10-CM

## 2024-07-15 PROCEDURE — 36415 COLL VENOUS BLD VENIPUNCTURE: CPT

## 2024-07-15 PROCEDURE — G2211 COMPLEX E/M VISIT ADD ON: CPT

## 2024-07-15 PROCEDURE — 99214 OFFICE O/P EST MOD 30 MIN: CPT

## 2024-07-16 LAB
ALBUMIN SERPL ELPH-MCNC: 4.5 G/DL
ALP BLD-CCNC: 158 U/L
ALT SERPL-CCNC: 14 U/L
ANION GAP SERPL CALC-SCNC: 13 MMOL/L
AST SERPL-CCNC: 15 U/L
BILIRUB SERPL-MCNC: 0.7 MG/DL
BUN SERPL-MCNC: 21 MG/DL
CALCIUM SERPL-MCNC: 10 MG/DL
CHLORIDE SERPL-SCNC: 103 MMOL/L
CHOLEST SERPL-MCNC: 212 MG/DL
CO2 SERPL-SCNC: 23 MMOL/L
CREAT SERPL-MCNC: 0.98 MG/DL
EGFR: 86 ML/MIN/1.73M2
ESTIMATED AVERAGE GLUCOSE: 217 MG/DL
GLUCOSE SERPL-MCNC: 200 MG/DL
HBA1C MFR BLD HPLC: 9.2 %
HCT VFR BLD CALC: 44.4 %
HDLC SERPL-MCNC: 47 MG/DL
HGB BLD-MCNC: 14.5 G/DL
LDLC SERPL CALC-MCNC: 137 MG/DL
MCHC RBC-ENTMCNC: 30.1 PG
MCHC RBC-ENTMCNC: 32.7 GM/DL
MCV RBC AUTO: 92.3 FL
NONHDLC SERPL-MCNC: 165 MG/DL
PLATELET # BLD AUTO: 260 K/UL
POTASSIUM SERPL-SCNC: 4.6 MMOL/L
PROT SERPL-MCNC: 7.5 G/DL
RBC # BLD: 4.81 M/UL
RBC # FLD: 13.3 %
SODIUM SERPL-SCNC: 139 MMOL/L
TRIGL SERPL-MCNC: 152 MG/DL
WBC # FLD AUTO: 7.93 K/UL

## 2024-08-29 ENCOUNTER — APPOINTMENT (OUTPATIENT)
Dept: FAMILY MEDICINE | Facility: CLINIC | Age: 64
End: 2024-08-29
Payer: MEDICARE

## 2024-08-29 VITALS
HEIGHT: 72 IN | RESPIRATION RATE: 20 BRPM | DIASTOLIC BLOOD PRESSURE: 70 MMHG | BODY MASS INDEX: 30.75 KG/M2 | SYSTOLIC BLOOD PRESSURE: 135 MMHG | WEIGHT: 227 LBS | HEART RATE: 68 BPM

## 2024-08-29 DIAGNOSIS — I10 ESSENTIAL (PRIMARY) HYPERTENSION: ICD-10-CM

## 2024-08-29 DIAGNOSIS — E78.5 HYPERLIPIDEMIA, UNSPECIFIED: ICD-10-CM

## 2024-08-29 DIAGNOSIS — E11.9 TYPE 2 DIABETES MELLITUS W/OUT COMPLICATIONS: ICD-10-CM

## 2024-08-29 PROCEDURE — G2211 COMPLEX E/M VISIT ADD ON: CPT

## 2024-08-29 PROCEDURE — 99214 OFFICE O/P EST MOD 30 MIN: CPT

## 2024-08-29 PROCEDURE — 36415 COLL VENOUS BLD VENIPUNCTURE: CPT

## 2024-08-29 NOTE — HISTORY OF PRESENT ILLNESS
[de-identified] : Presents for BP check, labs, and general follow-up.  Trying to be more compliant with medication (but ? dietary compliance).

## 2024-08-30 LAB
ALBUMIN SERPL ELPH-MCNC: 4.5 G/DL
ALP BLD-CCNC: 141 U/L
ALT SERPL-CCNC: 15 U/L
ANION GAP SERPL CALC-SCNC: 13 MMOL/L
AST SERPL-CCNC: 17 U/L
BILIRUB SERPL-MCNC: 0.4 MG/DL
BUN SERPL-MCNC: 30 MG/DL
CALCIUM SERPL-MCNC: 9.7 MG/DL
CHLORIDE SERPL-SCNC: 103 MMOL/L
CHOLEST SERPL-MCNC: 185 MG/DL
CO2 SERPL-SCNC: 22 MMOL/L
CREAT SERPL-MCNC: 1.25 MG/DL
EGFR: 64 ML/MIN/1.73M2
ESTIMATED AVERAGE GLUCOSE: 212 MG/DL
GLUCOSE SERPL-MCNC: 188 MG/DL
HBA1C MFR BLD HPLC: 9 %
HCT VFR BLD CALC: 40.6 %
HDLC SERPL-MCNC: 39 MG/DL
HGB BLD-MCNC: 13.2 G/DL
LDLC SERPL CALC-MCNC: 115 MG/DL
MCHC RBC-ENTMCNC: 29.2 PG
MCHC RBC-ENTMCNC: 32.5 GM/DL
MCV RBC AUTO: 89.8 FL
NONHDLC SERPL-MCNC: 146 MG/DL
PLATELET # BLD AUTO: 250 K/UL
POTASSIUM SERPL-SCNC: 5.3 MMOL/L
PROT SERPL-MCNC: 7.5 G/DL
RBC # BLD: 4.52 M/UL
RBC # FLD: 13.6 %
SODIUM SERPL-SCNC: 139 MMOL/L
TRIGL SERPL-MCNC: 173 MG/DL
WBC # FLD AUTO: 9.1 K/UL

## 2024-10-14 ENCOUNTER — RX RENEWAL (OUTPATIENT)
Age: 64
End: 2024-10-14

## 2024-12-11 ENCOUNTER — APPOINTMENT (OUTPATIENT)
Dept: FAMILY MEDICINE | Facility: CLINIC | Age: 64
End: 2024-12-11
Payer: MEDICARE

## 2024-12-11 VITALS — HEART RATE: 68 BPM | RESPIRATION RATE: 20 BRPM | SYSTOLIC BLOOD PRESSURE: 124 MMHG | DIASTOLIC BLOOD PRESSURE: 70 MMHG

## 2024-12-11 DIAGNOSIS — E78.5 HYPERLIPIDEMIA, UNSPECIFIED: ICD-10-CM

## 2024-12-11 DIAGNOSIS — I10 ESSENTIAL (PRIMARY) HYPERTENSION: ICD-10-CM

## 2024-12-11 DIAGNOSIS — E11.9 TYPE 2 DIABETES MELLITUS W/OUT COMPLICATIONS: ICD-10-CM

## 2024-12-11 PROCEDURE — 36415 COLL VENOUS BLD VENIPUNCTURE: CPT

## 2024-12-11 PROCEDURE — 99214 OFFICE O/P EST MOD 30 MIN: CPT

## 2024-12-11 PROCEDURE — G2211 COMPLEX E/M VISIT ADD ON: CPT

## 2024-12-12 LAB
ALBUMIN SERPL ELPH-MCNC: 4.1 G/DL
ALP BLD-CCNC: 120 U/L
ALT SERPL-CCNC: 9 U/L
ANION GAP SERPL CALC-SCNC: 13 MMOL/L
AST SERPL-CCNC: 13 U/L
BILIRUB SERPL-MCNC: 0.6 MG/DL
BUN SERPL-MCNC: 13 MG/DL
CALCIUM SERPL-MCNC: 9.8 MG/DL
CHLORIDE SERPL-SCNC: 103 MMOL/L
CHOLEST SERPL-MCNC: 166 MG/DL
CO2 SERPL-SCNC: 25 MMOL/L
CREAT SERPL-MCNC: 0.82 MG/DL
EGFR: 97 ML/MIN/1.73M2
ESTIMATED AVERAGE GLUCOSE: 203 MG/DL
FOLATE SERPL-MCNC: 6.5 NG/ML
GLUCOSE SERPL-MCNC: 164 MG/DL
HBA1C MFR BLD HPLC: 8.7 %
HCT VFR BLD CALC: 42.1 %
HDLC SERPL-MCNC: 44 MG/DL
HGB BLD-MCNC: 13.7 G/DL
LDLC SERPL CALC-MCNC: 97 MG/DL
MCHC RBC-ENTMCNC: 29.3 PG
MCHC RBC-ENTMCNC: 32.5 G/DL
MCV RBC AUTO: 90.1 FL
NONHDLC SERPL-MCNC: 121 MG/DL
PLATELET # BLD AUTO: 220 K/UL
POTASSIUM SERPL-SCNC: 4.5 MMOL/L
PROT SERPL-MCNC: 7 G/DL
PSA SERPL-MCNC: 0.61 NG/ML
RBC # BLD: 4.67 M/UL
RBC # FLD: 13.2 %
SODIUM SERPL-SCNC: 141 MMOL/L
T4 FREE SERPL-MCNC: 1.3 NG/DL
TRIGL SERPL-MCNC: 136 MG/DL
TSH SERPL-ACNC: 2.07 UIU/ML
VIT B12 SERPL-MCNC: 427 PG/ML
WBC # FLD AUTO: 7.6 K/UL

## 2025-03-14 ENCOUNTER — APPOINTMENT (OUTPATIENT)
Dept: FAMILY MEDICINE | Facility: CLINIC | Age: 65
End: 2025-03-14
Payer: MEDICARE

## 2025-03-14 VITALS — DIASTOLIC BLOOD PRESSURE: 70 MMHG | SYSTOLIC BLOOD PRESSURE: 122 MMHG | RESPIRATION RATE: 20 BRPM | HEART RATE: 76 BPM

## 2025-03-14 DIAGNOSIS — E78.5 HYPERLIPIDEMIA, UNSPECIFIED: ICD-10-CM

## 2025-03-14 DIAGNOSIS — I10 ESSENTIAL (PRIMARY) HYPERTENSION: ICD-10-CM

## 2025-03-14 DIAGNOSIS — E11.9 TYPE 2 DIABETES MELLITUS W/OUT COMPLICATIONS: ICD-10-CM

## 2025-03-14 PROCEDURE — G2211 COMPLEX E/M VISIT ADD ON: CPT

## 2025-03-14 PROCEDURE — 36415 COLL VENOUS BLD VENIPUNCTURE: CPT

## 2025-03-14 PROCEDURE — 99214 OFFICE O/P EST MOD 30 MIN: CPT

## 2025-03-15 LAB
ALBUMIN SERPL ELPH-MCNC: 4.6 G/DL
ALP BLD-CCNC: 113 U/L
ALT SERPL-CCNC: 19 U/L
ANION GAP SERPL CALC-SCNC: 13 MMOL/L
AST SERPL-CCNC: 15 U/L
BILIRUB SERPL-MCNC: 0.9 MG/DL
BUN SERPL-MCNC: 13 MG/DL
CALCIUM SERPL-MCNC: 9.7 MG/DL
CHLORIDE SERPL-SCNC: 104 MMOL/L
CHOLEST SERPL-MCNC: 181 MG/DL
CO2 SERPL-SCNC: 24 MMOL/L
CREAT SERPL-MCNC: 0.89 MG/DL
EGFRCR SERPLBLD CKD-EPI 2021: 95 ML/MIN/1.73M2
ESTIMATED AVERAGE GLUCOSE: 200 MG/DL
GLUCOSE SERPL-MCNC: 173 MG/DL
HBA1C MFR BLD HPLC: 8.6 %
HCT VFR BLD CALC: 44.1 %
HDLC SERPL-MCNC: 48 MG/DL
HGB BLD-MCNC: 14.2 G/DL
LDLC SERPL CALC-MCNC: 113 MG/DL
MCHC RBC-ENTMCNC: 28.9 PG
MCHC RBC-ENTMCNC: 32.2 G/DL
MCV RBC AUTO: 89.8 FL
NONHDLC SERPL-MCNC: 132 MG/DL
PLATELET # BLD AUTO: 254 K/UL
POTASSIUM SERPL-SCNC: 4.3 MMOL/L
PROT SERPL-MCNC: 7.5 G/DL
RBC # BLD: 4.91 M/UL
RBC # FLD: 13.9 %
SODIUM SERPL-SCNC: 141 MMOL/L
TRIGL SERPL-MCNC: 110 MG/DL
WBC # FLD AUTO: 8.54 K/UL

## 2025-06-17 ENCOUNTER — OUTPATIENT (OUTPATIENT)
Dept: OUTPATIENT SERVICES | Facility: HOSPITAL | Age: 65
LOS: 1 days | End: 2025-06-17
Payer: MEDICARE

## 2025-06-17 ENCOUNTER — APPOINTMENT (OUTPATIENT)
Dept: RADIOLOGY | Facility: HOSPITAL | Age: 65
End: 2025-06-17

## 2025-06-17 DIAGNOSIS — Z00.8 ENCOUNTER FOR OTHER GENERAL EXAMINATION: ICD-10-CM

## 2025-06-17 PROCEDURE — 73502 X-RAY EXAM HIP UNI 2-3 VIEWS: CPT | Mod: 26,LT

## 2025-06-17 PROCEDURE — 73502 X-RAY EXAM HIP UNI 2-3 VIEWS: CPT

## 2025-06-20 ENCOUNTER — APPOINTMENT (OUTPATIENT)
Dept: FAMILY MEDICINE | Facility: CLINIC | Age: 65
End: 2025-06-20
Payer: MEDICARE

## 2025-06-20 VITALS — SYSTOLIC BLOOD PRESSURE: 122 MMHG | HEART RATE: 76 BPM | DIASTOLIC BLOOD PRESSURE: 70 MMHG | RESPIRATION RATE: 20 BRPM

## 2025-06-20 PROCEDURE — 99214 OFFICE O/P EST MOD 30 MIN: CPT

## 2025-06-20 PROCEDURE — G2211 COMPLEX E/M VISIT ADD ON: CPT

## 2025-06-20 PROCEDURE — 36415 COLL VENOUS BLD VENIPUNCTURE: CPT

## 2025-06-21 LAB
ALBUMIN SERPL ELPH-MCNC: 4.3 G/DL
ALP BLD-CCNC: 113 U/L
ALT SERPL-CCNC: 14 U/L
ANION GAP SERPL CALC-SCNC: 16 MMOL/L
AST SERPL-CCNC: 18 U/L
BILIRUB SERPL-MCNC: 0.6 MG/DL
BUN SERPL-MCNC: 22 MG/DL
CALCIUM SERPL-MCNC: 10 MG/DL
CHLORIDE SERPL-SCNC: 103 MMOL/L
CHOLEST SERPL-MCNC: 187 MG/DL
CO2 SERPL-SCNC: 22 MMOL/L
CREAT SERPL-MCNC: 1 MG/DL
EGFRCR SERPLBLD CKD-EPI 2021: 84 ML/MIN/1.73M2
ESTIMATED AVERAGE GLUCOSE: 206 MG/DL
GLUCOSE SERPL-MCNC: 154 MG/DL
HBA1C MFR BLD HPLC: 8.8 %
HCT VFR BLD CALC: 44.1 %
HDLC SERPL-MCNC: 48 MG/DL
HGB BLD-MCNC: 14.3 G/DL
LDLC SERPL-MCNC: 113 MG/DL
MCHC RBC-ENTMCNC: 28.7 PG
MCHC RBC-ENTMCNC: 32.4 G/DL
MCV RBC AUTO: 88.4 FL
NONHDLC SERPL-MCNC: 139 MG/DL
PLATELET # BLD AUTO: 204 K/UL
POTASSIUM SERPL-SCNC: 4.6 MMOL/L
PROT SERPL-MCNC: 7.6 G/DL
RBC # BLD: 4.99 M/UL
RBC # FLD: 14.8 %
SODIUM SERPL-SCNC: 141 MMOL/L
TRIGL SERPL-MCNC: 146 MG/DL
WBC # FLD AUTO: 9.33 K/UL

## 2025-06-24 ENCOUNTER — NON-APPOINTMENT (OUTPATIENT)
Age: 65
End: 2025-06-24

## 2025-09-15 ENCOUNTER — NON-APPOINTMENT (OUTPATIENT)
Age: 65
End: 2025-09-15

## (undated) DEVICE — FORCEP RADIAL JAW 4 W NDL 2.4MM 2.8MM 240CM ORANGE DISP

## (undated) DEVICE — SNARE EXACTO COLD 9MMX230CM

## (undated) DEVICE — SOL IRR POUR NS 0.9% 500ML

## (undated) DEVICE — SOL IRR POUR H2O 1000ML

## (undated) DEVICE — POLY TRAP ETRAP

## (undated) DEVICE — SOLIDIFIER CANN EXPRESS 3K

## (undated) DEVICE — PREP CHLORAPREP HI-LITE ORANGE 26ML

## (undated) DEVICE — CONTAINER FORMALIN BUFF 10% 60ML

## (undated) DEVICE — WARMING BLANKET UPPER ADULT

## (undated) DEVICE — TUBING CAP SET ERBEFLO CLEVERCAP HYBRID CO2 FOR OLYMPUS SCOPES AND UCR

## (undated) DEVICE — SUT POLYSORB 0 30" GS-21 UNDYED

## (undated) DEVICE — KIT ENDO PROCEDURE CUST W/VLV

## (undated) DEVICE — PACK MINOR

## (undated) DEVICE — CANISTER SUCTION LID GUARD 3000CC

## (undated) DEVICE — DRAPE SHOWER CURTAIN ISOLATION

## (undated) DEVICE — SOL IRR POUR H2O 1500ML

## (undated) DEVICE — FORCEP RADIAL JAW 4 240CM DISP

## (undated) DEVICE — GUIDEWIRE SYNTHES 3.2MM X 400MM

## (undated) DEVICE — VENODYNE/SCD SLEEVE CALF LARGE

## (undated) DEVICE — PLATE NESSY 170

## (undated) DEVICE — ENDOCUFF VISION SZ 2 LG GRN

## (undated) DEVICE — SUT POLYSORB 2-0 30" GS-21 UNDYED

## (undated) DEVICE — SNARE POLYP SENS SM 13MM 240CM

## (undated) DEVICE — SUT POLYSORB 1 36" GS-21 UNDYED

## (undated) DEVICE — POSITIONER FOAM HEAD CRADLE (PINK)

## (undated) DEVICE — POSITIONER STRAP ARMBOARD VELCRO TS-30

## (undated) DEVICE — STAPLER SKIN VISI-STAT 35 WIDE

## (undated) DEVICE — DRILL BIT SYNTHES ORTHO CALIBRATED 4.2MM X 330MM